# Patient Record
Sex: FEMALE | Race: WHITE | NOT HISPANIC OR LATINO | Employment: OTHER | URBAN - NONMETROPOLITAN AREA
[De-identification: names, ages, dates, MRNs, and addresses within clinical notes are randomized per-mention and may not be internally consistent; named-entity substitution may affect disease eponyms.]

---

## 2017-01-13 ENCOUNTER — OFFICE VISIT (OUTPATIENT)
Dept: CARDIOLOGY | Facility: CLINIC | Age: 77
End: 2017-01-13

## 2017-01-13 VITALS
WEIGHT: 221.6 LBS | DIASTOLIC BLOOD PRESSURE: 76 MMHG | SYSTOLIC BLOOD PRESSURE: 147 MMHG | HEIGHT: 67 IN | BODY MASS INDEX: 34.78 KG/M2 | OXYGEN SATURATION: 98 % | HEART RATE: 75 BPM

## 2017-01-13 DIAGNOSIS — E78.5 HYPERLIPIDEMIA, UNSPECIFIED HYPERLIPIDEMIA TYPE: ICD-10-CM

## 2017-01-13 DIAGNOSIS — I10 ESSENTIAL HYPERTENSION: ICD-10-CM

## 2017-01-13 DIAGNOSIS — I25.10 CORONARY ARTERY DISEASE INVOLVING NATIVE CORONARY ARTERY OF NATIVE HEART WITHOUT ANGINA PECTORIS: Primary | ICD-10-CM

## 2017-01-13 PROCEDURE — 99213 OFFICE O/P EST LOW 20 MIN: CPT | Performed by: NURSE PRACTITIONER

## 2017-01-13 NOTE — PROGRESS NOTES
Subjective   Marissa Carey is a 76 y.o. female     Chief Complaint   Patient presents with   • Follow-up     presents as a follow up       HPI    Problem List:    1) Coronary Artery Disease   a. Cath in 2005 by Dr. Galloway revealing multivessel coronary artery disease s/p CABG by Dr. Soto with SHEN   to first diagonal, SVG to OM, SVG to PDA   b. Cath in 2012 revealing triple vessel disease with LIMA to LAD occluded with collaterals from RCA to LAD, with other grafts patents and medical management recommended    c. Stress Test at [facility]5-28-15 - no ischemia, low risk    d. Cath 7/23/15 - Stent RCA   e. Stress Test 10/24/16 - dense relatively fixed apical defect without an assoc wall motion abnormality most c/w attenuation; preserved post LVEF; indeterminate study like portending low risk   2) Hypertension  2.1) Echo 10/4/16 - mild LVH; EF 60-65%; DD II; early MAC, mild MR, TR and NY; PA 20-25   3) Dyslipidemia  4) Obesity BMI 37  5) Cerebrovascular disease   a. Hx of TIA with workup in 2008 which was negative  6) Peripheral Vascular disease   a. AAA measuring 2.4 cm being monitored by primary  b. SHABBIR - < 20% MARIAN; < 50% LICA   C. SHABBIR 10/4/16 - MARIAN and LICA and bifurcation; antegrade flow   c. BLE arterial duplex at [facility]. 5-28-15 mild atherosclerotic change in BLE, no sign. plaque or stenoses  7) Osteoarthritis  8) GERD  9) Raynaud's disease.  10) Anxiety/Depression    Patient is a 76-year-old female who presents today for a follow-up on test results with her  at her side.  She says she only has left anterior chest pain when she lays on her left side.  She will roll to her back and it will resolve. She says she ends up on her left side during the night and is fine.  She denies any palpitations or fluttering.  She denies any dizziness, presyncope, syncope, orthopnea or PND.  She will get edema in her ankles at times.  She will get short of breath if she does more than normal and  she has not been able to exercise lately as they have been traveling.  PCP monitors her cholesterol.  She had her eye lids lifted.  Spouse is getting ready to go to [place].     We went over labs and stress test.     Current Outpatient Prescriptions   Medication Sig Dispense Refill   • aspirin 81 MG tablet Take 1 tablet by mouth daily.     • clopidogrel (PLAVIX) 75 MG tablet Take 1 tablet by mouth daily.     • Cyanocobalamin (VITAMIN B-12 PO) Take 1 tablet by mouth daily.     • Docusate Calcium (STOOL SOFTENER PO) Take 1 tablet by mouth daily.     • furosemide (LASIX) 20 MG tablet Take  by mouth Daily As Needed.     • losartan (COZAAR) 25 MG tablet Take 25 mg by mouth 2 (Two) Times a Day. 1/2 tab bid      • nitroglycerin (NITROSTAT) 0.4 MG SL tablet Place  under the tongue. Place 1 tablet under the tongue every 5 minutes for up to doses as needed for chest pain . Call 911 if pain persists     • O2 (OXYGEN) Uses sparadically     • Omega-3 Fatty Acids (FISH OIL) 1200 MG capsule capsule Take  by mouth. With 360 mg omega 3 bid     • Probiotic Product (PROBIOTIC DAILY PO) Take  by mouth Daily.     • ranolazine (RANEXA) 500 MG 12 hr tablet Take 1 tablet by mouth every 12 (twelve) hours.     • rosuvastatin (CRESTOR) 20 MG tablet Take 1 tablet by mouth daily.     • acetaminophen (ARTHRITIS PAIN RELIEF) 650 MG 8 hr tablet Take  by mouth.     • AMLODIPINE BESYLATE PO Take 5 mg by mouth.     • buPROPion SR (WELLBUTRIN SR) 150 MG 12 hr tablet 2 (Two) Times a Day.     • carvedilol (COREG) 12.5 MG tablet Take  by mouth. One 12.5 and one 6.25 twice daily     • isosorbide mononitrate (IMDUR) 30 MG 24 hr tablet Take 15 mg by mouth 2 (Two) Times a Day. 1/2 tab bid     • lansoprazole (PREVACID SOLUTAB) 30 MG disintegrating tablet Take 30 mg by mouth Daily As Needed.     • Melatonin 3 MG capsule Take  by mouth Every Night.     • OXYGEN-HELIUM IN Oxygen; Patient Sig: Oxygen QHS; 0; 13-Aug-2015; Active       No current  facility-administered medications for this visit.        ALLERGIES    Review of patient's allergies indicates no known allergies.    Past Medical History   Diagnosis Date   • Aortic aneurysm    • Bilateral foot pain    • CAD, multiple vessel    • Carotid bruit    • Chest pain    • Claudication    • Deviated septum    • Diminished pulses in lower extremity    • Edema    • Former smoker    • GERD (gastroesophageal reflux disease)    • Hyperlipidemia    • Hypertension    • Neuropathy    • Palpitations    • Pulmonary hypertension    • TIA (transient ischemic attack)    • Tinnitus    • Vision impairment        Social History     Social History   • Marital status:      Spouse name: N/A   • Number of children: N/A   • Years of education: N/A     Occupational History   • Not on file.     Social History Main Topics   • Smoking status: Former Smoker   • Smokeless tobacco: Not on file   • Alcohol use No   • Drug use: No   • Sexual activity: Not on file     Other Topics Concern   • Not on file     Social History Narrative       Family History   Problem Relation Age of Onset   • Other Mother      acute myocardial infarction   • Aneurysm Mother    • Goiter Mother    • Aneurysm Father    • Sudden death Father    • Heart failure Sister    • Other Other      leukemia   • Cancer Other      thyroid       Review of Systems   Constitutional: Positive for fatigue. Negative for diaphoresis.   HENT: Positive for hearing loss and rhinorrhea. Negative for sneezing.    Eyes: Positive for visual disturbance (wears glasses. ).        Eye lid lift bilaterally    Respiratory: Positive for shortness of breath (with increased activity ). Negative for chest tightness.    Cardiovascular: Positive for chest pain (when lay down at night; when she lays on left side little pain; don't last long ) and leg swelling (not that often, will take water pill it helps. ). Negative for palpitations.   Gastrointestinal: Positive for constipation, nausea  "(CoQ10 and something made her sick ) and vomiting (CoQ10 and something made her sick ).   Endocrine: Negative.    Genitourinary: Negative for difficulty urinating.   Musculoskeletal: Positive for arthralgias, back pain and myalgias. Negative for neck pain.   Allergic/Immunologic: Negative.    Neurological: Negative for dizziness, syncope and light-headedness.   Hematological: Bruises/bleeds easily (bruises).   Psychiatric/Behavioral: Negative.        Objective   Visit Vitals   • /76 (BP Location: Left arm, Patient Position: Sitting)   • Pulse 75   • Ht 67\" (170.2 cm)   • Wt 221 lb 9.6 oz (101 kg)   • SpO2 98%   • BMI 34.71 kg/m2     Lab Results (most recent)     None        Physical Exam   Constitutional: She is oriented to person, place, and time. Vital signs are normal. She appears well-developed and well-nourished. She is active and cooperative.   HENT:   Head: Normocephalic.   Eyes: Lids are normal.   Wears glasses   Neck: Normal carotid pulses, no hepatojugular reflux and no JVD present. Carotid bruit is not present.   Cardiovascular: Normal rate, regular rhythm and normal heart sounds.    Pulses:       Radial pulses are 2+ on the right side, and 2+ on the left side.        Dorsalis pedis pulses are 2+ on the left side.        Posterior tibial pulses are 2+ on the left side.   Decreased pedal pulses RLE; spider veins BLE   Pulmonary/Chest: Effort normal and breath sounds normal.   Abdominal: Normal appearance.   Neurological: She is alert and oriented to person, place, and time.   Skin: Skin is warm, dry and intact.   Psychiatric: She has a normal mood and affect. Her speech is normal and behavior is normal. Judgment and thought content normal. Cognition and memory are normal.       Procedure   Procedures         Assessment/Plan      Diagnosis Plan   1. Coronary artery disease involving native coronary artery of native heart without angina pectoris     2. Essential hypertension     3. Hyperlipidemia, " unspecified hyperlipidemia type         Return in about 3 months (around 4/13/2017).       Patient is doing well from a cardiac standpoint.  She will continue her medication regimen.  She will follow-up in 3 mos or sooner if any changes.

## 2017-01-13 NOTE — PATIENT INSTRUCTIONS
Coronary Artery Disease, Female  Coronary artery disease (CAD) is a process in which the blood vessels of the heart (coronary arteries) become narrow or blocked. The narrowing or blockage can lead to decreased blood flow to the heart muscle (angina). Symptoms known as angina can develop if the blood flow is reduced to the heart for a short period of time. Prolonged reduced blood flow can cause a heart attack (myocardial infarction, MI).  CAD is a leading cause of death for women. More women die from CAD than from cancer, lung disease, and accidents combined. It is important for women to understand the risks, symptoms, and treatment options for CAD.  CAUSES  Atherosclerosis is the cause of CAD. Atherosclerosis is the buildup of fat and cholesterol (plaque) on the inside of the arteries. Over time, the plaque may narrow or block the artery, and this will lessen blood flow to the heart. Plaque can also become weak and break off within a coronary artery to form a clot and cause a sudden blockage.  RISK FACTORS  Many risk factors increase your chances of getting CAD, including:  · High cholesterol levels.  · High blood pressure (hypertension).  · Tobacco use.  · Diabetes.  · Age. Women over age 55 are at a greater risk of CAD.  · Menopause.    All postmenopausal women are at greater risk of CAD.    Women who have experienced menopause between the ages of 40-45 (early menopause) are at a higher risk of CAD.    Women who have experienced menopause before age 40 (premature menopause) are at an extremely high risk of CAD.  · Family history of CAD.  · Obesity.  · Lack of exercise.  · A diet high in saturated fats.  SYMPTOMS   Many people do not experience any symptoms during the early stages of CAD. As the condition progresses, symptoms may include:  · Chest pain.    The pain can be described as crushing or squeezing, or a tightness, pressure, fullness, or heaviness in the chest.    The pain can last more than a few minutes  or can stop and recur.  · Pain in the arms, neck, jaw, or back.  · Unexplained heartburn or indigestion.  · Shortness of breath.  · Nausea.  · Sudden cold sweats.  · Sudden light-headedness.  Many women have chest discomfort and the other symptoms. However, women often have different (atypical) symptoms, such as:  · Fatigue.  · Unexplained feelings of nervousness or anxiety.  · Unexplained weakness.  · Dizziness or fainting.  Sometimes, women may not have any symptoms of CAD.  DIAGNOSIS   Tests to diagnose CAD may include:  · ECG (electrocardiogram).  · Exercise stress test. This looks for signs of blockage when the heart is being exercised.  · Pharmacologic stress test. This test looks for signs of blockage when the heart is being stressed with a medicine.  · Blood tests.  · Coronary angiogram. This is a procedure to look at the coronary arteries to see if there is any blockage.  TREATMENT  The treatment of CAD may include the following:  · Healthy behavioral changes to reduce or control risk factors.  · Medicine.  · Coronary stenting. A stent helps to keep an artery open.  · Coronary angioplasty. This procedure widens a narrowed or blocked artery.  · Coronary artery bypass surgery. This will allow your blood to pass the blockage (bypass) to reach your heart.  HOME CARE INSTRUCTIONS  · Take medicines only as directed by your health care provider.  · Do not take the following medicines unless your health care provider approves:    Nonsteroidal anti-inflammatory drugs (NSAIDs), such as ibuprofen, naproxen, or celecoxib.    Vitamin supplements that contain vitamin A, vitamin E, or both.    Hormone replacement therapy that contains estrogen with or without progestin.  · Manage other health conditions such as hypertension and diabetes as directed by your health care provider.  · Follow a heart-healthy diet. A dietitian can help to educate you about healthy food options and changes.  · Use healthy cooking methods such as  roasting, grilling, broiling, baking, poaching, steaming, or stir-frying. Talk to a dietitian to learn more about healthy cooking methods.  · Follow an exercise program approved by your health care provider.  · Maintain a healthy weight. Lose weight as approved by your health care provider.  · Plan rest periods when fatigued.  · Learn to manage stress.  · Do not use any tobacco products, including cigarettes, chewing tobacco, or electronic cigarettes. If you need help quitting, ask your health care provider.  · If you drink alcohol, and your health care provider approves, limit your alcohol intake to no more than 1 drink per day. One drink equals 12 ounces of beer, 5 ounces of wine, or 1½ ounces of hard liquor.  · Stop illegal drug use.  · Your health care provider may ask you to monitor your blood pressure. A blood pressure reading consists of a higher number over a lower number, such as 110 over 72, which is written as 110/72. Ideally, your blood pressure should be:    Below 140/90 if you have no other medical conditions.    Below 130/80 if you have diabetes or kidney disease.  · Keep all follow-up visits as directed by your health care provider. This is important.  SEEK IMMEDIATE MEDICAL CARE IF:  · You have pain in your chest, neck, arm, jaw, stomach, or back that lasts more than a few minutes, is recurring, or is unrelieved by taking medicine under your tongue (sublingual nitroglycerin).  · You have profuse sweating without cause.  · You have unexplained:    Heartburn or indigestion.    Shortness of breath or difficulty breathing.    Nausea or vomiting.    Fatigue.    Feelings of nervousness or anxiety.    Weakness.    Diarrhea.  · You have sudden light-headedness or dizziness.  · You faint.  These symptoms may represent a serious problem that is an emergency. Do not wait to see if the symptoms will go away. Get medical help right away. Call your local emergency services (911 in the [place] Do not drive yourself  to the hospital.     This information is not intended to replace advice given to you by your health care provider. Make sure you discuss any questions you have with your health care provider.     Document Released: 03/11/2013 Document Revised: 01/08/2016 Document Reviewed: 04/21/2015  Elsevier Interactive Patient Education ©2016 [employer] Inc[employer]

## 2017-01-13 NOTE — MR AVS SNAPSHOT
Marissa Carey   1/13/2017 8:30 AM   Office Visit    Dept Phone:  [phone]   Encounter #:  [id number]    Provider:  DENIS Kingsley   Department:  [facility]                Your Full Care Plan              Your Updated Medication List          This list is accurate as of: 1/13/17  8:52 AM.  Always use your most recent med list.                AMLODIPINE BESYLATE PO       ARTHRITIS PAIN RELIEF 650 MG 8 hr tablet   Generic drug:  acetaminophen       aspirin 81 MG tablet       buPROPion  MG 12 hr tablet   Commonly known as:  WELLBUTRIN SR       carvedilol 12.5 MG tablet   Commonly known as:  COREG       clopidogrel 75 MG tablet   Commonly known as:  PLAVIX       fish oil 1200 MG capsule capsule       furosemide 20 MG tablet   Commonly known as:  LASIX       isosorbide mononitrate 30 MG 24 hr tablet   Commonly known as:  IMDUR       lansoprazole 30 MG disintegrating tablet   Commonly known as:  PREVACID SOLUTAB       losartan 25 MG tablet   Commonly known as:  COZAAR       Melatonin 3 MG capsule       nitroglycerin 0.4 MG SL tablet   Commonly known as:  NITROSTAT       O2   Commonly known as:  OXYGEN       OXYGEN-HELIUM IN       PROBIOTIC DAILY PO       ranolazine 500 MG 12 hr tablet   Commonly known as:  RANEXA       rosuvastatin 20 MG tablet   Commonly known as:  CRESTOR       STOOL SOFTENER PO       VITAMIN B-12 PO               You Were Diagnosed With        Codes Comments    Coronary artery disease involving native coronary artery of native heart without angina pectoris    -  Primary ICD-10-CM: I25.10  ICD-9-CM: 414.01     Essential hypertension     ICD-10-CM: I10  ICD-9-CM: 401.9     Hyperlipidemia, unspecified hyperlipidemia type     ICD-10-CM: E78.5  ICD-9-CM: 272.4       Instructions    Coronary Artery Disease, Female  Coronary artery disease (CAD) is a process in which the blood vessels of the heart (coronary arteries) become narrow or  blocked. The narrowing or blockage can lead to decreased blood flow to the heart muscle (angina). Symptoms known as angina can develop if the blood flow is reduced to the heart for a short period of time. Prolonged reduced blood flow can cause a heart attack (myocardial infarction, MI).  CAD is a leading cause of death for women. More women die from CAD than from cancer, lung disease, and accidents combined. It is important for women to understand the risks, symptoms, and treatment options for CAD.  CAUSES  Atherosclerosis is the cause of CAD. Atherosclerosis is the buildup of fat and cholesterol (plaque) on the inside of the arteries. Over time, the plaque may narrow or block the artery, and this will lessen blood flow to the heart. Plaque can also become weak and break off within a coronary artery to form a clot and cause a sudden blockage.  RISK FACTORS  Many risk factors increase your chances of getting CAD, including:  · High cholesterol levels.  · High blood pressure (hypertension).  · Tobacco use.  · Diabetes.  · Age. Women over age 55 are at a greater risk of CAD.  · Menopause.    All postmenopausal women are at greater risk of CAD.    Women who have experienced menopause between the ages of 40-45 (early menopause) are at a higher risk of CAD.    Women who have experienced menopause before age 40 (premature menopause) are at an extremely high risk of CAD.  · Family history of CAD.  · Obesity.  · Lack of exercise.  · A diet high in saturated fats.  SYMPTOMS   Many people do not experience any symptoms during the early stages of CAD. As the condition progresses, symptoms may include:  · Chest pain.    The pain can be described as crushing or squeezing, or a tightness, pressure, fullness, or heaviness in the chest.    The pain can last more than a few minutes or can stop and recur.  · Pain in the arms, neck, jaw, or back.  · Unexplained heartburn or indigestion.  · Shortness of breath.  · Nausea.  · Sudden cold  sweats.  · Sudden light-headedness.  Many women have chest discomfort and the other symptoms. However, women often have different (atypical) symptoms, such as:  · Fatigue.  · Unexplained feelings of nervousness or anxiety.  · Unexplained weakness.  · Dizziness or fainting.  Sometimes, women may not have any symptoms of CAD.  DIAGNOSIS   Tests to diagnose CAD may include:  · ECG (electrocardiogram).  · Exercise stress test. This looks for signs of blockage when the heart is being exercised.  · Pharmacologic stress test. This test looks for signs of blockage when the heart is being stressed with a medicine.  · Blood tests.  · Coronary angiogram. This is a procedure to look at the coronary arteries to see if there is any blockage.  TREATMENT  The treatment of CAD may include the following:  · Healthy behavioral changes to reduce or control risk factors.  · Medicine.  · Coronary stenting. A stent helps to keep an artery open.  · Coronary angioplasty. This procedure widens a narrowed or blocked artery.  · Coronary artery bypass surgery. This will allow your blood to pass the blockage (bypass) to reach your heart.  HOME CARE INSTRUCTIONS  · Take medicines only as directed by your health care provider.  · Do not take the following medicines unless your health care provider approves:    Nonsteroidal anti-inflammatory drugs (NSAIDs), such as ibuprofen, naproxen, or celecoxib.    Vitamin supplements that contain vitamin A, vitamin E, or both.    Hormone replacement therapy that contains estrogen with or without progestin.  · Manage other health conditions such as hypertension and diabetes as directed by your health care provider.  · Follow a heart-healthy diet. A dietitian can help to educate you about healthy food options and changes.  · Use healthy cooking methods such as roasting, grilling, broiling, baking, poaching, steaming, or stir-frying. Talk to a dietitian to learn more about healthy cooking methods.  · Follow an  exercise program approved by your health care provider.  · Maintain a healthy weight. Lose weight as approved by your health care provider.  · Plan rest periods when fatigued.  · Learn to manage stress.  · Do not use any tobacco products, including cigarettes, chewing tobacco, or electronic cigarettes. If you need help quitting, ask your health care provider.  · If you drink alcohol, and your health care provider approves, limit your alcohol intake to no more than 1 drink per day. One drink equals 12 ounces of beer, 5 ounces of wine, or 1½ ounces of hard liquor.  · Stop illegal drug use.  · Your health care provider may ask you to monitor your blood pressure. A blood pressure reading consists of a higher number over a lower number, such as 110 over 72, which is written as 110/72. Ideally, your blood pressure should be:    Below 140/90 if you have no other medical conditions.    Below 130/80 if you have diabetes or kidney disease.  · Keep all follow-up visits as directed by your health care provider. This is important.  SEEK IMMEDIATE MEDICAL CARE IF:  · You have pain in your chest, neck, arm, jaw, stomach, or back that lasts more than a few minutes, is recurring, or is unrelieved by taking medicine under your tongue (sublingual nitroglycerin).  · You have profuse sweating without cause.  · You have unexplained:    Heartburn or indigestion.    Shortness of breath or difficulty breathing.    Nausea or vomiting.    Fatigue.    Feelings of nervousness or anxiety.    Weakness.    Diarrhea.  · You have sudden light-headedness or dizziness.  · You faint.  These symptoms may represent a serious problem that is an emergency. Do not wait to see if the symptoms will go away. Get medical help right away. Call your local emergency services (911 in the [place] Do not drive yourself to the hospital.     This information is not intended to replace advice given to you by your health care provider. Make sure you discuss any questions  "you have with your health care provider.     Document Released: 03/11/2013 Document Revised: 01/08/2016 Document Reviewed: 04/21/2015  [employer] Interactive Patient Education ©2016 [employer]       Patient Instructions History      Upcoming Appointments     Visit Type Date Time Department    FOLLOW UP 1/13/2017  8:30 AM [facility] Signup     Our records indicate that you have declined [employer] signup. If you would like to sign up for [employer], please email [email or web address] or call [phone] to obtain an activation code.             Other Info from Your Visit           Allergies     No Known Allergies      Reason for Visit     Follow-up presents as a follow up      Vital Signs     Blood Pressure Pulse Height Weight Oxygen Saturation Body Mass Index    147/76 (BP Location: Left arm, Patient Position: Sitting) 75 67\" (170.2 cm) 221 lb 9.6 oz (101 kg) 98% 34.71 kg/m2    Smoking Status                   Former Smoker           Problems and Diagnoses Noted     High cholesterol or triglycerides    High blood pressure    Coronary artery disease involving native coronary artery of native heart without angina pectoris    -  Primary        "

## 2017-04-13 ENCOUNTER — OFFICE VISIT (OUTPATIENT)
Dept: CARDIOLOGY | Facility: CLINIC | Age: 77
End: 2017-04-13

## 2017-04-13 VITALS
BODY MASS INDEX: 33.56 KG/M2 | WEIGHT: 213.8 LBS | DIASTOLIC BLOOD PRESSURE: 72 MMHG | HEART RATE: 67 BPM | SYSTOLIC BLOOD PRESSURE: 123 MMHG | HEIGHT: 67 IN | OXYGEN SATURATION: 97 %

## 2017-04-13 DIAGNOSIS — I10 ESSENTIAL HYPERTENSION: ICD-10-CM

## 2017-04-13 DIAGNOSIS — E78.5 HYPERLIPIDEMIA, UNSPECIFIED HYPERLIPIDEMIA TYPE: ICD-10-CM

## 2017-04-13 DIAGNOSIS — I25.10 CAD, MULTIPLE VESSEL: ICD-10-CM

## 2017-04-13 DIAGNOSIS — R06.02 SOB (SHORTNESS OF BREATH): ICD-10-CM

## 2017-04-13 DIAGNOSIS — K21.9 GASTROESOPHAGEAL REFLUX DISEASE, ESOPHAGITIS PRESENCE NOT SPECIFIED: ICD-10-CM

## 2017-04-13 DIAGNOSIS — R07.9 CHEST PAIN, UNSPECIFIED TYPE: Primary | ICD-10-CM

## 2017-04-13 PROCEDURE — 93000 ELECTROCARDIOGRAM COMPLETE: CPT | Performed by: NURSE PRACTITIONER

## 2017-04-13 PROCEDURE — 99213 OFFICE O/P EST LOW 20 MIN: CPT | Performed by: NURSE PRACTITIONER

## 2017-04-13 NOTE — PROGRESS NOTES
Subjective   Marissa Carey is a 76 y.o. female     Chief Complaint   Patient presents with   • Follow-up     patient is here today for 3 month follow up    • Chest Pain     mainly when she lays down in bed        HPI    Problem List:    1) Coronary Artery Disease   a. Cath in 2005 by Dr. Galloway revealing multivessel coronary artery disease s/p CABG by Dr. Soto with SHEN   to first diagonal, SVG to OM, SVG to PDA   b. Cath in 2012 revealing triple vessel disease with LIMA to LAD occluded with collaterals from RCA to LAD, with other grafts patents and medical management recommended    c. Stress Test at [facility]5-28-15 - no ischemia, low risk    d. Cath 7/23/15 - Stent RCA   e. Stress Test 10/24/16 - dense relatively fixed apical defect without an assoc wall motion abnormality most c/w attenuation; preserved post LVEF; indeterminate study like portending low risk   2) Hypertension  2.1) Echo 10/4/16 - mild LVH; EF 60-65%; DD II; early MAC, mild MR, TR and GA; PA 20-25   3) Dyslipidemia  4) Obesity BMI 37  5) Cerebrovascular disease   a. Hx of TIA with workup in 2008 which was negative  6) Peripheral Vascular disease   a. AAA measuring 2.4 cm being monitored by primary  b. SHABBIR - < 20% MARIAN; < 50% LICA   C. SHABBIR 10/4/16 - MARIAN and LICA and bifurcation; antegrade flow   c. BLE arterial duplex at [facility] hosp. 5-28-15 mild atherosclerotic change in BLE, no sign. plaque or stenoses  7) Osteoarthritis  8) GERD  9) Raynaud's disease.  10) Anxiety/Depression  11) Event Monitor 7/7-7/20/15 - SB - NSR with PVCs    Patient is a 76-year-old female who presents today for a follow-up with her  at her side.  She says she has some pain midsternum when she lays down at night.  She says if she sits up it is fine.  She has no other symptoms with it.  When I palpate her midsternum it is very tender.  She usually sleeps on her side, appears to be muscle related.  She denies any palpitations, fluttering,  dizziness, presyncope, syncope, orthopnea, PND or edema.  She only gets short of breath if she over does it.  She has been otherwise doing pretty well.      Current Outpatient Prescriptions   Medication Sig Dispense Refill   • acetaminophen (ARTHRITIS PAIN RELIEF) 650 MG 8 hr tablet Take  by mouth.     • aspirin 81 MG tablet Take 1 tablet by mouth daily.     • carvedilol (COREG) 12.5 MG tablet Take 12.5 mg by mouth. Take 1 & 1/2 q AM; and 1 & 1/2 q PM     • clopidogrel (PLAVIX) 75 MG tablet Take 1 tablet by mouth daily.     • Cyanocobalamin (VITAMIN B-12 PO) Take 1 tablet by mouth daily.     • Docusate Calcium (STOOL SOFTENER PO) Take 1 tablet by mouth daily.     • furosemide (LASIX) 20 MG tablet Take  by mouth Daily As Needed.     • isosorbide mononitrate (IMDUR) 30 MG 24 hr tablet Take 15 mg by mouth 2 (Two) Times a Day. 1/2 tab bid     • lansoprazole (PREVACID SOLUTAB) 30 MG disintegrating tablet Take 30 mg by mouth Daily As Needed.     • losartan (COZAAR) 25 MG tablet Take 25 mg by mouth 2 (Two) Times a Day. 1/2 tab bid      • nitroglycerin (NITROSTAT) 0.4 MG SL tablet Place  under the tongue. Place 1 tablet under the tongue every 5 minutes for up to doses as needed for chest pain . Call 911 if pain persists     • O2 (OXYGEN) Uses sparadically     • Omega-3 Fatty Acids (FISH OIL) 1200 MG capsule capsule Take  by mouth. With 360 mg omega 3 bid     • OXYGEN-HELIUM IN Oxygen; Patient Sig: Oxygen QHS; 0; 13-Aug-2015; Active     • Probiotic Product (PROBIOTIC DAILY PO) Take  by mouth Daily.     • ranolazine (RANEXA) 500 MG 12 hr tablet Take 1 tablet by mouth every 12 (twelve) hours.     • rosuvastatin (CRESTOR) 20 MG tablet Take 1 tablet by mouth daily.       No current facility-administered medications for this visit.        ALLERGIES    Review of patient's allergies indicates no known allergies.    Past Medical History:   Diagnosis Date   • Aortic aneurysm    • Bilateral foot pain    • CAD, multiple vessel    •  Carotid bruit    • Chest pain    • Claudication    • Deviated septum    • Diminished pulses in lower extremity    • Edema    • Former smoker    • GERD (gastroesophageal reflux disease)    • Hyperlipidemia    • Hypertension    • Neuropathy    • Palpitations    • Pulmonary hypertension    • TIA (transient ischemic attack)    • Tinnitus    • Vision impairment        Social History     Social History   • Marital status:      Spouse name: N/A   • Number of children: N/A   • Years of education: N/A     Occupational History   • Not on file.     Social History Main Topics   • Smoking status: Former Smoker   • Smokeless tobacco: Never Used   • Alcohol use No   • Drug use: No   • Sexual activity: Defer     Other Topics Concern   • Not on file     Social History Narrative       Family History   Problem Relation Age of Onset   • Other Mother      acute myocardial infarction   • Aneurysm Mother    • Goiter Mother    • Aneurysm Father    • Sudden death Father    • Heart failure Sister    • Other Other      leukemia   • Cancer Other      thyroid       Review of Systems   Constitutional: Positive for fatigue. Negative for diaphoresis.   HENT: Positive for nosebleeds (occasional ), rhinorrhea and sneezing.    Eyes: Positive for visual disturbance (patient wears glasses).   Respiratory: Positive for cough and shortness of breath ( on exertion; if over do it ).    Cardiovascular: Positive for chest pain (when she lays down in bed of a night; tender to touch can't lay certain ways, if sits up does not hurt). Negative for palpitations and leg swelling.   Gastrointestinal: Positive for constipation.   Endocrine: Negative.    Genitourinary: Negative for difficulty urinating.   Musculoskeletal: Positive for arthralgias, back pain, gait problem (uses a cane ) and neck pain.   Skin: Negative.    Allergic/Immunologic: Positive for environmental allergies (seasonal ). Negative for food allergies.   Neurological: Positive for headaches  "(occasional ). Negative for dizziness, syncope and light-headedness.   Hematological: Bruises/bleeds easily.   Psychiatric/Behavioral: The patient is nervous/anxious.        Objective   /72 (BP Location: Left arm, Patient Position: Sitting)  Pulse 67  Ht 67\" (170.2 cm)  Wt 213 lb 12.8 oz (97 kg)  SpO2 97%  BMI 33.49 kg/m2  Lab Results (most recent)     None        Physical Exam   Constitutional: She is oriented to person, place, and time. Vital signs are normal. She appears well-developed and well-nourished. She is active and cooperative.   HENT:   Head: Normocephalic.   Eyes: Lids are normal.   Wears glasses    Neck: Normal carotid pulses, no hepatojugular reflux and no JVD present. Carotid bruit is not present.   Cardiovascular: Normal rate, regular rhythm and normal heart sounds.    Pulses:       Radial pulses are 2+ on the right side, and 2+ on the left side.        Dorsalis pedis pulses are 2+ on the left side.        Posterior tibial pulses are 2+ on the left side.   Spider veins BLE; Decreased pedal pulse RLE; no edema BLE.    Pulmonary/Chest: Effort normal and breath sounds normal.   Abdominal: Normal appearance.   Neurological: She is alert and oriented to person, place, and time.   Skin: Skin is warm, dry and intact.   Psychiatric: She has a normal mood and affect. Her speech is normal and behavior is normal. Judgment and thought content normal. Cognition and memory are normal.       Procedure     ECG 12 Lead  Date/Time: 4/13/2017 11:01 AM  Performed by: WINTER CARBALLO  Authorized by: WINTER CARBALLO   Comparison: compared with previous ECG from 6/3/2014  Rhythm: sinus rhythm  Rate: normal  BPM: 61  QRS axis: normal  Clinical impression: non-specific ECG and low voltage  Comments: CP  SOB                 Assessment/Plan      Diagnosis Plan   1. Chest pain, unspecified type  ECG 12 Lead   2. SOB (shortness of breath)  ECG 12 Lead   3. Essential hypertension     4. Hyperlipidemia, unspecified " hyperlipidemia type     5. CAD, multiple vessel     6. Gastroesophageal reflux disease, esophagitis presence not specified         Return in about 6 months (around 10/13/2017).       Patient is doing very well from a cardiac standpoint.  She will continue her medication regimen.  She will follow-up in 6 mos or sooner if any changes.

## 2017-10-16 ENCOUNTER — OFFICE VISIT (OUTPATIENT)
Dept: CARDIOLOGY | Facility: CLINIC | Age: 77
End: 2017-10-16

## 2017-10-16 VITALS
DIASTOLIC BLOOD PRESSURE: 53 MMHG | OXYGEN SATURATION: 93 % | BODY MASS INDEX: 31.55 KG/M2 | HEART RATE: 67 BPM | SYSTOLIC BLOOD PRESSURE: 94 MMHG | WEIGHT: 201 LBS | HEIGHT: 67 IN

## 2017-10-16 DIAGNOSIS — I27.20 PULMONARY HYPERTENSION (HCC): ICD-10-CM

## 2017-10-16 DIAGNOSIS — I25.10 CAD, MULTIPLE VESSEL: ICD-10-CM

## 2017-10-16 DIAGNOSIS — I71.40 ABDOMINAL AORTIC ANEURYSM (AAA) WITHOUT RUPTURE (HCC): ICD-10-CM

## 2017-10-16 DIAGNOSIS — E78.5 HYPERLIPIDEMIA, UNSPECIFIED HYPERLIPIDEMIA TYPE: ICD-10-CM

## 2017-10-16 DIAGNOSIS — I10 ESSENTIAL HYPERTENSION: Primary | ICD-10-CM

## 2017-10-16 PROCEDURE — 99214 OFFICE O/P EST MOD 30 MIN: CPT | Performed by: NURSE PRACTITIONER

## 2017-10-16 RX ORDER — LOSARTAN POTASSIUM 25 MG/1
25 TABLET ORAL DAILY
Qty: 30 TABLET | Refills: 0 | Status: SHIPPED | OUTPATIENT
Start: 2017-10-16 | End: 2020-06-03

## 2017-10-16 RX ORDER — HYDROCODONE BITARTRATE AND ACETAMINOPHEN 7.5; 325 MG/1; MG/1
1 TABLET ORAL EVERY 6 HOURS PRN
COMMUNITY
Start: 2017-09-29 | End: 2021-06-07

## 2017-10-16 RX ORDER — ISOSORBIDE MONONITRATE 30 MG/1
15 TABLET, EXTENDED RELEASE ORAL DAILY
Qty: 30 TABLET | Refills: 0 | Status: SHIPPED | OUTPATIENT
Start: 2017-10-16 | End: 2018-10-17

## 2017-10-16 RX ORDER — MELATONIN
2000 DAILY
COMMUNITY
End: 2018-04-17 | Stop reason: ALTCHOICE

## 2017-10-16 RX ORDER — LINACLOTIDE 145 UG/1
145 CAPSULE, GELATIN COATED ORAL AS NEEDED
COMMUNITY
Start: 2017-08-11

## 2017-10-16 NOTE — PROGRESS NOTES
Subjective   Marissa Carey is a 77 y.o. female     Chief Complaint   Patient presents with   • Coronary Artery Disease     Here for 6 mo. f/u   • Hypertension   • Hyperlipidemia   • Heartburn   • Aortic Aneurysm       HPI    Problem List:    1) Coronary Artery Disease   a. Cath in 2005 by Dr. Galloway revealing multivessel coronary artery disease s/p CABG by Dr. Soto with SHEN   to first diagonal, SVG to OM, SVG to PDA   b. Cath in 2012 revealing triple vessel disease with LIMA to LAD occluded with collaterals from RCA to LAD, with other grafts patents and medical management recommended    c. Stress Test at [facility]5-28-15 - no ischemia, low risk    d. Cath 7/23/15 - Stent RCA   e. Stress Test 10/24/16 - dense relatively fixed apical defect without an assoc wall motion abnormality most c/w attenuation; preserved post LVEF; indeterminate study like portending low risk   2) Hypertension  2.1) Echo 10/4/16 - mild LVH; EF 60-65%; DD II; early MAC, mild MR, TR and TN; PA 20-25   3) Dyslipidemia  4) Obesity BMI 37  5) Cerebrovascular disease   a. Hx of TIA with workup in 2008 which was negative  6) Peripheral Vascular disease    a. SHABBIR - < 20% MARIAN; < 50% LICA   b. SHABBIR 10/4/16 - MARIAN and LICA and bifurcation; antegrade flow   c. BLE arterial duplex at [facility]. 5-28-15 mild atherosclerotic change in BLE, no sign. plaque or stenoses  7) Osteoarthritis  8) GERD  9) Raynaud's disease.  10) Anxiety/Depression  11) Event Monitor 7/7-7/20/15 - SB - NSR with PVCs      12)  AAA w/repair 1997        A. Abdominal US 7/24/14 - 2.4 CM    Patient is a 77-year-old female who presents today for follow-up with her  at her side.  She denies any chest pain, pressure, dizziness, presyncope, syncope, orthopnea, PND or edema.  She says she will have palpitations at times when she lays down at night.  She says she was a little lightheaded but her blood pressure was low.  Patient has lost 12 pounds that she was  here last.  We will need to make adjustments to her medications.  She denies any shortness of breath with activity.  She says her PCP was originally supposed to monitor her AAA, however she's not had any examination on it since 2014.  We will go ahead and take this over.         Current Outpatient Prescriptions   Medication Sig Dispense Refill   • aspirin 81 MG tablet Take 1 tablet by mouth daily.     • carvedilol (COREG) 12.5 MG tablet Take 12.5 mg by mouth. Take 1 & 1/2 q AM; and 1 & 1/2 q PM     • cholecalciferol (VITAMIN D3) 1000 units tablet Take 2,000 Units by mouth Daily.     • clopidogrel (PLAVIX) 75 MG tablet Take 1 tablet by mouth daily.     • Cyanocobalamin (VITAMIN B-12 PO) Take 1 tablet by mouth daily.     • Docusate Calcium (STOOL SOFTENER PO) Take 1 tablet by mouth daily.     • HYDROcodone-acetaminophen (NORCO) 7.5-325 MG per tablet prn     • isosorbide mononitrate (IMDUR) 30 MG 24 hr tablet Take 0.5 tablets by mouth Daily. 1/2 tab bid 30 tablet 0   • lansoprazole (PREVACID SOLUTAB) 30 MG disintegrating tablet Take 30 mg by mouth Daily As Needed.     • LINZESS 290 MCG capsule capsule Daily.     • losartan (COZAAR) 25 MG tablet Take 1 tablet by mouth Daily. 30 tablet 0   • Omega-3 Fatty Acids (FISH OIL) 1200 MG capsule capsule Take  by mouth. With 360 mg omega 3 bid     • OXYGEN-HELIUM IN Oxygen; Patient Sig: Oxygen QHS; 0; 13-Aug-2015; Active     • ranolazine (RANEXA) 500 MG 12 hr tablet Take 1 tablet by mouth every 12 (twelve) hours.     • rosuvastatin (CRESTOR) 20 MG tablet Take 1 tablet by mouth daily.     • furosemide (LASIX) 20 MG tablet Take  by mouth Daily As Needed.     • nitroglycerin (NITROSTAT) 0.4 MG SL tablet Place  under the tongue. Place 1 tablet under the tongue every 5 minutes for up to doses as needed for chest pain . Call 911 if pain persists       No current facility-administered medications for this visit.        ALLERGIES    Review of patient's allergies indicates no known  allergies.    Past Medical History:   Diagnosis Date   • Aortic aneurysm    • Bilateral foot pain    • CAD, multiple vessel    • Carotid bruit    • Chest pain    • Claudication    • Deviated septum    • Diminished pulses in lower extremity    • Edema    • Former smoker    • GERD (gastroesophageal reflux disease)    • Hyperlipidemia    • Hypertension    • Neuropathy    • Palpitations    • Pulmonary hypertension    • TIA (transient ischemic attack)    • Tinnitus    • Vision impairment        Social History     Social History   • Marital status:      Spouse name: N/A   • Number of children: N/A   • Years of education: N/A     Occupational History   • Not on file.     Social History Main Topics   • Smoking status: Former Smoker   • Smokeless tobacco: Never Used   • Alcohol use No   • Drug use: No   • Sexual activity: Defer     Other Topics Concern   • Not on file     Social History Narrative       Family History   Problem Relation Age of Onset   • Other Mother      acute myocardial infarction   • Aneurysm Mother    • Goiter Mother    • Aneurysm Father    • Sudden death Father    • Heart failure Sister    • Other Other      leukemia   • Cancer Other      thyroid       Review of Systems   Constitutional: Negative for diaphoresis and fatigue.   HENT: Positive for rhinorrhea. Negative for sneezing.    Eyes: Positive for visual disturbance (wears glasses).   Respiratory: Positive for cough (mostly at night, getting stuff up). Negative for shortness of breath.    Cardiovascular: Positive for palpitations (when she lays down at times she will notice it. ). Negative for chest pain and leg swelling.   Gastrointestinal: Positive for constipation. Negative for nausea and vomiting.   Endocrine: Negative.    Genitourinary: Negative for difficulty urinating.   Musculoskeletal: Positive for arthralgias, back pain, gait problem (uses a cane ), myalgias and neck pain.   Skin: Negative.    Allergic/Immunologic: Positive for  "environmental allergies.   Neurological: Positive for light-headedness (with hypotension). Negative for dizziness and syncope.   Hematological: Bruises/bleeds easily.   Psychiatric/Behavioral: Negative.        Objective   BP 94/53 (BP Location: Left arm, Patient Position: Sitting)  Pulse 67  Ht 67\" (170.2 cm)  Wt 201 lb (91.2 kg)  SpO2 93%  BMI 31.48 kg/m2  Lab Results (most recent)     None        Physical Exam   Constitutional: She is oriented to person, place, and time. Vital signs are normal. She appears well-developed and well-nourished. She is active and cooperative.   HENT:   Head: Normocephalic.   Eyes: Lids are normal.   Wears glasses    Neck: Normal carotid pulses, no hepatojugular reflux and no JVD present. Carotid bruit is not present.   Cardiovascular: Normal rate, regular rhythm and normal heart sounds.    Pulses:       Radial pulses are 2+ on the right side, and 2+ on the left side.        Dorsalis pedis pulses are 2+ on the right side, and 2+ on the left side.        Posterior tibial pulses are 2+ on the right side, and 2+ on the left side.   No edema BLE.    Pulmonary/Chest: Effort normal and breath sounds normal.   Abdominal: Normal appearance and bowel sounds are normal.   Musculoskeletal:   Uses a cane    Neurological: She is alert and oriented to person, place, and time.   Skin: Skin is warm, dry and intact.   Psychiatric: She has a normal mood and affect. Her speech is normal and behavior is normal. Judgment and thought content normal. Cognition and memory are normal.         Assessment/Plan      Diagnosis Plan   1. Essential hypertension  losartan (COZAAR) 25 MG tablet   2. Abdominal aortic aneurysm (AAA) without rupture  US Abdomen Complete    US Abdomen Complete   3. Pulmonary hypertension     4. Hyperlipidemia, unspecified hyperlipidemia type     5. CAD, multiple vessel  losartan (COZAAR) 25 MG tablet    isosorbide mononitrate (IMDUR) 30 MG 24 hr tablet       Return in about 6 months " (around 4/16/2018).       hypertension-patient will decrease losartan to 1 tablet day.  AAA-patient will have abdominal ultrasound.  Pulmonary hypertension-no change.  Hyperlipidemia-patient is on her story monitor by PCP.  CAD-patient is on aspirin, beta and statin.  I did decrease her isosorbide to half a tablet once a day.  She will continue her medication regimen otherwise.  She will follow-up in 6 months or sooner if any changes.  They will monitor her blood pressure and heart rate and advise if it goes too low still or if it goes too high.

## 2017-10-16 NOTE — PATIENT INSTRUCTIONS
Abdominal Aortic Aneurysm  An aneurysm is a weakened or damaged part of an artery wall that bulges from the normal force of blood pumping through the body. An abdominal aortic aneurysm is an aneurysm that occurs in the lower part of the aorta, the main artery of the body.   The major concern with an abdominal aortic aneurysm is that it can enlarge and burst (rupture) or blood can flow between the layers of the wall of the aorta through a tear (aortic dissection). Both of these conditions can cause bleeding inside the body and can be life threatening unless diagnosed and treated promptly.  CAUSES   The exact cause of an abdominal aortic aneurysm is unknown. Some contributing factors are:   · A hardening of the arteries caused by the buildup of fat and other substances in the lining of a blood vessel (arteriosclerosis).  · Inflammation of the walls of an artery (arteritis).    · Connective tissue diseases, such as Marfan syndrome.    · Abdominal trauma.    · An infection, such as syphilis or staphylococcus, in the wall of the aorta (infectious aortitis) caused by bacteria.  RISK FACTORS   Risk factors that contribute to an abdominal aortic aneurysm may include:  · Age older than 60 years.    · High blood pressure (hypertension).  · Male gender.  · Ethnicity (white race).  · Obesity.  · Family history of aneurysm (first degree relatives only).  · Tobacco use.  PREVENTION   The following healthy lifestyle habits may help decrease your risk of abdominal aortic aneurysm:  · Quitting smoking. Smoking can raise your blood pressure and cause arteriosclerosis.  · Limiting or avoiding alcohol.  · Keeping your blood pressure, blood sugar level, and cholesterol levels within normal limits.  · Decreasing your salt intake. In some people, too much salt can raise blood pressure and increase your risk of abdominal aortic aneurysm.  · Eating a diet low in saturated fats and cholesterol.  · Increasing your fiber intake by including  whole grains, vegetables, and fruits in your diet. Eating these foods may help lower blood pressure.  · Maintaining a healthy weight.  · Staying physically active and exercising regularly.  SYMPTOMS   The symptoms of abdominal aortic aneurysm may vary depending on the size and rate of growth of the aneurysm. Most grow slowly and do not have any symptoms. When symptoms do occur, they may include:  · Pain (abdomen, side, lower back, or groin). The pain may vary in intensity. A sudden onset of severe pain may indicate that the aneurysm has ruptured.  · Feeling full after eating only small amounts of food.  · Nausea or vomiting or both.  · Feeling a pulsating lump in the abdomen.  · Feeling faint or passing out.  DIAGNOSIS   Since most unruptured abdominal aortic aneurysms have no symptoms, they are often discovered during diagnostic exams for other conditions. An aneurysm may be found during the following procedures:  · Ultrasonography (A one-time screening for abdominal aortic aneurysm by ultrasonography is also recommended for all men aged 65-75 years who have ever smoked).   · X-ray exams.  · A computed tomography (CT).  · Magnetic resonance imaging (MRI).  · Angiography or arteriography.  TREATMENT   Treatment of an abdominal aortic aneurysm depends on the size of your aneurysm, your age, and risk factors for rupture. Medication to control blood pressure and pain may be used to manage aneurysms smaller than 6 cm. Regular monitoring for enlargement may be recommended by your caregiver if:  · The aneurysm is 3-4 cm in size (an annual ultrasonography may be recommended).  · The aneurysm is 4-4.5 cm in size (an ultrasonography every 6 months may be recommended).  · The aneurysm is larger than 4.5 cm in size (your caregiver may ask that you be examined by a vascular surgeon).  If your aneurysm is larger than 6 cm, surgical repair may be recommended. There are two main methods for repair of an aneurysm:   · Endovascular  repair (a minimally invasive surgery). This is done most often.  · Open repair. This method is used if an endovascular repair is not possible.     This information is not intended to replace advice given to you by your health care provider. Make sure you discuss any questions you have with your health care provider.     Document Released: 09/27/2006 Document Revised: 04/14/2014 Document Reviewed: 01/17/2014  [employer] Patient Education ©2017 [employer]  Premature Ventricular Contraction  A premature ventricular contraction is an irregularity in the normal heart rhythm. These contractions are extra heartbeats that occur too early in the normal sequence. In most cases, these contractions are harmless and do not require treatment.  CAUSES  Premature ventricular contractions may occur without a known cause. In healthy people, the extra contractions may be caused by:  · Smoking.  · Drinking alcohol.  · Caffeine.  · Certain medicines.  · Some illegal drugs.  · Stress.  Sometimes, changes in chemicals in the blood (electrolytes) can also cause premature ventricular contractions. They can also occur in people with heart diseases that cause a decrease in blood flow to the heart.  SIGNS AND SYMPTOMS  Premature ventricular contractions often do not cause any symptoms. In some cases, you may have a feeling of your heart beating fast or skipping a beat (palpitations).  DIAGNOSIS  Your health care provider will take your medical history and do a physical exam. During the exam, the health care provider will check for irregular heartbeats. Various tests may be done to help diagnose premature ventricular contractions. These tests may include:  · An ECG (electrocardiogram) to monitor the electrical activity of your heart.  · Holter monitor testing. A Holter monitor is a portable device that can monitor the electrical activity of your heart over longer periods of time.  · Stress tests to see how exercise affects your  heart rhythm.  · Echocardiogram. This test uses sound waves (ultrasound) to produce an image of your heart.  · Electrophysiology study. This is used to evaluate the electrical conduction system of your heart.  TREATMENT  Usually, no treatment is needed. You may be advised to avoid things that can trigger the premature contractions, such as caffeine or alcohol. Medicines are sometimes given if symptoms are severe or if the extra heartbeats are very frequent. Treatment may also be needed for an underlying cause of the contractions if one is found.  HOME CARE INSTRUCTIONS  · Take medicines only as directed by your health care provider.  · Make any lifestyle changes recommended by your health care provider. These may include:    Quitting smoking.    Avoiding or limiting caffeine or alcohol.    Exercising. Talk to your health care provider about what type of exercise is safe for you.    Trying to reduce stress.  · Keep all follow-up visits with your health care provider. This is important.  SEEK IMMEDIATE MEDICAL CARE IF:  · You feel palpitations that are frequent or continual.  · You have chest pain.  · You have shortness of breath.  · You have sweating for no reason.  · You have nausea and vomiting.  · You become light-headed or faint.     This information is not intended to replace advice given to you by your health care provider. Make sure you discuss any questions you have with your health care provider.     Document Released: 08/04/2005 Document Revised: 01/08/2016 Document Reviewed: 05/21/2015  [employer] Interactive Patient Education ©2017 [employer]

## 2017-10-23 ENCOUNTER — TELEPHONE (OUTPATIENT)
Dept: CARDIOLOGY | Facility: CLINIC | Age: 77
End: 2017-10-23

## 2017-10-23 NOTE — TELEPHONE ENCOUNTER
----- Message from DENIS Kingsley sent at 10/23/2017 11:57 AM EDT -----  Please advise patient abdominal ultrasound was good.  Can see repair and largest portion 2.4   ----- Message -----     From: Jill Alvarez     Sent: 10/23/2017  11:44 AM       To: DENIS Kingsley

## 2017-11-06 ENCOUNTER — TELEPHONE (OUTPATIENT)
Dept: CARDIOLOGY | Facility: CLINIC | Age: 77
End: 2017-11-06

## 2017-11-06 DIAGNOSIS — I10 ESSENTIAL HYPERTENSION: Primary | ICD-10-CM

## 2017-11-06 RX ORDER — CARVEDILOL 6.25 MG/1
6.25 TABLET ORAL 2 TIMES DAILY
Qty: 60 TABLET | Refills: 5 | Status: SHIPPED | OUTPATIENT
Start: 2017-11-06 | End: 2020-07-16 | Stop reason: SDUPTHER

## 2017-11-06 NOTE — TELEPHONE ENCOUNTER
----- Message from Nataly Rainey MA sent at 11/6/2017  3:17 PM EST -----  Contact: Patient   Patient called stating her Bp has been fluctuating up and down and would like to speak to a nurse.

## 2017-11-06 NOTE — TELEPHONE ENCOUNTER
PATIENT STATES IN AM'S B/P IS HIGH, LATER AM EARLY AFTERNOON B/P GOES LOW, BY EVENING GOES HIGH, THEN BACK DOWN BY LATER EVENING. B/P RUNS 155-160/85-90 AND THEN AT LOW TIMES IT GOES TO 80/50 AND H/R'S 60-70'S. PATIENT IS TAKING IMDUR AROUND 2:00 PM AND LOSARTAN AT AROUND 7-8:00 PM. AM B/P IS HIGH BEFORE MEDS. VERBAL ORDERS PER WINTER CARBALLO NP TO DECREASE COREG TO 6.25 BID , MONITOR B/P AND H/R AND CALL OFFICE IN 1-2 WEEKS WITH B/P READINGS. PATIENT AND  BOTH AWARE. PH,LPN

## 2017-11-21 ENCOUNTER — TELEPHONE (OUTPATIENT)
Dept: CARDIOLOGY | Facility: CLINIC | Age: 77
End: 2017-11-21

## 2017-11-21 NOTE — TELEPHONE ENCOUNTER
We will have the patient cut losartan 25mg 1/2 and take 1/2 bid. She will call with update after 1 week before we change any more medications.

## 2017-11-21 NOTE — TELEPHONE ENCOUNTER
----- Message from Penny Nair MA sent at 11/21/2017  3:10 PM EST -----  Patient's  called wanting to talk to a nurse about blood pressures. Call back number is [phone]

## 2018-04-17 ENCOUNTER — OFFICE VISIT (OUTPATIENT)
Dept: CARDIOLOGY | Facility: CLINIC | Age: 78
End: 2018-04-17

## 2018-04-17 VITALS
BODY MASS INDEX: 30.35 KG/M2 | SYSTOLIC BLOOD PRESSURE: 138 MMHG | DIASTOLIC BLOOD PRESSURE: 80 MMHG | HEIGHT: 67 IN | OXYGEN SATURATION: 96 % | WEIGHT: 193.4 LBS | HEART RATE: 61 BPM

## 2018-04-17 DIAGNOSIS — I71.40 ABDOMINAL AORTIC ANEURYSM (AAA) WITHOUT RUPTURE (HCC): ICD-10-CM

## 2018-04-17 DIAGNOSIS — I10 ESSENTIAL HYPERTENSION: ICD-10-CM

## 2018-04-17 DIAGNOSIS — I25.10 CAD, MULTIPLE VESSEL: Primary | ICD-10-CM

## 2018-04-17 DIAGNOSIS — E78.5 HYPERLIPIDEMIA, UNSPECIFIED HYPERLIPIDEMIA TYPE: ICD-10-CM

## 2018-04-17 PROCEDURE — 93000 ELECTROCARDIOGRAM COMPLETE: CPT | Performed by: NURSE PRACTITIONER

## 2018-04-17 PROCEDURE — 99213 OFFICE O/P EST LOW 20 MIN: CPT | Performed by: NURSE PRACTITIONER

## 2018-04-17 NOTE — PROGRESS NOTES
Subjective   Marissa Carey is a 77 y.o. female     Chief Complaint   Patient presents with   • Hypertension     presents as a follow up       HPI    Problem List:    1) Coronary Artery Disease   a. Cath in 2005 by Dr. Galloway revealing multivessel coronary artery disease s/p CABG by Dr. Soto with SHEN   to first diagonal, SVG to OM, SVG to PDA   b. Cath in 2012 revealing triple vessel disease with LIMA to LAD occluded with collaterals from RCA to LAD, with other grafts patents and medical management recommended    c. Stress Test at [facility]5-28-15 - no ischemia, low risk    d. Cath 7/23/15 - Stent RCA   e. Stress Test 10/24/16 - dense relatively fixed apical defect without an assoc wall motion abnormality most c/w attenuation; preserved post LVEF; indeterminate study like portending low risk   2) Hypertension  2.1) Echo 10/4/16 - mild LVH; EF 60-65%; DD II; early MAC, mild MR, TR and KS; PA 20-25   3) Dyslipidemia  4) Obesity BMI 37  5) Cerebrovascular disease   a. Hx of TIA with workup in 2008 which was negative  6) Peripheral Vascular disease    a. SHABBIR - < 20% MARIAN; < 50% LICA   b. SHABBIR 10/4/16 - MARIAN and LICA and bifurcation; antegrade flow   c. BLE arterial duplex at [facility]. 5-28-15 mild atherosclerotic change in BLE, no sign. plaque or stenoses  7) Osteoarthritis  8) GERD  9) Raynaud's disease.  10) Anxiety/Depression  11) Event Monitor 7/7-7/20/15 - SB - NSR with PVCs      12)  AAA w/repair 1997        A. Abdominal US 7/24/14 - 2.4 CM        B. Abdominal ultrasound 10/20/17-previous aneurysm repair, graft is patent, mid aorta measures up to 2.4 cm    Patient is a 77-year-old female who presents today for follow-up with her  at her side.  Patient denies any chest pain, pressure, palpitations, fluttering, presyncope, syncope, orthopnea, PND or edema.  She says she will get dizzy at times but no more than she had in the past.  She denies any shortness of breath with activity.  She  was complaining of she has some redness in her right big toe with a little bit in her left big toe.  Pulses were good she has diagnosed with Raynauds in the past.  She plans on following up with a new podiatrist possibly in [place].  PCP monitors her cholesterol.    Current Outpatient Prescriptions   Medication Sig Dispense Refill   • aspirin 81 MG tablet Take 1 tablet by mouth daily.     • carvedilol (COREG) 6.25 MG tablet Take 1 tablet by mouth 2 (Two) Times a Day. 60 tablet 5   • clopidogrel (PLAVIX) 75 MG tablet Take 1 tablet by mouth daily.     • Docusate Calcium (STOOL SOFTENER PO) Take 1 tablet by mouth daily.     • furosemide (LASIX) 20 MG tablet Take  by mouth Daily As Needed.     • HYDROcodone-acetaminophen (NORCO) 7.5-325 MG per tablet prn     • isosorbide mononitrate (IMDUR) 30 MG 24 hr tablet Take 0.5 tablets by mouth Daily. 1/2 tab bid (Patient taking differently: Take 15 mg by mouth Daily.) 30 tablet 0   • LINZESS 290 MCG capsule capsule As Needed.     • losartan (COZAAR) 25 MG tablet Take 1 tablet by mouth Daily. (Patient taking differently: Take 12.5 mg by mouth 2 (Two) Times a Day.) 30 tablet 0   • nitroglycerin (NITROSTAT) 0.4 MG SL tablet Place  under the tongue. Place 1 tablet under the tongue every 5 minutes for up to doses as needed for chest pain . Call 911 if pain persists     • Omega-3 Fatty Acids (FISH OIL) 1200 MG capsule capsule Take  by mouth. With 360 mg omega 3 bid     • ranolazine (RANEXA) 500 MG 12 hr tablet Take 1 tablet by mouth every 12 (twelve) hours.     • rosuvastatin (CRESTOR) 20 MG tablet Take 1 tablet by mouth daily.       No current facility-administered medications for this visit.        ALLERGIES    Review of patient's allergies indicates no known allergies.    Past Medical History:   Diagnosis Date   • Aortic aneurysm    • Bilateral foot pain    • CAD, multiple vessel    • Carotid bruit    • Chest pain    • Claudication    • Deviated septum    • Diminished pulses in  lower extremity    • Edema    • Former smoker    • GERD (gastroesophageal reflux disease)    • Hyperlipidemia    • Hypertension    • Neuropathy    • Palpitations    • Pulmonary hypertension    • TIA (transient ischemic attack)    • Tinnitus    • Vision impairment        Social History     Social History   • Marital status:      Spouse name: N/A   • Number of children: N/A   • Years of education: N/A     Occupational History   • Not on file.     Social History Main Topics   • Smoking status: Former Smoker   • Smokeless tobacco: Never Used   • Alcohol use No   • Drug use: No   • Sexual activity: Defer     Other Topics Concern   • Not on file     Social History Narrative   • No narrative on file       Family History   Problem Relation Age of Onset   • Other Mother      acute myocardial infarction   • Aneurysm Mother    • Goiter Mother    • Aneurysm Father    • Sudden death Father    • Heart failure Sister    • Other Other      leukemia   • Cancer Other      thyroid       Review of Systems   Constitutional: Positive for fatigue. Negative for diaphoresis.   HENT: Positive for hearing loss, postnasal drip, rhinorrhea and sneezing. Negative for sinus pressure.    Eyes: Positive for visual disturbance (wears glasses ).   Respiratory: Negative for chest tightness and shortness of breath.    Cardiovascular: Negative for chest pain, palpitations and leg swelling.   Gastrointestinal: Positive for constipation and nausea (every morning ). Negative for diarrhea and vomiting.   Endocrine: Negative.    Genitourinary: Negative for difficulty urinating.   Musculoskeletal: Positive for arthralgias, gait problem (off balance ) and myalgias. Negative for neck pain.   Skin: Negative.    Allergic/Immunologic: Negative for environmental allergies.   Neurological: Positive for dizziness (none more than before). Negative for syncope, light-headedness and headaches.   Hematological: Bruises/bleeds easily.   Psychiatric/Behavioral: The  "patient is nervous/anxious.        Objective   /80 (BP Location: Left arm, Patient Position: Sitting)   Pulse 61   Ht 170.2 cm (67\")   Wt 87.7 kg (193 lb 6.4 oz)   SpO2 96%   BMI 30.29 kg/m²   Vitals:    04/17/18 1059   BP: 138/80   BP Location: Left arm   Patient Position: Sitting   Pulse: 61   SpO2: 96%   Weight: 87.7 kg (193 lb 6.4 oz)   Height: 170.2 cm (67\")      Lab Results (most recent)     None        Physical Exam   Constitutional: She is oriented to person, place, and time. Vital signs are normal. She appears well-developed and well-nourished. She is active and cooperative.   HENT:   Head: Normocephalic.   Eyes: Lids are normal.   Wears glasses    Neck: Normal carotid pulses, no hepatojugular reflux and no JVD present. Carotid bruit is not present.   Cardiovascular: Normal rate, regular rhythm and normal heart sounds.    Pulses:       Radial pulses are 2+ on the right side, and 2+ on the left side.        Dorsalis pedis pulses are 2+ on the right side, and 2+ on the left side.        Posterior tibial pulses are 2+ on the right side, and 2+ on the left side.   No edema BLE.    Pulmonary/Chest: Effort normal and breath sounds normal.   Abdominal: Normal appearance and bowel sounds are normal.   Musculoskeletal:   Uses a cane    Neurological: She is alert and oriented to person, place, and time.   Skin: Skin is warm, dry and intact.   Psychiatric: She has a normal mood and affect. Her speech is normal and behavior is normal. Judgment and thought content normal. Cognition and memory are normal.       Procedure     ECG 12 Lead  Date/Time: 4/17/2018 11:24 AM  Performed by: WINTER CARBALLO  Authorized by: WINTER CARBALLO   Comparison: compared with previous ECG from 4/13/2017  Similar to previous ECG  Rhythm: sinus rhythm  Rate: normal  BPM: 65  QRS axis: left  Clinical impression: non-specific ECG and low voltage                 Assessment/Plan      Diagnosis Plan   1. CAD, multiple vessel  ECG 12 " Lead   2. Hyperlipidemia, unspecified hyperlipidemia type     3. Essential hypertension  ECG 12 Lead   4. Abdominal aortic aneurysm (AAA) without rupture         Return in about 6 months (around 10/17/2018).       CAD-patient is on aspirin, beta and statin.  Hyperlipidemia-patient is on Crestor monitor by PCP.  Hypertension-patient doing well.  Abdominal aortic aneurysm-stable.  She will continue her medication regimen.  She will follow-up in 6 months or sooner if any changes.    Patient's Body mass index is 30.29 kg/m². BMI is above normal parameters. Follow-up plan includes:  educational material.      Electronically signed by:

## 2018-10-17 ENCOUNTER — OFFICE VISIT (OUTPATIENT)
Dept: CARDIOLOGY | Facility: CLINIC | Age: 78
End: 2018-10-17

## 2018-10-17 VITALS
HEIGHT: 67 IN | HEART RATE: 69 BPM | SYSTOLIC BLOOD PRESSURE: 134 MMHG | BODY MASS INDEX: 29.38 KG/M2 | WEIGHT: 187.2 LBS | OXYGEN SATURATION: 97 % | DIASTOLIC BLOOD PRESSURE: 77 MMHG

## 2018-10-17 DIAGNOSIS — E78.5 HYPERLIPIDEMIA, UNSPECIFIED HYPERLIPIDEMIA TYPE: ICD-10-CM

## 2018-10-17 DIAGNOSIS — I10 ESSENTIAL HYPERTENSION: ICD-10-CM

## 2018-10-17 DIAGNOSIS — R06.02 SHORTNESS OF BREATH: ICD-10-CM

## 2018-10-17 DIAGNOSIS — I25.10 CAD, MULTIPLE VESSEL: Primary | ICD-10-CM

## 2018-10-17 PROCEDURE — 99213 OFFICE O/P EST LOW 20 MIN: CPT | Performed by: NURSE PRACTITIONER

## 2018-10-17 NOTE — PROGRESS NOTES
Subjective   Marissa Carey is a 78 y.o. female     Chief Complaint   Patient presents with   • Hypertension     presents as a follow up   • Hyperlipidemia       HPI    Problem List:    1) Coronary Artery Disease   a. Cath in 2005 by Dr. Galloway revealing multivessel coronary artery disease s/p CABG by Dr. Soto with SHEN   to first diagonal, SVG to OM, SVG to PDA   b. Cath in 2012 revealing triple vessel disease with LIMA to LAD occluded with collaterals from RCA to LAD, with other grafts patents and medical management recommended    c. Stress Test at [facility]5-28-15 - no ischemia, low risk    d. Cath 7/23/15 - Stent RCA   e. Stress Test 10/24/16 - dense relatively fixed apical defect without an assoc wall motion abnormality most c/w attenuation; preserved post LVEF; indeterminate study like portending low risk   2) Hypertension  2.1) Echo 10/4/16 - mild LVH; EF 60-65%; DD II; early MAC, mild MR, TR and FL; PA 20-25   3) Dyslipidemia  4) Obesity BMI 37  5) Cerebrovascular disease   a. Hx of TIA with workup in 2008 which was negative  6) Peripheral Vascular disease    a. SHABBIR - < 20% MARIAN; < 50% LICA   b. SHABBIR 10/4/16 - MARIAN and LICA and bifurcation; antegrade flow   c. BLE arterial duplex at [facility]. 5-28-15 mild atherosclerotic change in BLE, no sign. plaque or stenoses  7) Osteoarthritis  8) GERD  9) Raynaud's disease.  10) Anxiety/Depression  11) Event Monitor 7/7-7/20/15 - SB - NSR with PVCs      12)  AAA w/repair 1997        A. Abdominal US 7/24/14 - 2.4 CM        B. Abdominal ultrasound 10/20/17-previous aneurysm repair, graft is patent, mid aorta measures up to 2.4 cm    Patient is a 78-year-old female who presents today for follow-up with her  at her side.  She denies any chest pain, pressure, palpitations, fluttering, dizziness, presyncope, syncope, orthopnea, PND or edema.  She says she only gets short of breath when she overdoes it which is in a lot.  She says she pulled  flowers however gardening when she was in that she got little winded but otherwise she is fine.  She says that her uncle was recently diagnosed with pancreatic cancer and he is not doing very good to that when he go see him.    Current Outpatient Prescriptions   Medication Sig Dispense Refill   • aspirin 81 MG tablet Take 1 tablet by mouth daily.     • carvedilol (COREG) 6.25 MG tablet Take 1 tablet by mouth 2 (Two) Times a Day. 60 tablet 5   • clopidogrel (PLAVIX) 75 MG tablet Take 1 tablet by mouth daily.     • Docusate Calcium (STOOL SOFTENER PO) Take 1 tablet by mouth daily.     • furosemide (LASIX) 20 MG tablet Take 20 mg by mouth Daily As Needed.     • HYDROcodone-acetaminophen (NORCO) 7.5-325 MG per tablet prn     • LINZESS 145 MCG capsule capsule 145 mcg As Needed.     • losartan (COZAAR) 25 MG tablet Take 1 tablet by mouth Daily. (Patient taking differently: Take 25 mg by mouth 2 (Two) Times a Day.) 30 tablet 0   • nitroglycerin (NITROSTAT) 0.4 MG SL tablet Place  under the tongue. Place 1 tablet under the tongue every 5 minutes for up to doses as needed for chest pain . Call 911 if pain persists     • Omega-3 Fatty Acids (FISH OIL) 1200 MG capsule capsule Take  by mouth. With 360 mg omega 3 bid     • ranolazine (RANEXA) 500 MG 12 hr tablet Take 1 tablet by mouth every 12 (twelve) hours.     • rosuvastatin (CRESTOR) 20 MG tablet Take 1 tablet by mouth daily.     • sertraline (ZOLOFT) 50 MG tablet Take 50 mg by mouth Daily.       No current facility-administered medications for this visit.        ALLERGIES    Patient has no known allergies.    Past Medical History:   Diagnosis Date   • Aortic aneurysm (CMS/HCC)    • Bilateral foot pain    • CAD, multiple vessel    • Carotid bruit    • Chest pain    • Claudication (CMS/HCC)    • Deviated septum    • Diminished pulses in lower extremity    • Edema    • Former smoker    • GERD (gastroesophageal reflux disease)    • Hyperlipidemia    • Hypertension    •  "Neuropathy    • Palpitations    • Pulmonary hypertension (CMS/HCC)    • TIA (transient ischemic attack)    • Tinnitus    • Vision impairment        Social History     Social History   • Marital status:      Spouse name: N/A   • Number of children: N/A   • Years of education: N/A     Occupational History   • Not on file.     Social History Main Topics   • Smoking status: Former Smoker   • Smokeless tobacco: Never Used   • Alcohol use No   • Drug use: No   • Sexual activity: Defer     Other Topics Concern   • Not on file     Social History Narrative   • No narrative on file       Family History   Problem Relation Age of Onset   • Other Mother         acute myocardial infarction   • Aneurysm Mother    • Goiter Mother    • Aneurysm Father    • Sudden death Father    • Heart failure Sister    • Other Other         leukemia   • Cancer Other         thyroid       Review of Systems   Constitutional: Positive for fatigue. Negative for diaphoresis.   HENT: Positive for congestion and postnasal drip.    Eyes: Positive for visual disturbance ( wears glasses).   Respiratory: Positive for shortness of breath ( with activity; when over does it ). Negative for chest tightness.    Cardiovascular: Negative for chest pain, palpitations and leg swelling.   Gastrointestinal: Positive for constipation. Negative for diarrhea, nausea and vomiting.   Endocrine: Negative.    Genitourinary: Negative for difficulty urinating.   Musculoskeletal: Positive for arthralgias, back pain (scoliosis ), gait problem (uses a cane ) and myalgias ( ). Negative for neck pain.   Skin: Negative.    Allergic/Immunologic: Negative for environmental allergies and food allergies.   Neurological: Negative for dizziness, syncope and light-headedness.   Hematological: Bruises/bleeds easily.   Psychiatric/Behavioral: The patient is nervous/anxious.        Objective   /77 (BP Location: Left arm, Patient Position: Sitting)   Pulse 69   Ht 170.2 cm (67\")  " " Wt 84.9 kg (187 lb 3.2 oz)   SpO2 97%   BMI 29.32 kg/m²   Vitals:    10/17/18 1039   BP: 134/77   BP Location: Left arm   Patient Position: Sitting   Pulse: 69   SpO2: 97%   Weight: 84.9 kg (187 lb 3.2 oz)   Height: 170.2 cm (67\")      Lab Results (most recent)     None        Physical Exam   Constitutional: She is oriented to person, place, and time. Vital signs are normal. She appears well-developed and well-nourished. She is active and cooperative.   HENT:   Head: Normocephalic.   Eyes: Lids are normal.   Wears glasses    Neck: Normal carotid pulses, no hepatojugular reflux and no JVD present. Carotid bruit is not present.   Cardiovascular: Normal rate, regular rhythm and normal heart sounds.    Pulses:       Radial pulses are 2+ on the right side, and 2+ on the left side.        Dorsalis pedis pulses are 2+ on the right side, and 2+ on the left side.        Posterior tibial pulses are 2+ on the right side, and 2+ on the left side.   No edema BLE.   Pulmonary/Chest: Effort normal and breath sounds normal.   Abdominal: Normal appearance and bowel sounds are normal.   Musculoskeletal:   Uses a cane   Neurological: She is alert and oriented to person, place, and time.   Skin: Skin is warm and dry. Abrasion (right shin ) and bruising (all extremities ) noted.   Psychiatric: She has a normal mood and affect. Her speech is normal and behavior is normal. Judgment and thought content normal. Cognition and memory are normal.       Procedure   Procedures         Assessment/Plan      Diagnosis Plan   1. CAD, multiple vessel     2. Hyperlipidemia, unspecified hyperlipidemia type     3. Essential hypertension     4. Shortness of breath         Return in about 6 months (around 4/17/2019).    CAD-patient is on aspirin, beta and statin.  Hyperlipidemia-patient is on Crestor monitor by PCP which she brought labs and they were excellent.  Hypertension-patient doing well.  Shortness of breath - stable.  She will continue her " medication regimen.  She'll follow-up in 6 months or sooner if any changes.       Patient's Body mass index is 29.32 kg/m². BMI is above normal parameters. Recommendations include: educational material.      Electronically signed by:

## 2018-10-17 NOTE — PATIENT INSTRUCTIONS
Obesity, Adult  Obesity is the condition of having too much total body fat. Being overweight or obese means that your weight is greater than what is considered healthy for your body size. Obesity is determined by a measurement called BMI. BMI is an estimate of body fat and is calculated from height and weight. For adults, a BMI of 30 or higher is considered obese.  Obesity can eventually lead to other health concerns and major illnesses, including:  · Stroke.  · Coronary artery disease (CAD).  · Type 2 diabetes.  · Some types of cancer, including cancers of the colon, breast, uterus, and gallbladder.  · Osteoarthritis.  · High blood pressure (hypertension).  · High cholesterol.  · Sleep apnea.  · Gallbladder stones.  · Infertility problems.    What are the causes?  The main cause of obesity is taking in (consuming) more calories than your body uses for energy. Other factors that contribute to this condition may include:  · Being born with genes that make you more likely to become obese.  · Having a medical condition that causes obesity. These conditions include:  ? Hypothyroidism.  ? Polycystic ovarian syndrome (PCOS).  ? Binge-eating disorder.  ? Cushing syndrome.  · Taking certain medicines, such as steroids, antidepressants, and seizure medicines.  · Not being physically active (sedentary lifestyle).  · Living where there are limited places to exercise safely or buy healthy foods.  · Not getting enough sleep.    What increases the risk?  The following factors may increase your risk of this condition:  · Having a family history of obesity.  · Being a woman of -American descent.  · Being a man of  descent.    What are the signs or symptoms?  Having excessive body fat is the main symptom of this condition.  How is this diagnosed?  This condition may be diagnosed based on:  · Your symptoms.  · Your medical history.  · A physical exam. Your health care provider may measure:  ? Your BMI. If you are an  adult with a BMI between 25 and less than 30, you are considered overweight. If you are an adult with a BMI of 30 or higher, you are considered obese.  ? The distances around your hips and your waist (circumferences). These may be compared to each other to help diagnose your condition.  ? Your skinfold thickness. Your health care provider may gently pinch a fold of your skin and measure it.    How is this treated?  Treatment for this condition often includes changing your lifestyle. Treatment may include some or all of the following:  · Dietary changes. Work with your health care provider and a dietitian to set a weight-loss goal that is healthy and reasonable for you. Dietary changes may include eating:  ? Smaller portions. A portion size is the amount of a particular food that is healthy for you to eat at one time. This varies from person to person.  ? Low-calorie or low-fat options.  ? More whole grains, fruits, and vegetables.  · Regular physical activity. This may include aerobic activity (cardio) and strength training.  · Medicine to help you lose weight. Your health care provider may prescribe medicine if you are unable to lose 1 pound a week after 6 weeks of eating more healthily and doing more physical activity.  · Surgery. Surgical options may include gastric banding and gastric bypass. Surgery may be done if:  ? Other treatments have not helped to improve your condition.  ? You have a BMI of 40 or higher.  ? You have life-threatening health problems related to obesity.    Follow these instructions at home:    Eating and drinking    · Follow recommendations from your health care provider about what you eat and drink. Your health care provider may advise you to:  ? Limit fast foods, sweets, and processed snack foods.  ? Choose low-fat options, such as low-fat milk instead of whole milk.  ? Eat 5 or more servings of fruits or vegetables every day.  ? Eat at home more often. This gives you more control over  what you eat.  ? Choose healthy foods when you eat out.  ? Learn what a healthy portion size is.  ? Keep low-fat snacks on hand.  ? Avoid sugary drinks, such as soda, fruit juice, iced tea sweetened with sugar, and flavored milk.  ? Eat a healthy breakfast.  · Drink enough water to keep your urine clear or pale yellow.  · Do not go without eating for long periods of time (do not fast) or follow a fad diet. Fasting and fad diets can be unhealthy and even dangerous.  Physical Activity  · Exercise regularly, as told by your health care provider. Ask your health care provider what types of exercise are safe for you and how often you should exercise.  · Warm up and stretch before being active.  · Cool down and stretch after being active.  · Rest between periods of activity.  Lifestyle  · Limit the time that you spend in front of your TV, computer, or video game system.  · Find ways to reward yourself that do not involve food.  · Limit alcohol intake to no more than 1 drink a day for nonpregnant women and 2 drinks a day for men. One drink equals 12 oz of beer, 5 oz of wine, or 1½ oz of hard liquor.  General instructions  · Keep a weight loss journal to keep track of the food you eat and how much you exercise you get.  · Take over-the-counter and prescription medicines only as told by your health care provider.  · Take vitamins and supplements only as told by your health care provider.  · Consider joining a support group. Your health care provider may be able to recommend a support group.  · Keep all follow-up visits as told by your health care provider. This is important.  Contact a health care provider if:  · You are unable to meet your weight loss goal after 6 weeks of dietary and lifestyle changes.  This information is not intended to replace advice given to you by your health care provider. Make sure you discuss any questions you have with your health care provider.  Document Released: 01/25/2006 Document Revised:  05/22/2017 Document Reviewed: 10/05/2016  [employer] Interactive Patient Education © 2018 [employer]  MyPlate from [employer]  The general, healthful diet is based on the 2010 Dietary Guidelines for Americans. The amount of food you need to eat from each food group depends on your age, sex, and level of physical activity and can be individualized by a dietitian. Go to ChooseMyPlate.gov for more information.  What do I need to know about the MyPlate plan?  · Enjoy your food, but eat less.  · Avoid oversized portions.  ? ½ of your plate should include fruits and vegetables.  ? ¼ of your plate should be grains.  ? ¼ of your plate should be protein.  Grains  · Make at least half of your grains whole grains.  · For a 2,000 calorie daily food plan, eat 6 oz every day.  · 1 oz is about 1 slice bread, 1 cup cereal, or ½ cup cooked rice, cereal, or pasta.  Vegetables  · Make half your plate fruits and vegetables.  · For a 2,000 calorie daily food plan, eat 2½ cups every day.  · 1 cup is about 1 cup raw or cooked vegetables or vegetable juice or 2 cups raw leafy greens.  Fruits  · Make half your plate fruits and vegetables.  · For a 2,000 calorie daily food plan, eat 2 cups every day.  · 1 cup is about 1 cup fruit or 100% fruit juice or ½ cup dried fruit.  Protein  · For a 2,000 calorie daily food plan, eat 5½ oz every day.  · 1 oz is about 1 oz meat, poultry, or fish, ¼ cup cooked beans, 1 egg, 1 Tbsp peanut butter, or ½ oz nuts or seeds.  Dairy  · Switch to fat-free or low-fat (1%) milk.  · For a 2,000 calorie daily food plan, eat 3 cups every day.  · 1 cup is about 1 cup milk or yogurt or soy milk (soy beverage), 1½ oz natural cheese, or 2 oz processed cheese.  Fats, Oils, and Empty Calories  · Only small amounts of oils are recommended.  · Empty calories are calories from solid fats or added sugars.  · Compare sodium in foods like soup, bread, and frozen meals. Choose the foods with lower numbers.  · Drink water instead of  sugary drinks.  What foods can I eat?  Grains  Whole grains such as whole wheat, quinoa, millet, and bulgur. Bread, rolls, and pasta made from whole grains. Brown or wild rice. Hot or cold cereals made from whole grains and without added sugar.  Vegetables  All fresh vegetables, especially fresh red, dark green, or orange vegetables. Peas and beans. Low-sodium frozen or canned vegetables prepared without added salt. Low-sodium vegetable juices.  Fruits  All fresh, frozen, and dried fruits. Canned fruit packed in water or fruit juice without added sugar. Fruit juices without added sugar.  Meats and Other Protein Sources  Boiled, baked, or grilled lean meat trimmed of fat. Skinless poultry. Fresh seafood and shellfish. Canned seafood packed in water. Unsalted nuts and unsalted nut butters. Tofu. Dried beans and pea. Eggs.  Dairy  Low-fat or fat-free milk, yogurt, and cheeses.  Sweets and Desserts  Frozen desserts made from low-fat milk.  Fats and Oils  Olive, peanut, and canola oils and margarine. Salad dressing and mayonnaise made from these oils.  Other  Soups and casseroles made from allowed ingredients and without added fat or salt.  The items listed above may not be a complete list of recommended foods or beverages. Contact your dietitian for more options.  What foods are not recommended?  Grains  Sweetened, low-fiber cereals. Packaged baked goods. Snack crackers and chips. Cheese crackers, butter crackers, and biscuits. Frozen waffles, sweet breads, doughnuts, pastries, packaged baking mixes, pancakes, cakes, and cookies.  Vegetables  Regular canned or frozen vegetables or vegetables prepared with salt. Canned tomatoes. Canned tomato sauce. Fried vegetables. Vegetables in cream sauce or cheese sauce.  Fruits  Fruits packed in syrup or made with added sugar.  Meats and Other Protein Sources  Marbled or fatty meats such as ribs. Poultry with skin. Fried meats, poultry, eggs, or fish. Sausages, hot dogs, and deli  meats such as pastrami, bologna, or salami.  Dairy  Whole milk, cream, cheeses made from whole milk, sour cream. Ice cream or yogurt made from whole milk or with added sugar.  Beverages  For adults, no more than one alcoholic drink per day. Regular soft drinks or other sugary beverages. Juice drinks.  Sweets and Desserts  Sugary or fatty desserts, candy, and other sweets.  Fats and Oils  Solid shortening or partially hydrogenated oils. Solid margarine. Margarine that contains trans fats. Butter.  The items listed above may not be a complete list of foods and beverages to avoid. Contact your dietitian for more information.  This information is not intended to replace advice given to you by your health care provider. Make sure you discuss any questions you have with your health care provider.  Document Released: 01/06/2009 Document Revised: 05/25/2017 Document Reviewed: 11/26/2014  [employer] Interactive Patient Education © 2018 [employer] Inc.  MyPlate from [employer]  The general, healthful diet is based on the 2010 Dietary Guidelines for Americans. The amount of food you need to eat from each food group depends on your age, sex, and level of physical activity and can be individualized by a dietitian. Go to ChooseMyPlate.gov for more information.  What do I need to know about the MyPlate plan?  · Enjoy your food, but eat less.  · Avoid oversized portions.  ? ½ of your plate should include fruits and vegetables.  ? ¼ of your plate should be grains.  ? ¼ of your plate should be protein.  Grains  · Make at least half of your grains whole grains.  · For a 2,000 calorie daily food plan, eat 6 oz every day.  · 1 oz is about 1 slice bread, 1 cup cereal, or ½ cup cooked rice, cereal, or pasta.  Vegetables  · Make half your plate fruits and vegetables.  · For a 2,000 calorie daily food plan, eat 2½ cups every day.  · 1 cup is about 1 cup raw or cooked vegetables or vegetable juice or 2 cups raw leafy greens.  Fruits  · Make half your  plate fruits and vegetables.  · For a 2,000 calorie daily food plan, eat 2 cups every day.  · 1 cup is about 1 cup fruit or 100% fruit juice or ½ cup dried fruit.  Protein  · For a 2,000 calorie daily food plan, eat 5½ oz every day.  · 1 oz is about 1 oz meat, poultry, or fish, ¼ cup cooked beans, 1 egg, 1 Tbsp peanut butter, or ½ oz nuts or seeds.  Dairy  · Switch to fat-free or low-fat (1%) milk.  · For a 2,000 calorie daily food plan, eat 3 cups every day.  · 1 cup is about 1 cup milk or yogurt or soy milk (soy beverage), 1½ oz natural cheese, or 2 oz processed cheese.  Fats, Oils, and Empty Calories  · Only small amounts of oils are recommended.  · Empty calories are calories from solid fats or added sugars.  · Compare sodium in foods like soup, bread, and frozen meals. Choose the foods with lower numbers.  · Drink water instead of sugary drinks.  What foods can I eat?  Grains  Whole grains such as whole wheat, quinoa, millet, and bulgur. Bread, rolls, and pasta made from whole grains. Brown or wild rice. Hot or cold cereals made from whole grains and without added sugar.  Vegetables  All fresh vegetables, especially fresh red, dark green, or orange vegetables. Peas and beans. Low-sodium frozen or canned vegetables prepared without added salt. Low-sodium vegetable juices.  Fruits  All fresh, frozen, and dried fruits. Canned fruit packed in water or fruit juice without added sugar. Fruit juices without added sugar.  Meats and Other Protein Sources  Boiled, baked, or grilled lean meat trimmed of fat. Skinless poultry. Fresh seafood and shellfish. Canned seafood packed in water. Unsalted nuts and unsalted nut butters. Tofu. Dried beans and pea. Eggs.  Dairy  Low-fat or fat-free milk, yogurt, and cheeses.  Sweets and Desserts  Frozen desserts made from low-fat milk.  Fats and Oils  Olive, peanut, and canola oils and margarine. Salad dressing and mayonnaise made from these oils.  Other  Soups and casseroles made from  allowed ingredients and without added fat or salt.  The items listed above may not be a complete list of recommended foods or beverages. Contact your dietitian for more options.  What foods are not recommended?  Grains  Sweetened, low-fiber cereals. Packaged baked goods. Snack crackers and chips. Cheese crackers, butter crackers, and biscuits. Frozen waffles, sweet breads, doughnuts, pastries, packaged baking mixes, pancakes, cakes, and cookies.  Vegetables  Regular canned or frozen vegetables or vegetables prepared with salt. Canned tomatoes. Canned tomato sauce. Fried vegetables. Vegetables in cream sauce or cheese sauce.  Fruits  Fruits packed in syrup or made with added sugar.  Meats and Other Protein Sources  Marbled or fatty meats such as ribs. Poultry with skin. Fried meats, poultry, eggs, or fish. Sausages, hot dogs, and deli meats such as pastrami, bologna, or salami.  Dairy  Whole milk, cream, cheeses made from whole milk, sour cream. Ice cream or yogurt made from whole milk or with added sugar.  Beverages  For adults, no more than one alcoholic drink per day. Regular soft drinks or other sugary beverages. Juice drinks.  Sweets and Desserts  Sugary or fatty desserts, candy, and other sweets.  Fats and Oils  Solid shortening or partially hydrogenated oils. Solid margarine. Margarine that contains trans fats. Butter.  The items listed above may not be a complete list of foods and beverages to avoid. Contact your dietitian for more information.  This information is not intended to replace advice given to you by your health care provider. Make sure you discuss any questions you have with your health care provider.  Document Released: 01/06/2009 Document Revised: 05/25/2017 Document Reviewed: 11/26/2014  [employer] Interactive Patient Education © 2018 [employer] Inc.

## 2019-04-29 ENCOUNTER — OFFICE VISIT (OUTPATIENT)
Dept: CARDIOLOGY | Facility: CLINIC | Age: 79
End: 2019-04-29

## 2019-04-29 VITALS
SYSTOLIC BLOOD PRESSURE: 130 MMHG | HEIGHT: 67 IN | OXYGEN SATURATION: 98 % | DIASTOLIC BLOOD PRESSURE: 82 MMHG | BODY MASS INDEX: 29.82 KG/M2 | WEIGHT: 190 LBS | HEART RATE: 57 BPM

## 2019-04-29 DIAGNOSIS — I27.20 PULMONARY HYPERTENSION (HCC): ICD-10-CM

## 2019-04-29 DIAGNOSIS — E78.5 HYPERLIPIDEMIA, UNSPECIFIED HYPERLIPIDEMIA TYPE: ICD-10-CM

## 2019-04-29 DIAGNOSIS — I10 ESSENTIAL HYPERTENSION: ICD-10-CM

## 2019-04-29 DIAGNOSIS — I25.10 CAD, MULTIPLE VESSEL: Primary | ICD-10-CM

## 2019-04-29 DIAGNOSIS — I71.40 ABDOMINAL AORTIC ANEURYSM (AAA) WITHOUT RUPTURE (HCC): ICD-10-CM

## 2019-04-29 DIAGNOSIS — R06.02 SHORTNESS OF BREATH: ICD-10-CM

## 2019-04-29 PROCEDURE — 93000 ELECTROCARDIOGRAM COMPLETE: CPT | Performed by: NURSE PRACTITIONER

## 2019-04-29 PROCEDURE — 99213 OFFICE O/P EST LOW 20 MIN: CPT | Performed by: NURSE PRACTITIONER

## 2019-04-29 RX ORDER — VITAMIN B COMPLEX
CAPSULE ORAL DAILY
COMMUNITY
End: 2019-12-02

## 2019-04-29 RX ORDER — PERPHENAZINE 16 MG
TABLET ORAL DAILY
COMMUNITY
End: 2021-04-22

## 2019-04-29 RX ORDER — ISOSORBIDE MONONITRATE 30 MG/1
15 TABLET, EXTENDED RELEASE ORAL DAILY
COMMUNITY
End: 2021-04-27 | Stop reason: SDUPTHER

## 2019-04-29 NOTE — PATIENT INSTRUCTIONS
"Fat and Cholesterol Restricted Eating Plan  Getting too much fat and cholesterol in your diet may cause health problems. Choosing the right foods helps keep your fat and cholesterol at normal levels. This can keep you from getting certain diseases.  Your doctor may recommend an eating plan that includes:  · Total fat: ______% or less of total calories a day.  · Saturated fat: ______% or less of total calories a day.  · Cholesterol: less than _________mg a day.  · Fiber: ______g a day.    What are tips for following this plan?  General tips  · Work with your doctor to lose weight if you need to.  · Avoid:  ? Foods with added sugar.  ? Fried foods.  ? Foods with partially hydrogenated oils.  · Limit alcohol intake to no more than 1 drink a day for nonpregnant women and 2 drinks a day for men. One drink equals 12 oz of beer, 5 oz of wine, or 1½ oz of hard liquor.  Reading food labels  · Check food labels for:  ? Trans fats.  ? Partially hydrogenated oils.  ? Saturated fat (g) in each serving.  ? Cholesterol (mg) in each serving.  ? Fiber (g) in each serving.  · Choose foods with healthy fats, such as:  ? Monounsaturated fats.  ? Polyunsaturated fats.  ? Omega-3 fats.  · Choose grain products that have whole grains. Look for the word \"whole\" as the first word in the ingredient list.  Cooking  · Cook foods using low-fat methods. These include baking, boiling, grilling, and broiling.  · Eat more home-cooked foods. Eat at restaurants and buffets less often.  · Avoid cooking using saturated fats, such as butter, cream, palm oil, palm kernel oil, and coconut oil.  Meal planning  · At meals, divide your plate into four equal parts:  ? Fill one-half of your plate with vegetables and green salads.  ? Fill one-fourth of your plate with whole grains.  ? Fill one-fourth of your plate with low-fat (lean) protein foods.  · Eat fish that is high in omega-3 fats at least two times a week. This includes mackerel, tuna, sardines, and " salmon.  · Eat foods that are high in fiber, such as whole grains, beans, apples, broccoli, carrots, peas, and barley.  Recommended foods  Grains  · Whole grains, such as whole wheat or whole grain breads, crackers, cereals, and pasta. Unsweetened oatmeal, bulgur, barley, quinoa, or brown rice. Corn or whole wheat flour tortillas.  Vegetables  · Fresh or frozen vegetables (raw, steamed, roasted, or grilled). Green salads.  Fruits  · All fresh, canned (in natural juice), or frozen fruits.  Meats and other protein foods  · Ground beef (85% or leaner), grass-fed beef, or beef trimmed of fat. Skinless chicken or turkey. Ground chicken or turkey. Pork trimmed of fat. All fish and seafood. Egg whites. Dried beans, peas, or lentils. Unsalted nuts or seeds. Unsalted canned beans. Nut butters without added sugar or oil.  Dairy  · Low-fat or nonfat dairy products, such as skim or 1% milk, 2% or reduced-fat cheeses, low-fat and fat-free ricotta or cottage cheese, or plain low-fat and nonfat yogurt.  Fats and oils  · Tub margarine without trans fats. Light or reduced-fat mayonnaise and salad dressings. Avocado. Olive, canola, sesame, or safflower oils.  The items listed above may not be a complete list of recommended foods or beverages. Contact your dietitian for more options.  Foods to avoid  Grains  · White bread. White pasta. White rice. Cornbread. Bagels, pastries, and croissants. Crackers and snack foods that contain trans fat and hydrogenated oils.  Vegetables  · Vegetables cooked in cheese, cream, or butter sauce. Fried vegetables.  Fruits  · Canned fruit in heavy syrup. Fruit in cream or butter sauce. Fried fruit.  Meats and other protein foods  · Fatty cuts of meat. Ribs, chicken wings, chino, sausage, bologna, salami, chitterlings, fatback, hot dogs, bratwurst, and packaged lunch meats. Liver and organ meats. Whole eggs and egg yolks. Chicken and turkey with skin. Fried meat.  Dairy  · Whole or 2% milk, cream,  half-and-half, and cream cheese. Whole milk cheeses. Whole-fat or sweetened yogurt. Full-fat cheeses. Nondairy creamers and whipped toppings. Processed cheese, cheese spreads, and cheese curds.  Beverages  · Alcohol. Sugar-sweetened drinks such as sodas, lemonade, and fruit drinks.  Fats and oils  · Butter, stick margarine, lard, shortening, ghee, or chino fat. Coconut, palm kernel, and palm oils.  Sweets and desserts  · Corn syrup, sugars, honey, and molasses. Candy. Jam and jelly. Syrup. Sweetened cereals. Cookies, pies, cakes, donuts, muffins, and ice cream.  The items listed above may not be a complete list of foods and beverages to avoid. Contact your dietitian for more information.  Summary  · Choosing the right foods helps keep your fat and cholesterol at normal levels. This can keep you from getting certain diseases.  · At meals, fill one-half of your plate with vegetables and green salads.  · Eat high-fiber foods, like whole grains, beans, apples, carrots, peas, and barley.  · Limit added sugar, saturated fats, alcohol, and fried foods.  This information is not intended to replace advice given to you by your health care provider. Make sure you discuss any questions you have with your health care provider.  Document Released: 06/18/2013 Document Revised: 09/04/2018 Document Reviewed: 09/04/2018  [employer] Interactive Patient Education © 2019 [employer]  BMI for Adults  Body mass index (BMI) is a number that is calculated from a person's weight and height. In most adults, the number is used to find how much of an adult's weight is made up of fat. BMI is not as accurate as a direct measure of body fat.  How is BMI calculated?  BMI is calculated by dividing weight in kilograms by height in meters squared. It can also be calculated by dividing weight in pounds by height in inches squared, then multiplying the resulting number by 703. Charts are available to help you find your BMI quickly and easily without  doing this calculation.  How is BMI interpreted?  Health care professionals use BMI charts to identify whether an adult is underweight, at a normal weight, or overweight based on the following guidelines:  · Underweight: BMI less than 18.5.  · Normal weight: BMI between 18.5 and 24.9.  · Overweight: BMI between 25 and 29.9.  · Obese: BMI of 30 and above.    BMI is usually interpreted the same for males and females.  Weight includes both fat and muscle, so someone with a muscular build, such as an athlete, may have a BMI that is higher than 24.9. In cases like these, BMI may not accurately depict body fat. To determine if excess body fat is the cause of a BMI of 25 or higher, further assessments may need to be done by a health care provider.  Why is BMI a useful tool?  BMI is used to identify a possible weight problem that may be related to a medical problem or may increase the risk for medical problems. BMI can also be used to promote changes to reach a healthy weight.  This information is not intended to replace advice given to you by your health care provider. Make sure you discuss any questions you have with your health care provider.  Document Released: 08/29/2005 Document Revised: 04/27/2017 Document Reviewed: 05/15/2015  [employer] Interactive Patient Education © 2018 [employer]    Nonspecific Chest Pain  Chest pain can be caused by many different conditions. There is a chance that your pain could be related to something serious, such as a heart attack or a blood clot in your lungs. Chest pain can also be caused by conditions that are not life-threatening. If you have chest pain, it is very important to follow up with your doctor.  Follow these instructions at home:  Medicines  · If you were prescribed an antibiotic medicine, take it as told by your doctor. Do not stop taking the antibiotic even if you start to feel better.  · Take over-the-counter and prescription medicines only as told by your  doctor.  Lifestyle  · Do not use any products that contain nicotine or tobacco, such as cigarettes and e-cigarettes. If you need help quitting, ask your doctor.  · Do not drink alcohol.  · Make lifestyle changes as told by your doctor. These may include:  ? Getting regular exercise. Ask your doctor for some activities that are safe for you.  ? Eating a heart-healthy diet. A diet specialist (dietitian) can help you to learn healthy eating options.  ? Staying at a healthy weight.  ? Managing diabetes, if needed.  ? Lowering your stress, as with deep breathing or spending time in nature.  General instructions  · Avoid any activities that make you feel chest pain.  · If your chest pain is because of heartburn:  ? Raise (elevate) the head of your bed about 6 inches (15 cm). You can do this by putting blocks under the bed legs at the head of the bed.  ? Do not sleep with extra pillows under your head. That does not help heartburn.  · Keep all follow-up visits as told by your doctor. This is important. This includes any further testing if your chest pain does not go away.  Contact a doctor if:  · Your chest pain does not go away.  · You have a rash with blisters on your chest.  · You have a fever.  · You have chills.  Get help right away if:  · Your chest pain is worse.  · You have a cough that gets worse, or you cough up blood.  · You have very bad (severe) pain in your belly (abdomen).  · You are very weak.  · You pass out (faint).  · You have either of these for no clear reason:  ? Sudden chest discomfort.  ? Sudden discomfort in your arms, back, neck, or jaw.  · You have shortness of breath at any time.  · You suddenly start to sweat, or your skin gets clammy.  · You feel sick to your stomach (nauseous).  · You throw up (vomit).  · You suddenly feel light-headed or dizzy.  · Your heart starts to beat fast, or it feels like it is skipping beats.  These symptoms may be an emergency. Do not wait to see if the symptoms  will go away. Get medical help right away. Call your local emergency services (911 in the [place] Do not drive yourself to the hospital.  This information is not intended to replace advice given to you by your health care provider. Make sure you discuss any questions you have with your health care provider.  Document Released: 06/05/2009 Document Revised: 09/11/2017 Document Reviewed: 09/11/2017  [employer] Interactive Patient Education © 2019 [employer]

## 2019-04-29 NOTE — PROGRESS NOTES
Subjective   Marissa Carey is a 78 y.o. female     Chief Complaint   Patient presents with   • Follow-up     Here for a 6 month follow up        HPI    Problem List:    1) Coronary Artery Disease   a. Cath in 2005 by Dr. Galloway revealing multivessel coronary artery disease s/p CABG by Dr. Soto with SHEN   to first diagonal, SVG to OM, SVG to PDA   b. Cath in 2012 revealing triple vessel disease with LIMA to LAD occluded with collaterals from RCA to LAD, with other grafts patents and medical management recommended    c. Stress Test at [facility]5-28-15 - no ischemia, low risk    d. Cath 7/23/15 - Stent RCA   e. Stress Test 10/24/16 - dense relatively fixed apical defect without an assoc wall motion abnormality most c/w attenuation; preserved post LVEF; indeterminate study like portending low risk   2) Hypertension  2.1) Echo 10/4/16 - mild LVH; EF 60-65%; DD II; early MAC, mild MR, TR and RI; PA 20-25   3) Dyslipidemia  4) Obesity BMI 37  5) Cerebrovascular disease   a. Hx of TIA with workup in 2008 which was negative  6) Peripheral Vascular disease    a. SHABBIR - < 20% MARIAN; < 50% LICA   b. SHABBIR 10/4/16 - MARIAN and LICA and bifurcation; antegrade flow   c. BLE arterial duplex at [facility]. 5-28-15 mild atherosclerotic change in BLE, no sign. plaque or stenoses  7) Osteoarthritis  8) GERD  9) Raynaud's disease.  10) Anxiety/Depression  11) Event Monitor 7/7-7/20/15 - SB - NSR with PVCs      12)  AAA w/repair 1997        A. Abdominal US 7/24/14 - 2.4 CM        B. Abdominal ultrasound 10/20/17-previous aneurysm repair, graft is patent, mid aorta measures up to 2.4 cm    Patient is a 78-year-old female who presents today for a follow-up with her  at her side.  She says she has a rare achiness in the middle of her chest.  She says it does not radiate and she does not take nitroglycerin.  She says in the past year it has been a lot less frequently.  She says she does get a little short of breath  when it happens.  She says she has just an occasional flutter.  She denies any dizziness, presyncope, syncope, orthopnea or PND.  She does get lightheaded if she stands too fast and is a little off balance.  She says she does have shortness of breath with minimal exertion but this is not changed.  PCP monitors her cholesterol she brought her labs in which were excellent.    Current Outpatient Medications   Medication Sig Dispense Refill   • Alpha-Lipoic Acid 600 MG capsule Take  by mouth Daily.     • aspirin 81 MG tablet Take 1 tablet by mouth daily.     • B Complex Vitamins (VITAMIN B COMPLEX) capsule capsule Take  by mouth Daily.     • carvedilol (COREG) 6.25 MG tablet Take 1 tablet by mouth 2 (Two) Times a Day. 60 tablet 5   • clopidogrel (PLAVIX) 75 MG tablet Take 1 tablet by mouth daily.     • Docusate Calcium (STOOL SOFTENER PO) Take 1 tablet by mouth daily.     • furosemide (LASIX) 20 MG tablet Take 20 mg by mouth Daily As Needed.     • HYDROcodone-acetaminophen (NORCO) 7.5-325 MG per tablet prn     • isosorbide mononitrate (IMDUR) 30 MG 24 hr tablet Take 30 mg by mouth Daily. Takes 1/2 tablet daily     • LINZESS 145 MCG capsule capsule 145 mcg As Needed.     • losartan (COZAAR) 25 MG tablet Take 1 tablet by mouth Daily. (Patient taking differently: Take 25 mg by mouth 2 (Two) Times a Day.) 30 tablet 0   • nitroglycerin (NITROSTAT) 0.4 MG SL tablet Place  under the tongue. Place 1 tablet under the tongue every 5 minutes for up to doses as needed for chest pain . Call 911 if pain persists     • Omega-3 Fatty Acids (FISH OIL) 1200 MG capsule capsule Take  by mouth. With 360 mg omega 3 bid     • ranolazine (RANEXA) 500 MG 12 hr tablet Take 1 tablet by mouth every 12 (twelve) hours.     • rosuvastatin (CRESTOR) 20 MG tablet Take 1 tablet by mouth daily.     • sertraline (ZOLOFT) 50 MG tablet Take 50 mg by mouth Daily.       No current facility-administered medications for this visit.        ALLERGIES    Patient  has no known allergies.    Past Medical History:   Diagnosis Date   • Aortic aneurysm (CMS/HCC)    • Bilateral foot pain    • CAD, multiple vessel    • Carotid bruit    • Chest pain    • Claudication (CMS/HCC)    • Deviated septum    • Diminished pulses in lower extremity    • Edema    • Former smoker    • GERD (gastroesophageal reflux disease)    • Hyperlipidemia    • Hypertension    • Neuropathy    • Palpitations    • Pulmonary hypertension (CMS/HCC)    • TIA (transient ischemic attack)    • Tinnitus    • Vision impairment        Social History     Socioeconomic History   • Marital status:      Spouse name: Not on file   • Number of children: Not on file   • Years of education: Not on file   • Highest education level: Not on file   Tobacco Use   • Smoking status: Former Smoker   • Smokeless tobacco: Never Used   Substance and Sexual Activity   • Alcohol use: No   • Drug use: No   • Sexual activity: Defer       Family History   Problem Relation Age of Onset   • Other Mother         acute myocardial infarction   • Aneurysm Mother    • Goiter Mother    • Aneurysm Father    • Sudden death Father    • Heart failure Sister    • Other Other         leukemia   • Cancer Other         thyroid       Review of Systems   Constitutional: Positive for fatigue (always feels tired ). Negative for chills and fever.   HENT: Positive for congestion (seasonal ), rhinorrhea (runny nose ) and voice change (hoarseness ). Negative for sore throat.    Eyes: Positive for visual disturbance (glasses daily ).   Respiratory: Positive for shortness of breath (with minimal exertion; with CP ). Negative for chest tightness.    Cardiovascular: Positive for chest pain (rare achiness in chest midsternum, usually when active; does not rad; no nitro; less freq in the past year ), palpitations (Occasional flutters ) and leg swelling (Occasional edema which resolves with Lasix ).   Gastrointestinal: Negative for abdominal pain, blood in stool,  "nausea and vomiting.   Endocrine: Positive for cold intolerance (always feels cold ). Negative for heat intolerance.   Genitourinary: Negative for dysuria, frequency, hematuria and urgency.   Musculoskeletal: Positive for back pain (lower back pain; sciatica; scoliosis ) and gait problem (uses a straight cane, but still feels unsteady on her feet). Negative for arthralgias.   Skin: Positive for rash (toes of both feet ). Negative for wound.   Allergic/Immunologic: Negative for environmental allergies and food allergies.   Neurological: Positive for light-headedness (when standing too fast ). Negative for dizziness and weakness.   Hematological: Bruises/bleeds easily (bruises and bleeds easily ).   Psychiatric/Behavioral: Negative for sleep disturbance (denies waking with smothering or SOA).       Objective   /82 (BP Location: Left arm) Comment: manual  Pulse 57   Ht 170.2 cm (67\")   Wt 86.2 kg (190 lb)   SpO2 98%   BMI 29.76 kg/m²   Vitals:    04/29/19 1034 04/29/19 1059   BP: 169/78 130/82   BP Location: Left arm Left arm   Patient Position: Standing    Pulse: 57    SpO2: 98%    Weight: 86.2 kg (190 lb)    Height: 170.2 cm (67\")       Lab Results (most recent)     None        Physical Exam   Constitutional: She is oriented to person, place, and time. Vital signs are normal. She appears well-developed and well-nourished. She is active and cooperative.   HENT:   Head: Normocephalic.   Right Ear: Decreased hearing is noted.   Left Ear: Decreased hearing is noted.   Eyes: Lids are normal.   Wears glasses    Neck: Normal carotid pulses, no hepatojugular reflux and no JVD present. Carotid bruit is not present.   Cardiovascular: Regular rhythm and normal heart sounds. Bradycardia present.   Pulses:       Radial pulses are 2+ on the right side, and 2+ on the left side.        Dorsalis pedis pulses are 2+ on the right side, and 2+ on the left side.        Posterior tibial pulses are 2+ on the right side, and 2+ " on the left side.   No edema BLE.   Pulmonary/Chest: Effort normal. She has decreased breath sounds in the right lower field and the left lower field.   Abdominal: Normal appearance and bowel sounds are normal.   Neurological: She is alert and oriented to person, place, and time.   Skin: Skin is warm, dry and intact.   Psychiatric: She has a normal mood and affect. Her speech is normal and behavior is normal. Judgment and thought content normal. Cognition and memory are normal.       Procedure     ECG 12 Lead  Date/Time: 4/29/2019 12:03 PM  Performed by: Rekha Russo APRN  Authorized by: Rekha Russo APRN   Comparison: compared with previous ECG from 4/17/2018  Rhythm: sinus bradycardia  Rate: bradycardic  BPM: 55  Q waves: V3 and V4    QRS axis: normal  Other findings: low voltage    Clinical impression: non-specific ECG  Comments: QT/QTc 449/38                 Assessment/Plan      Diagnosis Plan   1. CAD, multiple vessel     2. Essential hypertension     3. Hyperlipidemia, unspecified hyperlipidemia type     4. Pulmonary hypertension (CMS/HCC)     5. Shortness of breath     6. Abdominal aortic aneurysm (AAA) without rupture (CMS/HCC)  US Abdomen Complete       Return in about 6 months (around 10/29/2019).  CAD-patient is on aspirin, Plavix, beta and statin.  Hypertension-patient doing well on beta-blocker and ARB.  Hyperlipidemia-patient is on Crestor monitored by PCP.  Pulmonary hypertension-last echo in 2016 showed PA only 20-25.  Shortness of breath-stable.  Chest pain-Per patient's  this has significantly improved on the Imdur and they do not want any further work-up at this time.  Abdominal aortic aneurysm-patient will have an abdominal ultrasound.  She will continue her medication regimen.  She was advised to use nitro as needed for chest pain no resolution to go to the ER.  She will follow-up in 6 months or sooner if any changes.       Patient's Body mass index is 29.76 kg/m². BMI is above  normal parameters. Recommendations include: educational material and referral to primary care.      Electronically signed by:

## 2019-05-07 ENCOUNTER — HOSPITAL ENCOUNTER (OUTPATIENT)
Dept: CARDIOLOGY | Facility: HOSPITAL | Age: 79
Discharge: HOME | End: 2019-05-07
Admitting: NURSE PRACTITIONER

## 2019-05-07 DIAGNOSIS — I71.40 ABDOMINAL AORTIC ANEURYSM (AAA) WITHOUT RUPTURE (HCC): ICD-10-CM

## 2019-05-07 PROCEDURE — 93979 VASCULAR STUDY: CPT | Performed by: INTERNAL MEDICINE

## 2019-05-07 PROCEDURE — 93979 VASCULAR STUDY: CPT

## 2019-05-14 LAB
ABDOMINAL DIST AORTA AP: 2.3 CM
ABDOMINAL DIST AORTA TRANS: 2.3 CM
ABDOMINAL DIST AORTA VEL: 93 CM/S
ABDOMINAL LT COM ILIAC AP: 0.9 CM
ABDOMINAL LT COM ILIAC TRANS: 0.8 CM
ABDOMINAL LT COM ILIAC VEL: 103 CM/S
ABDOMINAL MID AORTA AP: 2.1 CM
ABDOMINAL MID AORTA TRANS: 2 CM
ABDOMINAL MID AORTA VEL: 81 CM/S
ABDOMINAL PROX AORTA AP: 1.9 CM
ABDOMINAL PROX AORTA TRANS: 2 CM
ABDOMINAL PROX AORTA VEL: 87 CM/S
ABDOMINAL RT COM ILIAC AP: 0.8 CM
ABDOMINAL RT COM ILIAC TRANS: 0.8 CM
ABDOMINAL RT COM ILIAC VEL: 68 CM/S
BH CV ECHO MEAS - BSA(HAYCOCK): 2 M^2
BH CV ECHO MEAS - BSA: 2 M^2
BH CV ECHO MEAS - BZI_BMI: 29.8 KILOGRAMS/M^2
BH CV ECHO MEAS - BZI_METRIC_HEIGHT: 170.2 CM
BH CV ECHO MEAS - BZI_METRIC_WEIGHT: 86.2 KG
BH CV ECHO MEAS - DIST AO DIAM: 2.3 CM
BH CV VAS SMA 1ST PP TIME: 15 MIN
BH CV VAS SMA 2ND PP TIME: 30 MIN
BH CV VAS SMA 3RD PP TIME: 45 MIN

## 2019-05-15 ENCOUNTER — TELEPHONE (OUTPATIENT)
Dept: CARDIOLOGY | Facility: CLINIC | Age: 79
End: 2019-05-15

## 2019-05-15 NOTE — TELEPHONE ENCOUNTER
Duplex Abdominal Aorta & Iliac Artery Limited CAR     Details     Reading Physician Reading Date Result Priority   Silvio Collins MD 5/7/2019 Routine      Result Text     1.  No evidence of persistent abdominal aortic aneurysm with maximal AP diameter of approximately 2 cm.     2.  No evidence of significant aortic stenosis.  No findings to suggest extravascular flow in the region of prior repair.     3.  The iliac arteries are patent.

## 2019-09-25 ENCOUNTER — OFFICE VISIT (OUTPATIENT)
Dept: CARDIOLOGY | Facility: CLINIC | Age: 79
End: 2019-09-25

## 2019-09-25 VITALS
SYSTOLIC BLOOD PRESSURE: 124 MMHG | HEIGHT: 67 IN | OXYGEN SATURATION: 94 % | BODY MASS INDEX: 29.66 KG/M2 | WEIGHT: 189 LBS | HEART RATE: 65 BPM | DIASTOLIC BLOOD PRESSURE: 66 MMHG

## 2019-09-25 DIAGNOSIS — E78.5 HYPERLIPIDEMIA, UNSPECIFIED HYPERLIPIDEMIA TYPE: ICD-10-CM

## 2019-09-25 DIAGNOSIS — I10 ESSENTIAL HYPERTENSION: ICD-10-CM

## 2019-09-25 DIAGNOSIS — R06.02 SHORTNESS OF BREATH: ICD-10-CM

## 2019-09-25 DIAGNOSIS — R07.89 OTHER CHEST PAIN: Primary | ICD-10-CM

## 2019-09-25 DIAGNOSIS — I25.10 CAD, MULTIPLE VESSEL: ICD-10-CM

## 2019-09-25 PROCEDURE — 99214 OFFICE O/P EST MOD 30 MIN: CPT | Performed by: NURSE PRACTITIONER

## 2019-09-25 NOTE — PATIENT INSTRUCTIONS
"Fat and Cholesterol Restricted Eating Plan  Getting too much fat and cholesterol in your diet may cause health problems. Choosing the right foods helps keep your fat and cholesterol at normal levels. This can keep you from getting certain diseases.  Your doctor may recommend an eating plan that includes:  · Total fat: ______% or less of total calories a day.  · Saturated fat: ______% or less of total calories a day.  · Cholesterol: less than _________mg a day.  · Fiber: ______g a day.  What are tips for following this plan?  General tips    · Work with your doctor to lose weight if you need to.  · Avoid:  ? Foods with added sugar.  ? Fried foods.  ? Foods with partially hydrogenated oils.  · Limit alcohol intake to no more than 1 drink a day for nonpregnant women and 2 drinks a day for men. One drink equals 12 oz of beer, 5 oz of wine, or 1½ oz of hard liquor.  Reading food labels  · Check food labels for:  ? Trans fats.  ? Partially hydrogenated oils.  ? Saturated fat (g) in each serving.  ? Cholesterol (mg) in each serving.  ? Fiber (g) in each serving.  · Choose foods with healthy fats, such as:  ? Monounsaturated fats.  ? Polyunsaturated fats.  ? Omega-3 fats.  · Choose grain products that have whole grains. Look for the word \"whole\" as the first word in the ingredient list.  Cooking  · Cook foods using low-fat methods. These include baking, boiling, grilling, and broiling.  · Eat more home-cooked foods. Eat at restaurants and buffets less often.  · Avoid cooking using saturated fats, such as butter, cream, palm oil, palm kernel oil, and coconut oil.  Meal planning    · At meals, divide your plate into four equal parts:  ? Fill one-half of your plate with vegetables and green salads.  ? Fill one-fourth of your plate with whole grains.  ? Fill one-fourth of your plate with low-fat (lean) protein foods.  · Eat fish that is high in omega-3 fats at least two times a week. This includes mackerel, tuna, sardines, and " salmon.  · Eat foods that are high in fiber, such as whole grains, beans, apples, broccoli, carrots, peas, and barley.  Recommended foods  Grains  · Whole grains, such as whole wheat or whole grain breads, crackers, cereals, and pasta. Unsweetened oatmeal, bulgur, barley, quinoa, or brown rice. Corn or whole wheat flour tortillas.  Vegetables  · Fresh or frozen vegetables (raw, steamed, roasted, or grilled). Green salads.  Fruits  · All fresh, canned (in natural juice), or frozen fruits.  Meats and other protein foods  · Ground beef (85% or leaner), grass-fed beef, or beef trimmed of fat. Skinless chicken or turkey. Ground chicken or turkey. Pork trimmed of fat. All fish and seafood. Egg whites. Dried beans, peas, or lentils. Unsalted nuts or seeds. Unsalted canned beans. Nut butters without added sugar or oil.  Dairy  · Low-fat or nonfat dairy products, such as skim or 1% milk, 2% or reduced-fat cheeses, low-fat and fat-free ricotta or cottage cheese, or plain low-fat and nonfat yogurt.  Fats and oils  · Tub margarine without trans fats. Light or reduced-fat mayonnaise and salad dressings. Avocado. Olive, canola, sesame, or safflower oils.  The items listed above may not be a complete list of recommended foods or beverages. Contact your dietitian for more options.  The items listed above may not be a complete list of foods and beverages [you/your child] can eat. Contact a dietitian for more information.  Foods to avoid  Grains  · White bread. White pasta. White rice. Cornbread. Bagels, pastries, and croissants. Crackers and snack foods that contain trans fat and hydrogenated oils.  Vegetables  · Vegetables cooked in cheese, cream, or butter sauce. Fried vegetables.  Fruits  · Canned fruit in heavy syrup. Fruit in cream or butter sauce. Fried fruit.  Meats and other protein foods  · Fatty cuts of meat. Ribs, chicken wings, chino, sausage, bologna, salami, chitterlings, fatback, hot dogs, bratwurst, and packaged  lunch meats. Liver and organ meats. Whole eggs and egg yolks. Chicken and turkey with skin. Fried meat.  Dairy  · Whole or 2% milk, cream, half-and-half, and cream cheese. Whole milk cheeses. Whole-fat or sweetened yogurt. Full-fat cheeses. Nondairy creamers and whipped toppings. Processed cheese, cheese spreads, and cheese curds.  Beverages  · Alcohol. Sugar-sweetened drinks such as sodas, lemonade, and fruit drinks.  Fats and oils  · Butter, stick margarine, lard, shortening, ghee, or chino fat. Coconut, palm kernel, and palm oils.  Sweets and desserts  · Corn syrup, sugars, honey, and molasses. Candy. Jam and jelly. Syrup. Sweetened cereals. Cookies, pies, cakes, donuts, muffins, and ice cream.  The items listed above may not be a complete list of foods and beverages to avoid. Contact your dietitian for more information.  The items listed above may not be a complete list of foods and beverages [you/your child] should avoid. Contact a dietitian for more information.  Summary  · Choosing the right foods helps keep your fat and cholesterol at normal levels. This can keep you from getting certain diseases.  · At meals, fill one-half of your plate with vegetables and green salads.  · Eat high-fiber foods, like whole grains, beans, apples, carrots, peas, and barley.  · Limit added sugar, saturated fats, alcohol, and fried foods.  This information is not intended to replace advice given to you by your health care provider. Make sure you discuss any questions you have with your health care provider.  Document Released: 06/18/2013 Document Revised: 09/04/2018 Document Reviewed: 09/04/2018  [employer] Interactive Patient Education © 2019 [employer]  BMI for Adults    Body mass index (BMI) is a number that is calculated from a person's weight and height. BMI may help to estimate how much of a person's weight is composed of fat. BMI can help identify those who may be at higher risk for certain medical problems.  How is BMI  "used with adults?  BMI is used as a screening tool to identify possible weight problems. It is used to check whether a person is obese, overweight, healthy weight, or underweight.  How is BMI calculated?  BMI measures your weight and compares it to your height. This can be done either in English ([place] or metric measurements. Note that charts are available to help you find your BMI quickly and easily without having to do these calculations yourself.  To calculate your BMI in English ([place] measurements, your health care provider will:  1. Measure your weight in pounds (lb).  2. Multiply the number of pounds by 703.  ? For example, for a person who weighs 180 lb, multiply that number by 703, which equals 126,540.  3. Measure your height in inches (in). Then multiply that number by itself to get a measurement called \"inches squared.\"  ? For example, for a person who is 70 in tall, the \"inches squared\" measurement is 70 in x 70 in, which equals 4900 inches squared.  4. Divide the total from Step 2 (number of lb x 703) by the total from Step 3 (inches squared): 126,540 ÷ 4900 = 25.8. This is your BMI.  To calculate your BMI in metric measurements, your health care provider will:  1. Measure your weight in kilograms (kg).  2. Measure your height in meters (m). Then multiply that number by itself to get a measurement called \"meters squared.\"  ? For example, for a person who is 1.75 m tall, the \"meters squared\" measurement is 1.75 m x 1.75 m, which is equal to 3.1 meters squared.  3. Divide the number of kilograms (your weight) by the meters squared number. In this example: 70 ÷ 3.1 = 22.6. This is your BMI.  How is BMI interpreted?  To interpret your results, your health care provider will use BMI charts to identify whether you are underweight, normal weight, overweight, or obese. The following guidelines will be used:  · Underweight: BMI less than 18.5.  · Normal weight: BMI between 18.5 and 24.9.  · Overweight: BMI " between 25 and 29.9.  · Obese: BMI of 30 and above.  Please note:  · Weight includes both fat and muscle, so someone with a muscular build, such as an athlete, may have a BMI that is higher than 24.9. In cases like these, BMI is not an accurate measure of body fat.  · To determine if excess body fat is the cause of a BMI of 25 or higher, further assessments may need to be done by a health care provider.  · BMI is usually interpreted in the same way for men and women.  Why is BMI a useful tool?  BMI is useful in two ways:  · Identifying a weight problem that may be related to a medical condition, or that may increase the risk for medical problems.  · Promoting lifestyle and diet changes in order to reach a healthy weight.  Summary  · Body mass index (BMI) is a number that is calculated from a person's weight and height.  · BMI may help to estimate how much of a person's weight is composed of fat. BMI can help identify those who may be at higher risk for certain medical problems.  · BMI can be measured using English measurements or metric measurements.  · To interpret your results, your health care provider will use BMI charts to identify whether you are underweight, normal weight, overweight, or obese.  This information is not intended to replace advice given to you by your health care provider. Make sure you discuss any questions you have with your health care provider.  Document Released: 08/29/2005 Document Revised: 10/31/2018 Document Reviewed: 10/31/2018  [employer] Interactive Patient Education © 2019 [employer]    Nonspecific Chest Pain  Chest pain can be caused by many different conditions. It can be caused by something serious that needs treatment right away. This includes:  · Heart attack.  · A tear in the body's main blood vessel.  · Redness and swelling (inflammation) around your heart.  · Blood clot in your lungs.  It can be caused by something that is not as serious. This  includes:  · Heartburn.  · Anxiety or stress.  · Damage to bones or muscles in your chest.  · Lung infections.  See your doctor right away if you have chest pain. This is important.  Follow these instructions at home:  Medicines  · Take over-the-counter and prescription medicines only as told by your doctor.  · If you were prescribed an antibiotic medicine, take it as told by your doctor. Do not stop taking the antibiotic even if you start to feel better.  Lifestyle    · Rest as told by your doctor.  · Do not use any products that contain nicotine or tobacco, such as cigarettes and e-cigarettes. If you need help quitting, ask your doctor.  · Do not drink alcohol.  · Make lifestyle changes as told by your doctor. These may include:  ? Getting regular exercise. Ask your doctor what activities are safe for you.  ? Eating a heart-healthy diet. A diet and nutrition specialist (dietitian) can help you to learn healthy eating options.  ? Staying at a healthy weight.  ? Managing diabetes, if needed.  ? Lowering your stress. Activities such as yoga and relaxation techniques can help.  General instructions  · Avoid any activities that cause you to have chest pain.  · Keep all follow-up visits as told by your doctor. This is important. You may need more testing if your chest pain does not go away.  Contact a doctor if:  · Your chest pain does not go away.  · You feel depressed.  · You have a fever.  Get help right away if:  · Your chest pain is worse.  · You have a cough that gets worse, or you cough up blood.  · You have very bad (severe) pain in your belly (abdomen).  · You pass out (faint).  · You have either of these for no clear reason:  ? Sudden chest discomfort.  ? Sudden discomfort in your arms, back, neck, or jaw.  · You have shortness of breath at any time.  · You suddenly start to sweat, or your skin gets clammy.  · You feel sick to your stomach (nauseous).  · You throw up (vomit).  · You suddenly feel lightheaded  or dizzy.  · You feel very weak or tired.  · Your heart starts to beat fast, or it feels like it is skipping beats.  These symptoms may be an emergency. Do not wait to see if the symptoms will go away. Get medical help right away. Call your local emergency services (911 in the [place] Do not drive yourself to the hospital.  Summary  · Chest pain can be caused by many different conditions. The cause may be serious and need treatment right away. If you have chest pain, see your doctor right away.  · Follow your doctor's instructions for taking medicines and making lifestyle changes. Keep all follow-up visits as told by your doctor. This includes visits for any further testing if your chest pain does not go away.  · Know what signs mean you should get medical help right away.  This information is not intended to replace advice given to you by your health care provider. Make sure you discuss any questions you have with your health care provider.  Document Released: 06/05/2009 Document Revised: 02/08/2019 Document Reviewed: 02/08/2019  [employer] Interactive Patient Education © 2019 [employer].

## 2019-09-25 NOTE — PROGRESS NOTES
Subjective   Marissa Carey is a 79 y.o. female     Chief Complaint   Patient presents with   • Follow-up     Here for a 6 month follow up        HPI    Problem List:    1) Coronary Artery Disease   a. Cath in 2005 by Dr. Galloway revealing multivessel coronary artery disease s/p CABG by Dr. Soto with SHEN   to first diagonal, SVG to OM, SVG to PDA   b. Cath in 2012 revealing triple vessel disease with LIMA to LAD occluded with collaterals from RCA to LAD, with other grafts patents and medical management recommended    c. Stress Test at [facility]5-28-15 - no ischemia, low risk    d. Cath 7/23/15 - Stent RCA   e. Stress Test 10/24/16 - dense relatively fixed apical defect without an assoc wall motion abnormality most c/w attenuation; preserved post LVEF; indeterminate study like portending low risk   2) Hypertension  2.1) Echo 10/4/16 - mild LVH; EF 60-65%; DD II; early MAC, mild MR, TR and ND; PA 20-25   3) Dyslipidemia  4) Obesity BMI 37  5) Cerebrovascular disease   a. Hx of TIA with workup in 2008 which was negative  6) Peripheral Vascular disease    a. SHABBIR - < 20% MARIAN; < 50% LICA   b. SHABBIR 10/4/16 - MARIAN and LICA and bifurcation; antegrade flow   c. BLE arterial duplex at [facility]. 5-28-15 mild atherosclerotic change in BLE, no sign. plaque or stenoses  7) Osteoarthritis  8) GERD  9) Raynaud's disease.  10) Anxiety/Depression  11) Event Monitor 7/7-7/20/15 - SB - NSR with PVCs      12)  AAA w/repair 1997        A. Abdominal US 7/24/14 - 2.4 CM        B. Abdominal ultrasound 10/20/17-previous aneurysm repair, graft is patent, mid aorta measures up to 2.4 cm        C.  Abdominal ultrasound 5/7/19-no evidence of persistent abdominal aortic aneurysm with maximal AP diameter of 2 cm, no evidence of significant aortic stenosis, iliac arteries are patent    Patient is a 79-year-old female who presents today for follow-up with her  at her side.  She states that she does have episodes of what  she describes as chest ache/pressure midsternum when she is laying down.  She says it comes and goes.  She says she does also have it whenever she is overdoing it.  She says when this happens she will have to sit down.  She does not take nitroglycerin.  She says that she does get short of breath and just feels really fatigued when this happens.  She denies any palpitation, fluttering, dizziness, presyncope, syncope, orthopnea or PND.  She does get lightheaded if she stands too fast.  She does get swelling which typically resolves overnight.  She does have shortness of breath when she is doing more than normal.  She did advise me that she actually only does get if she has a cart to push with.  She says mostly because of her back.  But she does say that she stays fatigued.  All of female and her family have coronary artery issues.  She said her daughter had open heart surgery at 51.     Current Outpatient Medications   Medication Sig Dispense Refill   • Alpha-Lipoic Acid 600 MG capsule Take  by mouth Daily.     • aspirin 81 MG tablet Take 1 tablet by mouth daily.     • B Complex Vitamins (VITAMIN B COMPLEX) capsule capsule Take  by mouth Daily.     • carvedilol (COREG) 6.25 MG tablet Take 1 tablet by mouth 2 (Two) Times a Day. 60 tablet 5   • clopidogrel (PLAVIX) 75 MG tablet Take 1 tablet by mouth daily.     • Docusate Calcium (STOOL SOFTENER PO) Take 1 tablet by mouth daily.     • furosemide (LASIX) 20 MG tablet Take 20 mg by mouth Daily As Needed.     • HYDROcodone-acetaminophen (NORCO) 7.5-325 MG per tablet prn     • isosorbide mononitrate (IMDUR) 30 MG 24 hr tablet Take 30 mg by mouth Daily. Takes 1/2 tablet daily     • LINZESS 145 MCG capsule capsule 145 mcg As Needed.     • losartan (COZAAR) 25 MG tablet Take 1 tablet by mouth Daily. (Patient taking differently: Take 25 mg by mouth 2 (Two) Times a Day.) 30 tablet 0   • nitroglycerin (NITROSTAT) 0.4 MG SL tablet Place  under the tongue. Place 1 tablet under the  tongue every 5 minutes for up to doses as needed for chest pain . Call 911 if pain persists     • Omega-3 Fatty Acids (FISH OIL) 1200 MG capsule capsule Take  by mouth. With 360 mg omega 3 bid     • ranolazine (RANEXA) 500 MG 12 hr tablet Take 1 tablet by mouth every 12 (twelve) hours.     • rosuvastatin (CRESTOR) 20 MG tablet Take 1 tablet by mouth daily.     • sertraline (ZOLOFT) 50 MG tablet Take 50 mg by mouth Daily.       No current facility-administered medications for this visit.        ALLERGIES    Patient has no known allergies.    Past Medical History:   Diagnosis Date   • Aortic aneurysm (CMS/HCC)    • Bilateral foot pain    • CAD, multiple vessel    • Carotid bruit    • Chest pain    • Claudication (CMS/HCC)    • Deviated septum    • Diminished pulses in lower extremity    • Edema    • Former smoker    • GERD (gastroesophageal reflux disease)    • Hyperlipidemia    • Hypertension    • Neuropathy    • Palpitations    • Pulmonary hypertension (CMS/HCC)    • TIA (transient ischemic attack)    • Tinnitus    • Vision impairment        Social History     Socioeconomic History   • Marital status:      Spouse name: Not on file   • Number of children: Not on file   • Years of education: Not on file   • Highest education level: Not on file   Tobacco Use   • Smoking status: Former Smoker   • Smokeless tobacco: Never Used   Substance and Sexual Activity   • Alcohol use: No   • Drug use: No   • Sexual activity: Defer       Family History   Problem Relation Age of Onset   • Other Mother         acute myocardial infarction   • Aneurysm Mother    • Goiter Mother    • Aneurysm Father    • Sudden death Father    • Heart failure Sister    • Other Other         leukemia   • Cancer Other         thyroid       Review of Systems   Constitutional: Positive for fatigue (stays tired). Negative for chills and fever.   HENT: Positive for congestion, rhinorrhea and voice change (raspiness ). Negative for sore throat.    Eyes:  "Positive for visual disturbance (glasses daily ).   Respiratory: Positive for cough (Productive cough with clear sputum ), chest tightness (pressure when laying down ) and shortness of breath (with more than normal activity, if she uses a cart then she is fine ).    Cardiovascular: Positive for chest pain (some dull aches/pressure in chest when laying down at night; once in a while,  comes on and she goes to sleep and if she over does it; will have to sit down; no nitro) and leg swelling (LE edema at times which resolves overnight ). Negative for palpitations.   Gastrointestinal: Positive for blood in stool (with straining ) and constipation. Negative for abdominal pain, nausea and vomiting.   Endocrine: Negative for cold intolerance and heat intolerance.   Genitourinary: Negative for dysuria, frequency, hematuria and urgency.   Musculoskeletal: Positive for arthralgias (joints ), back pain (lower back pain ) and gait problem (uses a straight cane ).   Skin: Negative.  Negative for rash and wound.   Allergic/Immunologic: Positive for environmental allergies (seasonal ). Negative for food allergies.   Neurological: Positive for light-headedness (when standing too fast ). Negative for dizziness, syncope and weakness.   Hematological: Bruises/bleeds easily (bruises and bleeds easily ).   Psychiatric/Behavioral: Negative.  Negative for sleep disturbance (denies waking with smothering ).       Objective   /66 (BP Location: Left arm, Patient Position: Sitting)   Pulse 65   Ht 170.2 cm (67\")   Wt 85.7 kg (189 lb)   SpO2 94%   BMI 29.60 kg/m²   Vitals:    09/25/19 1129   BP: 124/66   BP Location: Left arm   Patient Position: Sitting   Pulse: 65   SpO2: 94%   Weight: 85.7 kg (189 lb)   Height: 170.2 cm (67\")      Lab Results (most recent)     None        Physical Exam   Constitutional: She is oriented to person, place, and time. Vital signs are normal. She appears well-developed and well-nourished. She is active " and cooperative.   HENT:   Head: Normocephalic.   Eyes: Lids are normal.   Wears glasses    Neck: Normal carotid pulses, no hepatojugular reflux and no JVD present. Carotid bruit is not present.   Cardiovascular: Normal rate, regular rhythm and normal heart sounds.   Pulses:       Radial pulses are 2+ on the right side, and 2+ on the left side.        Dorsalis pedis pulses are 2+ on the right side, and 2+ on the left side.        Posterior tibial pulses are 2+ on the right side, and 2+ on the left side.   No edema BLE.   Pulmonary/Chest: Effort normal and breath sounds normal.   Abdominal: Normal appearance and bowel sounds are normal.   Musculoskeletal:   Uses a cane    Neurological: She is alert and oriented to person, place, and time.   Skin: Skin is warm, dry and intact. Bruising noted.   Psychiatric: She has a normal mood and affect. Her speech is normal and behavior is normal. Judgment and thought content normal. Cognition and memory are normal.       Procedure   Procedures         Assessment/Plan      Diagnosis Plan   1. Other chest pain  Stress Test With Myocardial Perfusion One Day    Adult Transthoracic Echo Complete W/ Cont if Necessary Per Protocol   2. CAD, multiple vessel  Stress Test With Myocardial Perfusion One Day    Adult Transthoracic Echo Complete W/ Cont if Necessary Per Protocol   3. Hyperlipidemia, unspecified hyperlipidemia type  Stress Test With Myocardial Perfusion One Day    Adult Transthoracic Echo Complete W/ Cont if Necessary Per Protocol   4. Essential hypertension  Stress Test With Myocardial Perfusion One Day    Adult Transthoracic Echo Complete W/ Cont if Necessary Per Protocol   5. Shortness of breath  Stress Test With Myocardial Perfusion One Day    Adult Transthoracic Echo Complete W/ Cont if Necessary Per Protocol       Return in about 6 months (around 3/25/2020).  Chest pain/CAD/hyperlipidemia/hypertension/shortness of breath-patient have ischemia work-up, stress and echo.  We  will try to get this done next week as she leaves for [place] for the winter.  Patient is on isosorbide.  She was advised to use nitro PRN for chest pain no resolution to go to the ER.  She will continue her medication regimen otherwise.  She will follow-up 6 months or sooner based on her testing.         Patient's Body mass index is 29.6 kg/m². BMI is above normal parameters. Recommendations include: educational material and referral to primary care.      Electronically signed by:

## 2019-10-28 ENCOUNTER — HOSPITAL ENCOUNTER (OUTPATIENT)
Dept: CARDIOLOGY | Facility: HOSPITAL | Age: 79
Discharge: HOME | End: 2019-10-28

## 2019-10-28 VITALS — BODY MASS INDEX: 29.65 KG/M2 | HEIGHT: 67 IN | WEIGHT: 188.93 LBS

## 2019-10-28 DIAGNOSIS — I10 ESSENTIAL HYPERTENSION: ICD-10-CM

## 2019-10-28 DIAGNOSIS — R06.02 SHORTNESS OF BREATH: ICD-10-CM

## 2019-10-28 DIAGNOSIS — I25.10 CAD, MULTIPLE VESSEL: ICD-10-CM

## 2019-10-28 DIAGNOSIS — E78.5 HYPERLIPIDEMIA, UNSPECIFIED HYPERLIPIDEMIA TYPE: ICD-10-CM

## 2019-10-28 DIAGNOSIS — R07.89 OTHER CHEST PAIN: ICD-10-CM

## 2019-10-28 PROCEDURE — 0 TECHNETIUM SESTAMIBI: Performed by: INTERNAL MEDICINE

## 2019-10-28 PROCEDURE — A9500 TC99M SESTAMIBI: HCPCS | Performed by: INTERNAL MEDICINE

## 2019-10-28 PROCEDURE — 25010000002 REGADENOSON 0.4 MG/5ML SOLUTION: Performed by: INTERNAL MEDICINE

## 2019-10-28 PROCEDURE — 93016 CV STRESS TEST SUPVJ ONLY: CPT | Performed by: NURSE PRACTITIONER

## 2019-10-28 PROCEDURE — 93018 CV STRESS TEST I&R ONLY: CPT | Performed by: INTERNAL MEDICINE

## 2019-10-28 PROCEDURE — 93306 TTE W/DOPPLER COMPLETE: CPT | Performed by: INTERNAL MEDICINE

## 2019-10-28 PROCEDURE — 93017 CV STRESS TEST TRACING ONLY: CPT

## 2019-10-28 PROCEDURE — 78452 HT MUSCLE IMAGE SPECT MULT: CPT | Performed by: INTERNAL MEDICINE

## 2019-10-28 PROCEDURE — 78452 HT MUSCLE IMAGE SPECT MULT: CPT

## 2019-10-28 PROCEDURE — 93306 TTE W/DOPPLER COMPLETE: CPT

## 2019-10-28 RX ADMIN — TECHNETIUM TC 99M SESTAMIBI 1 DOSE: 1 INJECTION INTRAVENOUS at 08:23

## 2019-10-28 RX ADMIN — REGADENOSON 0.4 MG: 0.08 INJECTION, SOLUTION INTRAVENOUS at 08:24

## 2019-10-28 RX ADMIN — TECHNETIUM TC 99M SESTAMIBI 1 DOSE: 1 INJECTION INTRAVENOUS at 08:24

## 2019-10-29 ENCOUNTER — TELEPHONE (OUTPATIENT)
Dept: CARDIOLOGY | Facility: CLINIC | Age: 79
End: 2019-10-29

## 2019-10-29 LAB
AORTIC DIMENSIONLESS INDEX: 0.7 (DI)
BH CV ECHO MEAS - ACS: 1.6 CM
BH CV ECHO MEAS - AO MAX PG (FULL): 2.7 MMHG
BH CV ECHO MEAS - AO MAX PG: 5.1 MMHG
BH CV ECHO MEAS - AO MEAN PG (FULL): 1 MMHG
BH CV ECHO MEAS - AO MEAN PG: 2 MMHG
BH CV ECHO MEAS - AO ROOT AREA (BSA CORRECTED): 1.7
BH CV ECHO MEAS - AO ROOT AREA: 9.1 CM^2
BH CV ECHO MEAS - AO ROOT DIAM: 3.4 CM
BH CV ECHO MEAS - AO V2 MAX: 113 CM/SEC
BH CV ECHO MEAS - AO V2 MEAN: 68.3 CM/SEC
BH CV ECHO MEAS - AO V2 VTI: 27.3 CM
BH CV ECHO MEAS - BSA(HAYCOCK): 2 M^2
BH CV ECHO MEAS - BSA: 2 M^2
BH CV ECHO MEAS - BZI_BMI: 29.6 KILOGRAMS/M^2
BH CV ECHO MEAS - BZI_METRIC_HEIGHT: 170.2 CM
BH CV ECHO MEAS - BZI_METRIC_WEIGHT: 85.7 KG
BH CV ECHO MEAS - EDV(CUBED): 125 ML
BH CV ECHO MEAS - EDV(TEICH): 118.2 ML
BH CV ECHO MEAS - EF(CUBED): 83 %
BH CV ECHO MEAS - EF(TEICH): 75.7 %
BH CV ECHO MEAS - ESV(CUBED): 21.3 ML
BH CV ECHO MEAS - ESV(TEICH): 28.8 ML
BH CV ECHO MEAS - FS: 44.6 %
BH CV ECHO MEAS - IVS/LVPW: 0.8
BH CV ECHO MEAS - IVSD: 0.89 CM
BH CV ECHO MEAS - LA DIMENSION(2D): 4 CM
BH CV ECHO MEAS - LA DIMENSION: 3.8 CM
BH CV ECHO MEAS - LA/AO: 1.1
BH CV ECHO MEAS - LV IVRT: 0.15 SEC
BH CV ECHO MEAS - LV MASS(C)D: 182 GRAMS
BH CV ECHO MEAS - LV MASS(C)DI: 92.2 GRAMS/M^2
BH CV ECHO MEAS - LV MAX PG: 2.4 MMHG
BH CV ECHO MEAS - LV MEAN PG: 1 MMHG
BH CV ECHO MEAS - LV V1 MAX: 77 CM/SEC
BH CV ECHO MEAS - LV V1 MEAN: 41.9 CM/SEC
BH CV ECHO MEAS - LV V1 VTI: 19.7 CM
BH CV ECHO MEAS - LVIDD: 5 CM
BH CV ECHO MEAS - LVIDS: 2.8 CM
BH CV ECHO MEAS - LVPWD: 1.1 CM
BH CV ECHO MEAS - MR MAX PG: 25.8 MMHG
BH CV ECHO MEAS - MR MAX VEL: 254 CM/SEC
BH CV ECHO MEAS - MV A MAX VEL: 76.8 CM/SEC
BH CV ECHO MEAS - MV DEC SLOPE: 431 CM/SEC^2
BH CV ECHO MEAS - MV DEC TIME: 0.2 SEC
BH CV ECHO MEAS - MV E MAX VEL: 97.3 CM/SEC
BH CV ECHO MEAS - MV E/A: 1.3
BH CV ECHO MEAS - MV MAX PG: 5.1 MMHG
BH CV ECHO MEAS - MV MEAN PG: 1 MMHG
BH CV ECHO MEAS - MV P1/2T MAX VEL: 109 CM/SEC
BH CV ECHO MEAS - MV P1/2T: 74.1 MSEC
BH CV ECHO MEAS - MV V2 MAX: 113 CM/SEC
BH CV ECHO MEAS - MV V2 MEAN: 53.3 CM/SEC
BH CV ECHO MEAS - MV V2 VTI: 38.3 CM
BH CV ECHO MEAS - MVA P1/2T LCG: 2 CM^2
BH CV ECHO MEAS - MVA(P1/2T): 3 CM^2
BH CV ECHO MEAS - PA MAX PG (FULL): 0.35 MMHG
BH CV ECHO MEAS - PA MAX PG: 2.1 MMHG
BH CV ECHO MEAS - PA MEAN PG (FULL): 0 MMHG
BH CV ECHO MEAS - PA MEAN PG: 1 MMHG
BH CV ECHO MEAS - PA V2 MAX: 71.7 CM/SEC
BH CV ECHO MEAS - PA V2 MEAN: 49 CM/SEC
BH CV ECHO MEAS - PA V2 VTI: 17.7 CM
BH CV ECHO MEAS - PI END-D VEL: 74.4 CM/SEC
BH CV ECHO MEAS - RAP SYSTOLE: 10 MMHG
BH CV ECHO MEAS - RV MAX PG: 1.7 MMHG
BH CV ECHO MEAS - RV MEAN PG: 1 MMHG
BH CV ECHO MEAS - RV V1 MAX: 65.3 CM/SEC
BH CV ECHO MEAS - RV V1 MEAN: 44 CM/SEC
BH CV ECHO MEAS - RV V1 VTI: 17.4 CM
BH CV ECHO MEAS - RVDD: 2.9 CM
BH CV ECHO MEAS - RVSP: 43 MMHG
BH CV ECHO MEAS - SI(AO): 125.6 ML/M^2
BH CV ECHO MEAS - SI(CUBED): 52.6 ML/M^2
BH CV ECHO MEAS - SI(TEICH): 45.3 ML/M^2
BH CV ECHO MEAS - SV(AO): 247.9 ML
BH CV ECHO MEAS - SV(CUBED): 103.7 ML
BH CV ECHO MEAS - SV(TEICH): 89.5 ML
BH CV ECHO MEAS - TR MAX VEL: 285 CM/SEC
BH CV STRESS COMMENTS STAGE 1: NORMAL
BH CV STRESS DOSE REGADENOSON STAGE 1: 0.4
BH CV STRESS DURATION MIN STAGE 1: 0
BH CV STRESS DURATION SEC STAGE 1: 10
BH CV STRESS PROTOCOL 1: NORMAL
BH CV STRESS RECOVERY BP: NORMAL MMHG
BH CV STRESS RECOVERY HR: 63 BPM
BH CV STRESS STAGE 1: 1
MAXIMAL PREDICTED HEART RATE: 142 BPM
MAXIMAL PREDICTED HEART RATE: 142 BPM
PERCENT MAX PREDICTED HR: 48.59 %
STRESS BASELINE BP: NORMAL MMHG
STRESS BASELINE HR: 54 BPM
STRESS PERCENT HR: 57 %
STRESS POST PEAK BP: NORMAL MMHG
STRESS POST PEAK HR: 69 BPM
STRESS TARGET HR: 121 BPM
STRESS TARGET HR: 121 BPM

## 2019-10-29 NOTE — TELEPHONE ENCOUNTER
Stress:  1.  Lateral wall ischemia by scintigraphy.     2.  Preserved post stress ejection fraction of 66% with no focal wall motion abnormalities.     3.  No evidence of pharmacologically-induced ischemic dilation or increased lung uptake of radiopharmaceutical.      Per Rekha, needs to be seen within the next week.   Can come in tomorrow at 8:30am.         Informed pt of the above message and results, she stated she will be in at 8:30am tomorrow.

## 2019-10-30 ENCOUNTER — OFFICE VISIT (OUTPATIENT)
Dept: CARDIOLOGY | Facility: CLINIC | Age: 79
End: 2019-10-30

## 2019-10-30 VITALS
SYSTOLIC BLOOD PRESSURE: 133 MMHG | WEIGHT: 196 LBS | DIASTOLIC BLOOD PRESSURE: 77 MMHG | BODY MASS INDEX: 30.76 KG/M2 | OXYGEN SATURATION: 94 % | HEIGHT: 67 IN | HEART RATE: 69 BPM

## 2019-10-30 DIAGNOSIS — Z00.00 HEALTHCARE MAINTENANCE: ICD-10-CM

## 2019-10-30 DIAGNOSIS — R06.02 SHORTNESS OF BREATH: ICD-10-CM

## 2019-10-30 DIAGNOSIS — I27.20 PULMONARY HYPERTENSION (HCC): ICD-10-CM

## 2019-10-30 DIAGNOSIS — R07.2 PRECORDIAL PAIN: ICD-10-CM

## 2019-10-30 DIAGNOSIS — I10 ESSENTIAL HYPERTENSION: ICD-10-CM

## 2019-10-30 DIAGNOSIS — R94.39 ABNORMAL STRESS TEST: ICD-10-CM

## 2019-10-30 DIAGNOSIS — I25.10 CAD, MULTIPLE VESSEL: Primary | ICD-10-CM

## 2019-10-30 DIAGNOSIS — E78.5 HYPERLIPIDEMIA, UNSPECIFIED HYPERLIPIDEMIA TYPE: ICD-10-CM

## 2019-10-30 PROBLEM — R00.2 PALPITATIONS: Status: ACTIVE | Noted: 2019-10-30

## 2019-10-30 PROCEDURE — 99214 OFFICE O/P EST MOD 30 MIN: CPT | Performed by: NURSE PRACTITIONER

## 2019-10-30 NOTE — PROGRESS NOTES
Subjective   Marissa Carey is a 79 y.o. female     Chief Complaint   Patient presents with   • Follow-up     Here for a follow up on testing        HPI    Problem List:    1) Coronary Artery Disease   a. Cath in 2005 by Dr. Galloway revealing multivessel coronary artery disease s/p CABG by Dr. Soto with SHEN   to first diagonal, SVG to OM, SVG to PDA   b. Cath in 2012 revealing triple vessel disease with LIMA to LAD occluded with collaterals from RCA to LAD, with other grafts patents and medical management recommended    c. Stress Test at [facility]5-28-15 - no ischemia, low risk    d. Cath 7/23/15 - Stent RCA   e. Stress Test 10/24/16 - dense relatively fixed apical defect without an assoc wall motion abnormality most c/w attenuation; preserved post LVEF; indeterminate study like portending low risk   F.  Stress test 10/28/19-lateral wall ischemia, post-rest EF 66%  2) Hypertension  2.1) Echo 10/4/16 - mild LVH; EF 60-65%; DD II; early MAC, mild MR, TR and ID; PA 20-25   2.2) echo 10/28/19-borderline LVH, diastolic dysfunction 2, trivial to mild MR, mild TR, PA in the 40s, EF 66 to 70%  3) Dyslipidemia  4) Obesity BMI 37  5) Cerebrovascular disease   a. Hx of TIA with workup in 2008 which was negative  6) Peripheral Vascular disease    a. SHABBIR - < 20% MARIAN; < 50% LICA   b. SHABBIR 10/4/16 - 16-49% MARIAN and LICA and bifurcation; antegrade flow   c. BLE arterial duplex at [facility] [place] [facility]. 5-28-15 mild atherosclerotic change in BLE, no sign. plaque or stenoses  7) Osteoarthritis  8) GERD  9) Raynaud's disease.  10) Anxiety/Depression  11) Event Monitor 7/7-7/20/15 - SB - NSR with PVCs      12)  AAA w/repair 1997        A. Abdominal US 7/24/14 - 2.4 CM        B. Abdominal ultrasound 10/20/17-previous aneurysm repair, graft is patent, mid aorta measures up to 2.4 cm        C.  Abdominal ultrasound 5/7/19-no evidence of persistent abdominal aortic aneurysm with maximal AP diameter of 2 cm, no evidence of  significant aortic stenosis, iliac arteries are patent    Patient is a 79-year-old female who presents today for follow-up and testing with her  at her side.  She says she does continue to have what she describes as a heaviness and squeezing discomfort mostly when she first lays down at night.  She says at least that is what is much more pronounced.  She then did say that she gets tired when she walks from one in house to the other in her chest will start hurting her.  She says she will have that tightness and she will have to bend over.  She says she has shortness of breath pretty much most of the time with any activity.  She denies any palpitations, fluttering, dizziness, presyncope, syncope, orthopnea, PND or edema.  She says she does get lightheaded if she stands fast or moves quickly.  Again she is short of breath with minimal activity as well as fatigue.    Patient says she was tested for sleep apnea in the past and she was borderline but I think it was partially related to some of the medication she was on.  She was using oxygen at the time but she did not feel like she needed it so she took it back.    We went over stress and echo.  I do not have the cath report from 2015 and unfortunately unable to put up the hospital right now because they are having computer issues.    Current Outpatient Medications on File Prior to Visit   Medication Sig Dispense Refill   • Alpha-Lipoic Acid 600 MG capsule Take  by mouth Daily.     • aspirin 81 MG tablet Take 1 tablet by mouth daily.     • B Complex Vitamins (VITAMIN B COMPLEX) capsule capsule Take  by mouth Daily.     • carvedilol (COREG) 6.25 MG tablet Take 1 tablet by mouth 2 (Two) Times a Day. 60 tablet 5   • clopidogrel (PLAVIX) 75 MG tablet Take 1 tablet by mouth daily.     • Docusate Calcium (STOOL SOFTENER PO) Take 1 tablet by mouth daily.     • furosemide (LASIX) 20 MG tablet Take 20 mg by mouth Daily As Needed.     • HYDROcodone-acetaminophen (NORCO)  7.5-325 MG per tablet prn     • isosorbide mononitrate (IMDUR) 30 MG 24 hr tablet Take 30 mg by mouth Daily. Takes 1/2 tablet daily     • LINZESS 145 MCG capsule capsule 145 mcg As Needed.     • losartan (COZAAR) 25 MG tablet Take 1 tablet by mouth Daily. 30 tablet 0   • nitroglycerin (NITROSTAT) 0.4 MG SL tablet Place  under the tongue. Place 1 tablet under the tongue every 5 minutes for up to doses as needed for chest pain . Call 911 if pain persists     • Omega-3 Fatty Acids (FISH OIL) 1200 MG capsule capsule Take  by mouth. With 360 mg omega 3 bid     • ranolazine (RANEXA) 500 MG 12 hr tablet Take 1 tablet by mouth every 12 (twelve) hours.     • rosuvastatin (CRESTOR) 20 MG tablet Take 1 tablet by mouth daily.     • sertraline (ZOLOFT) 50 MG tablet Take 50 mg by mouth Daily.       No current facility-administered medications on file prior to visit.        ALLERGIES    Patient has no known allergies.    Past Medical History:   Diagnosis Date   • Aortic aneurysm (CMS/HCC)    • Bilateral foot pain    • CAD, multiple vessel    • Carotid bruit    • Chest pain    • Claudication (CMS/HCC)    • Deviated septum    • Diminished pulses in lower extremity    • Edema    • Former smoker    • GERD (gastroesophageal reflux disease)    • Hyperlipidemia    • Hypertension    • Neuropathy    • Palpitations    • Pulmonary hypertension (CMS/HCC)    • TIA (transient ischemic attack)    • Tinnitus    • Vision impairment        Social History     Socioeconomic History   • Marital status:      Spouse name: Not on file   • Number of children: Not on file   • Years of education: Not on file   • Highest education level: Not on file   Tobacco Use   • Smoking status: Former Smoker   • Smokeless tobacco: Never Used   Substance and Sexual Activity   • Alcohol use: No   • Drug use: No   • Sexual activity: Defer       Family History   Problem Relation Age of Onset   • Other Mother         acute myocardial infarction   • Aneurysm Mother   "  • Goiter Mother    • Aneurysm Father    • Sudden death Father    • Heart failure Sister    • Other Other         leukemia   • Cancer Other         thyroid       Review of Systems   Constitutional: Positive for fatigue (stays tired; just walking from one end of the house to the other her chest is hurting and she is bending over). Negative for chills and fever.   HENT: Positive for congestion and rhinorrhea. Negative for sore throat.    Eyes: Positive for visual disturbance (glasses daily ).   Respiratory: Positive for chest tightness (some heaviness at times ) and shortness of breath (some soa with exertion such as walking; all of the time ).    Cardiovascular: Positive for chest pain (discomfort squeezing/pressure in chest mostly when laying down at night; much more pronounced; expect it ever night; no nitro  ). Negative for palpitations and leg swelling.   Gastrointestinal: Positive for constipation. Negative for abdominal pain, blood in stool, nausea and vomiting.   Endocrine: Positive for cold intolerance (stays cold, especially in hands and feet ). Negative for heat intolerance.   Genitourinary: Positive for frequency. Negative for dysuria, hematuria and urgency.   Musculoskeletal: Positive for arthralgias (joints ), back pain (low back pain ) and gait problem (uses a cane).   Skin: Negative.  Negative for rash and wound.   Allergic/Immunologic: Negative for environmental allergies and food allergies.   Neurological: Positive for light-headedness (when standing fast or moving quickly ). Negative for dizziness and weakness.   Hematological: Bruises/bleeds easily (bruises easily ).   Psychiatric/Behavioral: Negative for sleep disturbance (denies waking with smothering ).       Objective   /77 (BP Location: Left arm, Patient Position: Sitting)   Pulse 69   Ht 170.2 cm (67\")   Wt 88.9 kg (196 lb)   SpO2 94%   BMI 30.70 kg/m²   Vitals:    10/30/19 0811   BP: 133/77   BP Location: Left arm   Patient " "Position: Sitting   Pulse: 69   SpO2: 94%   Weight: 88.9 kg (196 lb)   Height: 170.2 cm (67\")      Lab Results (most recent)     None        Physical Exam   Constitutional: She is oriented to person, place, and time. Vital signs are normal. She appears well-developed and well-nourished. She is active and cooperative.   HENT:   Head: Normocephalic.   Right Ear: Decreased hearing is noted.   Left Ear: Decreased hearing is noted.   Eyes: Lids are normal.   Wears glasses    Neck: Normal carotid pulses, no hepatojugular reflux and no JVD present. Carotid bruit is not present.   Cardiovascular: Normal rate, regular rhythm and normal heart sounds.   Pulses:       Radial pulses are 2+ on the right side, and 2+ on the left side.        Dorsalis pedis pulses are 2+ on the right side, and 2+ on the left side.        Posterior tibial pulses are 2+ on the right side, and 2+ on the left side.   No edema BLE.   Pulmonary/Chest: Effort normal and breath sounds normal.   Abdominal: Normal appearance and bowel sounds are normal.   Musculoskeletal:   Uses a cane    Neurological: She is alert and oriented to person, place, and time.   Skin: Skin is warm, dry and intact.   Psychiatric: She has a normal mood and affect. Her speech is normal and behavior is normal. Judgment and thought content normal. Cognition and memory are normal.       Procedure   Procedures         Assessment/Plan      Diagnosis Plan   1. CAD, multiple vessel  [facility]    CBC (No Diff)    Basic Metabolic Panel   2. Essential hypertension  [facility]    CBC (No Diff)   3. Hyperlipidemia, unspecified hyperlipidemia type  [facility]   4. Shortness of breath  [facility]    Ambulatory Referral to Pulmonology   5. Abnormal stress test  [facility]   6. Healthcare maintenance  CBC (No Diff)    Basic Metabolic Panel   7. Precordial pain  [facility]    Basic Metabolic Panel   8. Pulmonary hypertension (CMS/HCC)  " Ambulatory Referral to Pulmonology       Return 1-4 weeks after LHC .    CAD/hypertension/hyperlipidemia/shortness of breath/abnormal stress test/chest pain-patient will proceed with left heart cath.  She is already on Plavix and aspirin.  She will use nitro PRN for chest pain no resolution she will go to the ER.  She is on isosorbide and Ranexa.  Pulmonary hypertension-she will be referred to Dr. Moreno.  It is possible she does have sleep apnea at this time therefore I will refer her especially because her pulmonary pressures are slightly elevated for evaluation.  She will continue her medication regimen.  She will follow-up 1 to 4 weeks after heart catheter sooner if any changes.  She will get a CBC and BMP prior to heart cath.       Patient's Body mass index is 30.7 kg/m². BMI is above normal parameters. Recommendations include: educational material and referral to primary care.      Electronically signed by:

## 2019-10-30 NOTE — PATIENT INSTRUCTIONS
"Fat and Cholesterol Restricted Eating Plan  Getting too much fat and cholesterol in your diet may cause health problems. Choosing the right foods helps keep your fat and cholesterol at normal levels. This can keep you from getting certain diseases.  Your doctor may recommend an eating plan that includes:  · Total fat: ______% or less of total calories a day.  · Saturated fat: ______% or less of total calories a day.  · Cholesterol: less than _________mg a day.  · Fiber: ______g a day.  What are tips for following this plan?  Meal planning  · At meals, divide your plate into four equal parts:  ? Fill one-half of your plate with vegetables and green salads.  ? Fill one-fourth of your plate with whole grains.  ? Fill one-fourth of your plate with low-fat (lean) protein foods.  · Eat fish that is high in omega-3 fats at least two times a week. This includes mackerel, tuna, sardines, and salmon.  · Eat foods that are high in fiber, such as whole grains, beans, apples, broccoli, carrots, peas, and barley.  General tips    · Work with your doctor to lose weight if you need to.  · Avoid:  ? Foods with added sugar.  ? Fried foods.  ? Foods with partially hydrogenated oils.  · Limit alcohol intake to no more than 1 drink a day for nonpregnant women and 2 drinks a day for men. One drink equals 12 oz of beer, 5 oz of wine, or 1½ oz of hard liquor.  Reading food labels  · Check food labels for:  ? Trans fats.  ? Partially hydrogenated oils.  ? Saturated fat (g) in each serving.  ? Cholesterol (mg) in each serving.  ? Fiber (g) in each serving.  · Choose foods with healthy fats, such as:  ? Monounsaturated fats.  ? Polyunsaturated fats.  ? Omega-3 fats.  · Choose grain products that have whole grains. Look for the word \"whole\" as the first word in the ingredient list.  Cooking  · Cook foods using low-fat methods. These include baking, boiling, grilling, and broiling.  · Eat more home-cooked foods. Eat at restaurants and buffets " less often.  · Avoid cooking using saturated fats, such as butter, cream, palm oil, palm kernel oil, and coconut oil.  Recommended foods    Fruits  · All fresh, canned (in natural juice), or frozen fruits.  Vegetables  · Fresh or frozen vegetables (raw, steamed, roasted, or grilled). Green salads.  Grains  · Whole grains, such as whole wheat or whole grain breads, crackers, cereals, and pasta. Unsweetened oatmeal, bulgur, barley, quinoa, or brown rice. Corn or whole wheat flour tortillas.  Meats and other protein foods  · Ground beef (85% or leaner), grass-fed beef, or beef trimmed of fat. Skinless chicken or turkey. Ground chicken or turkey. Pork trimmed of fat. All fish and seafood. Egg whites. Dried beans, peas, or lentils. Unsalted nuts or seeds. Unsalted canned beans. Nut butters without added sugar or oil.  Dairy  · Low-fat or nonfat dairy products, such as skim or 1% milk, 2% or reduced-fat cheeses, low-fat and fat-free ricotta or cottage cheese, or plain low-fat and nonfat yogurt.  Fats and oils  · Tub margarine without trans fats. Light or reduced-fat mayonnaise and salad dressings. Avocado. Olive, canola, sesame, or safflower oils.  The items listed above may not be a complete list of foods and beverages you can eat. Contact a dietitian for more information.  Foods to avoid  Fruits  · Canned fruit in heavy syrup. Fruit in cream or butter sauce. Fried fruit.  Vegetables  · Vegetables cooked in cheese, cream, or butter sauce. Fried vegetables.  Grains  · White bread. White pasta. White rice. Cornbread. Bagels, pastries, and croissants. Crackers and snack foods that contain trans fat and hydrogenated oils.  Meats and other protein foods  · Fatty cuts of meat. Ribs, chicken wings, chino, sausage, bologna, salami, chitterlings, fatback, hot dogs, bratwurst, and packaged lunch meats. Liver and organ meats. Whole eggs and egg yolks. Chicken and turkey with skin. Fried meat.  Dairy  · Whole or 2% milk, cream,  half-and-half, and cream cheese. Whole milk cheeses. Whole-fat or sweetened yogurt. Full-fat cheeses. Nondairy creamers and whipped toppings. Processed cheese, cheese spreads, and cheese curds.  Beverages  · Alcohol. Sugar-sweetened drinks such as sodas, lemonade, and fruit drinks.  Fats and oils  · Butter, stick margarine, lard, shortening, ghee, or chino fat. Coconut, palm kernel, and palm oils.  Sweets and desserts  · Corn syrup, sugars, honey, and molasses. Candy. Jam and jelly. Syrup. Sweetened cereals. Cookies, pies, cakes, donuts, muffins, and ice cream.  The items listed above may not be a complete list of foods and beverages you should avoid. Contact a dietitian for more information.  Summary  · Choosing the right foods helps keep your fat and cholesterol at normal levels. This can keep you from getting certain diseases.  · At meals, fill one-half of your plate with vegetables and green salads.  · Eat high-fiber foods, like whole grains, beans, apples, carrots, peas, and barley.  · Limit added sugar, saturated fats, alcohol, and fried foods.  This information is not intended to replace advice given to you by your health care provider. Make sure you discuss any questions you have with your health care provider.  Document Released: 06/18/2013 Document Revised: 08/21/2019 Document Reviewed: 09/04/2018  [employer] Interactive Patient Education © 2019 [employer]  BMI for Adults    Body mass index (BMI) is a number that is calculated from a person's weight and height. BMI may help to estimate how much of a person's weight is composed of fat. BMI can help identify those who may be at higher risk for certain medical problems.  How is BMI used with adults?  BMI is used as a screening tool to identify possible weight problems. It is used to check whether a person is obese, overweight, healthy weight, or underweight.  How is BMI calculated?  BMI measures your weight and compares it to your height. This can be done  "either in English ([place] or metric measurements. Note that charts are available to help you find your BMI quickly and easily without having to do these calculations yourself.  To calculate your BMI in English ([place] measurements, your health care provider will:  1. Measure your weight in pounds (lb).  2. Multiply the number of pounds by 703.  ? For example, for a person who weighs 180 lb, multiply that number by 703, which equals [phone],540.  3. Measure your height in inches (in). Then multiply that number by itself to get a measurement called \"inches squared.\"  ? For example, for a person who is 70 in tall, the \"inches squared\" measurement is 70 in x 70 in, which equals 4900 inches squared.  4. Divide the total from Step 2 (number of lb x 703) by the total from Step 3 (inches squared): 126,540 ÷ 4900 = 25.8. This is your BMI.  To calculate your BMI in metric measurements, your health care provider will:  1. Measure your weight in kilograms (kg).  2. Measure your height in meters (m). Then multiply that number by itself to get a measurement called \"meters squared.\"  ? For example, for a person who is 1.75 m tall, the \"meters squared\" measurement is 1.75 m x 1.75 m, which is equal to 3.1 meters squared.  3. Divide the number of kilograms (your weight) by the meters squared number. In this example: 70 ÷ 3.1 = 22.6. This is your BMI.  How is BMI interpreted?  To interpret your results, your health care provider will use BMI charts to identify whether you are underweight, normal weight, overweight, or obese. The following guidelines will be used:  · Underweight: BMI less than 18.5.  · Normal weight: BMI between 18.5 and 24.9.  · Overweight: BMI between 25 and 29.9.  · Obese: BMI of 30 and above.  Please note:  · Weight includes both fat and muscle, so someone with a muscular build, such as an athlete, may have a BMI that is higher than 24.9. In cases like these, BMI is not an accurate measure of body fat.  · To determine " if excess body fat is the cause of a BMI of 25 or higher, further assessments may need to be done by a health care provider.  · BMI is usually interpreted in the same way for men and women.  Why is BMI a useful tool?  BMI is useful in two ways:  · Identifying a weight problem that may be related to a medical condition, or that may increase the risk for medical problems.  · Promoting lifestyle and diet changes in order to reach a healthy weight.  Summary  · Body mass index (BMI) is a number that is calculated from a person's weight and height.  · BMI may help to estimate how much of a person's weight is composed of fat. BMI can help identify those who may be at higher risk for certain medical problems.  · BMI can be measured using English measurements or metric measurements.  · To interpret your results, your health care provider will use BMI charts to identify whether you are underweight, normal weight, overweight, or obese.  This information is not intended to replace advice given to you by your health care provider. Make sure you discuss any questions you have with your health care provider.  Document Released: 08/29/2005 Document Revised: 10/31/2018 Document Reviewed: 10/31/2018  [employer] Interactive Patient Education © 2019 [employer]    Coronary Angiogram With Stent  Coronary angiogram with stent placement is a procedure to widen or open a narrow blood vessel of the heart (coronary artery). Arteries may become blocked by cholesterol buildup (plaques) in the lining of the wall. When a coronary artery becomes partially blocked, blood flow to that area decreases. This may lead to chest pain or a heart attack (myocardial infarction).  A stent is a small piece of metal that looks like mesh or a spring. Stent placement may be done as treatment for a heart attack or right after a coronary angiogram in which a blocked artery is found.  Let your health care provider know about:  · Any allergies you have.  · All  medicines you are taking, including vitamins, herbs, eye drops, creams, and over-the-counter medicines.  · Any problems you or family members have had with anesthetic medicines.  · Any blood disorders you have.  · Any surgeries you have had.  · Any medical conditions you have.  · Whether you are pregnant or may be pregnant.  What are the risks?  Generally, this is a safe procedure. However, problems may occur, including:  · Damage to the heart or its blood vessels.  · A return of blockage.  · Bleeding, infection, or bruising at the insertion site.  · A collection of blood under the skin (hematoma) at the insertion site.  · A blood clot in another part of the body.  · Kidney injury.  · Allergic reaction to the dye or contrast that is used.  · Bleeding into the abdomen (retroperitoneal bleeding).  What happens before the procedure?  Staying hydrated  Follow instructions from your health care provider about hydration, which may include:  · Up to 2 hours before the procedure - you may continue to drink clear liquids, such as water, clear fruit juice, black coffee, and plain tea.    Eating and drinking restrictions  Follow instructions from your health care provider about eating and drinking, which may include:  · 8 hours before the procedure - stop eating heavy meals or foods such as meat, fried foods, or fatty foods.  · 6 hours before the procedure - stop eating light meals or foods, such as toast or cereal.  · 2 hours before the procedure - stop drinking clear liquids.  Ask your health care provider about:  · Changing or stopping your regular medicines. This is especially important if you are taking diabetes medicines or blood thinners.  · Taking medicines such as ibuprofen. These medicines can thin your blood. Do not take these medicines before your procedure if your health care provider instructs you not to. Generally, aspirin is recommended before a procedure of passing a small, thin tube (catheter) through a blood  vessel and into the heart (cardiac catheterization).  What happens during the procedure?    · An IV tube will be inserted into one of your veins.  · You will be given one or more of the following:  ? A medicine to help you relax (sedative).  ? A medicine to numb the area where the catheter will be inserted into an artery (local anesthetic).  · To reduce your risk of infection:  ? Your health care team will wash or sanitize their hands.  ? Your skin will be washed with soap.  ? Hair may be removed from the area where the catheter will be inserted.  · Using a guide wire, the catheter will be inserted into an artery. The location may be in your groin, in your wrist, or in the fold of your arm (near your elbow).  · A type of X-ray (fluoroscopy) will be used to help guide the catheter to the opening of the arteries in the heart.  · A dye will be injected into the catheter, and X-rays will be taken. The dye will help to show where any narrowing or blockages are located in the arteries.  · A tiny wire will be guided to the blocked spot, and a balloon will be inflated to make the artery wider.  · The stent will be expanded and will crush the plaques into the wall of the vessel. The stent will hold the area open and improve the blood flow. Most stents have a drug coating to reduce the risk of the stent narrowing over time.  · The artery may be made wider using a drill, laser, or other tools to remove plaques.  · When the blood flow is better, the catheter will be removed. The lining of the artery will grow over the stent, which stays where it was placed.  This procedure may vary among health care providers and hospitals.  What happens after the procedure?  · If the procedure is done through the leg, you will be kept in bed lying flat for about 6 hours. You will be instructed to not bend and not cross your legs.  · The insertion site will be checked frequently.  · The pulse in your foot or wrist will be checked  frequently.  · You may have additional blood tests, X-rays, and a test that records the electrical activity of your heart (electrocardiogram, or ECG).  This information is not intended to replace advice given to you by your health care provider. Make sure you discuss any questions you have with your health care provider.  Document Released: 06/23/2004 Document Revised: 03/29/2019 Document Reviewed: 07/23/2017  [employer] Interactive Patient Education © 2019 [employer]    Coronary Angiogram With Stent  Coronary angiogram with stent placement is a procedure to widen or open a narrow blood vessel of the heart (coronary artery). Arteries may become blocked by cholesterol buildup (plaques) in the lining of the wall. When a coronary artery becomes partially blocked, blood flow to that area decreases. This may lead to chest pain or a heart attack (myocardial infarction).  A stent is a small piece of metal that looks like mesh or a spring. Stent placement may be done as treatment for a heart attack or right after a coronary angiogram in which a blocked artery is found.  Let your health care provider know about:  · Any allergies you have.  · All medicines you are taking, including vitamins, herbs, eye drops, creams, and over-the-counter medicines.  · Any problems you or family members have had with anesthetic medicines.  · Any blood disorders you have.  · Any surgeries you have had.  · Any medical conditions you have.  · Whether you are pregnant or may be pregnant.  What are the risks?  Generally, this is a safe procedure. However, problems may occur, including:  · Damage to the heart or its blood vessels.  · A return of blockage.  · Bleeding, infection, or bruising at the insertion site.  · A collection of blood under the skin (hematoma) at the insertion site.  · A blood clot in another part of the body.  · Kidney injury.  · Allergic reaction to the dye or contrast that is used.  · Bleeding into the abdomen  (retroperitoneal bleeding).  What happens before the procedure?  Staying hydrated  Follow instructions from your health care provider about hydration, which may include:  · Up to 2 hours before the procedure - you may continue to drink clear liquids, such as water, clear fruit juice, black coffee, and plain tea.    Eating and drinking restrictions  Follow instructions from your health care provider about eating and drinking, which may include:  · 8 hours before the procedure - stop eating heavy meals or foods such as meat, fried foods, or fatty foods.  · 6 hours before the procedure - stop eating light meals or foods, such as toast or cereal.  · 2 hours before the procedure - stop drinking clear liquids.  Ask your health care provider about:  · Changing or stopping your regular medicines. This is especially important if you are taking diabetes medicines or blood thinners.  · Taking medicines such as ibuprofen. These medicines can thin your blood. Do not take these medicines before your procedure if your health care provider instructs you not to. Generally, aspirin is recommended before a procedure of passing a small, thin tube (catheter) through a blood vessel and into the heart (cardiac catheterization).  What happens during the procedure?    · An IV tube will be inserted into one of your veins.  · You will be given one or more of the following:  ? A medicine to help you relax (sedative).  ? A medicine to numb the area where the catheter will be inserted into an artery (local anesthetic).  · To reduce your risk of infection:  ? Your health care team will wash or sanitize their hands.  ? Your skin will be washed with soap.  ? Hair may be removed from the area where the catheter will be inserted.  · Using a guide wire, the catheter will be inserted into an artery. The location may be in your groin, in your wrist, or in the fold of your arm (near your elbow).  · A type of X-ray (fluoroscopy) will be used to help guide  the catheter to the opening of the arteries in the heart.  · A dye will be injected into the catheter, and X-rays will be taken. The dye will help to show where any narrowing or blockages are located in the arteries.  · A tiny wire will be guided to the blocked spot, and a balloon will be inflated to make the artery wider.  · The stent will be expanded and will crush the plaques into the wall of the vessel. The stent will hold the area open and improve the blood flow. Most stents have a drug coating to reduce the risk of the stent narrowing over time.  · The artery may be made wider using a drill, laser, or other tools to remove plaques.  · When the blood flow is better, the catheter will be removed. The lining of the artery will grow over the stent, which stays where it was placed.  This procedure may vary among health care providers and hospitals.  What happens after the procedure?  · If the procedure is done through the leg, you will be kept in bed lying flat for about 6 hours. You will be instructed to not bend and not cross your legs.  · The insertion site will be checked frequently.  · The pulse in your foot or wrist will be checked frequently.  · You may have additional blood tests, X-rays, and a test that records the electrical activity of your heart (electrocardiogram, or ECG).  This information is not intended to replace advice given to you by your health care provider. Make sure you discuss any questions you have with your health care provider.  Document Released: 06/23/2004 Document Revised: 03/29/2019 Document Reviewed: 07/23/2017  [employer] Interactive Patient Education © 2019 [employer]

## 2019-11-05 ENCOUNTER — LAB (OUTPATIENT)
Dept: LAB | Facility: HOSPITAL | Age: 79
End: 2019-11-05

## 2019-11-05 DIAGNOSIS — I10 ESSENTIAL HYPERTENSION: ICD-10-CM

## 2019-11-05 DIAGNOSIS — R07.2 PRECORDIAL PAIN: ICD-10-CM

## 2019-11-05 DIAGNOSIS — I25.10 CAD, MULTIPLE VESSEL: ICD-10-CM

## 2019-11-05 DIAGNOSIS — Z00.00 HEALTHCARE MAINTENANCE: ICD-10-CM

## 2019-11-05 LAB
ANION GAP SERPL CALCULATED.3IONS-SCNC: 13.4 MMOL/L (ref 5–15)
BUN BLD-MCNC: 12 MG/DL (ref 8–23)
BUN/CREAT SERPL: 12.5 (ref 7–25)
CALCIUM SPEC-SCNC: 9.5 MG/DL (ref 8.6–10.5)
CHLORIDE SERPL-SCNC: 103 MMOL/L (ref 98–107)
CO2 SERPL-SCNC: 25.6 MMOL/L (ref 22–29)
CREAT BLD-MCNC: 0.96 MG/DL (ref 0.57–1)
DEPRECATED RDW RBC AUTO: 47.9 FL (ref 37–54)
ERYTHROCYTE [DISTWIDTH] IN BLOOD BY AUTOMATED COUNT: 13.7 % (ref 12.3–15.4)
GFR SERPL CREATININE-BSD FRML MDRD: 56 ML/MIN/1.73
GLUCOSE BLD-MCNC: 101 MG/DL (ref 65–99)
HCT VFR BLD AUTO: 39 % (ref 34–46.6)
HGB BLD-MCNC: 12.3 G/DL (ref 12–15.9)
MCH RBC QN AUTO: 29.8 PG (ref 26.6–33)
MCHC RBC AUTO-ENTMCNC: 31.5 G/DL (ref 31.5–35.7)
MCV RBC AUTO: 94.4 FL (ref 79–97)
PLATELET # BLD AUTO: 175 10*3/MM3 (ref 140–450)
PMV BLD AUTO: 11.1 FL (ref 6–12)
POTASSIUM BLD-SCNC: 4.3 MMOL/L (ref 3.5–5.2)
RBC # BLD AUTO: 4.13 10*6/MM3 (ref 3.77–5.28)
SODIUM BLD-SCNC: 142 MMOL/L (ref 136–145)
WBC NRBC COR # BLD: 4.74 10*3/MM3 (ref 3.4–10.8)

## 2019-11-05 PROCEDURE — 80048 BASIC METABOLIC PNL TOTAL CA: CPT | Performed by: NURSE PRACTITIONER

## 2019-11-05 PROCEDURE — 36415 COLL VENOUS BLD VENIPUNCTURE: CPT

## 2019-11-05 PROCEDURE — 85027 COMPLETE CBC AUTOMATED: CPT | Performed by: NURSE PRACTITIONER

## 2019-11-26 ENCOUNTER — OUTSIDE FACILITY SERVICE (OUTPATIENT)
Dept: CARDIOLOGY | Facility: CLINIC | Age: 79
End: 2019-11-26

## 2019-11-26 PROCEDURE — 93459 L HRT ART/GRFT ANGIO: CPT | Performed by: INTERNAL MEDICINE

## 2019-12-02 ENCOUNTER — OFFICE VISIT (OUTPATIENT)
Dept: CARDIOLOGY | Facility: CLINIC | Age: 79
End: 2019-12-02

## 2019-12-02 VITALS
DIASTOLIC BLOOD PRESSURE: 79 MMHG | WEIGHT: 199.4 LBS | HEIGHT: 67 IN | HEART RATE: 71 BPM | BODY MASS INDEX: 31.3 KG/M2 | OXYGEN SATURATION: 98 % | SYSTOLIC BLOOD PRESSURE: 138 MMHG

## 2019-12-02 DIAGNOSIS — I25.10 CAD, MULTIPLE VESSEL: Primary | ICD-10-CM

## 2019-12-02 DIAGNOSIS — R06.02 SHORTNESS OF BREATH: ICD-10-CM

## 2019-12-02 DIAGNOSIS — E78.5 HYPERLIPIDEMIA, UNSPECIFIED HYPERLIPIDEMIA TYPE: ICD-10-CM

## 2019-12-02 DIAGNOSIS — I27.20 PULMONARY HYPERTENSION (HCC): ICD-10-CM

## 2019-12-02 DIAGNOSIS — I10 ESSENTIAL HYPERTENSION: ICD-10-CM

## 2019-12-02 PROCEDURE — 99214 OFFICE O/P EST MOD 30 MIN: CPT | Performed by: NURSE PRACTITIONER

## 2019-12-02 NOTE — PATIENT INSTRUCTIONS
"Fat and Cholesterol Restricted Eating Plan  Getting too much fat and cholesterol in your diet may cause health problems. Choosing the right foods helps keep your fat and cholesterol at normal levels. This can keep you from getting certain diseases.  Your doctor may recommend an eating plan that includes:  · Total fat: ______% or less of total calories a day.  · Saturated fat: ______% or less of total calories a day.  · Cholesterol: less than _________mg a day.  · Fiber: ______g a day.  What are tips for following this plan?  Meal planning  · At meals, divide your plate into four equal parts:  ? Fill one-half of your plate with vegetables and green salads.  ? Fill one-fourth of your plate with whole grains.  ? Fill one-fourth of your plate with low-fat (lean) protein foods.  · Eat fish that is high in omega-3 fats at least two times a week. This includes mackerel, tuna, sardines, and salmon.  · Eat foods that are high in fiber, such as whole grains, beans, apples, broccoli, carrots, peas, and barley.  General tips    · Work with your doctor to lose weight if you need to.  · Avoid:  ? Foods with added sugar.  ? Fried foods.  ? Foods with partially hydrogenated oils.  · Limit alcohol intake to no more than 1 drink a day for nonpregnant women and 2 drinks a day for men. One drink equals 12 oz of beer, 5 oz of wine, or 1½ oz of hard liquor.  Reading food labels  · Check food labels for:  ? Trans fats.  ? Partially hydrogenated oils.  ? Saturated fat (g) in each serving.  ? Cholesterol (mg) in each serving.  ? Fiber (g) in each serving.  · Choose foods with healthy fats, such as:  ? Monounsaturated fats.  ? Polyunsaturated fats.  ? Omega-3 fats.  · Choose grain products that have whole grains. Look for the word \"whole\" as the first word in the ingredient list.  Cooking  · Cook foods using low-fat methods. These include baking, boiling, grilling, and broiling.  · Eat more home-cooked foods. Eat at restaurants and buffets " less often.  · Avoid cooking using saturated fats, such as butter, cream, palm oil, palm kernel oil, and coconut oil.  Recommended foods    Fruits  · All fresh, canned (in natural juice), or frozen fruits.  Vegetables  · Fresh or frozen vegetables (raw, steamed, roasted, or grilled). Green salads.  Grains  · Whole grains, such as whole wheat or whole grain breads, crackers, cereals, and pasta. Unsweetened oatmeal, bulgur, barley, quinoa, or brown rice. Corn or whole wheat flour tortillas.  Meats and other protein foods  · Ground beef (85% or leaner), grass-fed beef, or beef trimmed of fat. Skinless chicken or turkey. Ground chicken or turkey. Pork trimmed of fat. All fish and seafood. Egg whites. Dried beans, peas, or lentils. Unsalted nuts or seeds. Unsalted canned beans. Nut butters without added sugar or oil.  Dairy  · Low-fat or nonfat dairy products, such as skim or 1% milk, 2% or reduced-fat cheeses, low-fat and fat-free ricotta or cottage cheese, or plain low-fat and nonfat yogurt.  Fats and oils  · Tub margarine without trans fats. Light or reduced-fat mayonnaise and salad dressings. Avocado. Olive, canola, sesame, or safflower oils.  The items listed above may not be a complete list of foods and beverages you can eat. Contact a dietitian for more information.  Foods to avoid  Fruits  · Canned fruit in heavy syrup. Fruit in cream or butter sauce. Fried fruit.  Vegetables  · Vegetables cooked in cheese, cream, or butter sauce. Fried vegetables.  Grains  · White bread. White pasta. White rice. Cornbread. Bagels, pastries, and croissants. Crackers and snack foods that contain trans fat and hydrogenated oils.  Meats and other protein foods  · Fatty cuts of meat. Ribs, chicken wings, chino, sausage, bologna, salami, chitterlings, fatback, hot dogs, bratwurst, and packaged lunch meats. Liver and organ meats. Whole eggs and egg yolks. Chicken and turkey with skin. Fried meat.  Dairy  · Whole or 2% milk, cream,  half-and-half, and cream cheese. Whole milk cheeses. Whole-fat or sweetened yogurt. Full-fat cheeses. Nondairy creamers and whipped toppings. Processed cheese, cheese spreads, and cheese curds.  Beverages  · Alcohol. Sugar-sweetened drinks such as sodas, lemonade, and fruit drinks.  Fats and oils  · Butter, stick margarine, lard, shortening, ghee, or chino fat. Coconut, palm kernel, and palm oils.  Sweets and desserts  · Corn syrup, sugars, honey, and molasses. Candy. Jam and jelly. Syrup. Sweetened cereals. Cookies, pies, cakes, donuts, muffins, and ice cream.  The items listed above may not be a complete list of foods and beverages you should avoid. Contact a dietitian for more information.  Summary  · Choosing the right foods helps keep your fat and cholesterol at normal levels. This can keep you from getting certain diseases.  · At meals, fill one-half of your plate with vegetables and green salads.  · Eat high-fiber foods, like whole grains, beans, apples, carrots, peas, and barley.  · Limit added sugar, saturated fats, alcohol, and fried foods.  This information is not intended to replace advice given to you by your health care provider. Make sure you discuss any questions you have with your health care provider.  Document Released: 06/18/2013 Document Revised: 08/21/2019 Document Reviewed: 09/04/2018  [employer] Interactive Patient Education © 2019 [employer]  BMI for Adults    Body mass index (BMI) is a number that is calculated from a person's weight and height. BMI may help to estimate how much of a person's weight is composed of fat. BMI can help identify those who may be at higher risk for certain medical problems.  How is BMI used with adults?  BMI is used as a screening tool to identify possible weight problems. It is used to check whether a person is obese, overweight, healthy weight, or underweight.  How is BMI calculated?  BMI measures your weight and compares it to your height. This can be done  "either in English ([place] or metric measurements. Note that charts are available to help you find your BMI quickly and easily without having to do these calculations yourself.  To calculate your BMI in English ([place] measurements, your health care provider will:  1. Measure your weight in pounds (lb).  2. Multiply the number of pounds by 703.  ? For example, for a person who weighs 180 lb, multiply that number by 703, which equals 126,540.  3. Measure your height in inches (in). Then multiply that number by itself to get a measurement called \"inches squared.\"  ? For example, for a person who is 70 in tall, the \"inches squared\" measurement is 70 in x 70 in, which equals 4900 inches squared.  4. Divide the total from Step 2 (number of lb x 703) by the total from Step 3 (inches squared): 126,540 ÷ 4900 = 25.8. This is your BMI.  To calculate your BMI in metric measurements, your health care provider will:  1. Measure your weight in kilograms (kg).  2. Measure your height in meters (m). Then multiply that number by itself to get a measurement called \"meters squared.\"  ? For example, for a person who is 1.75 m tall, the \"meters squared\" measurement is 1.75 m x 1.75 m, which is equal to 3.1 meters squared.  3. Divide the number of kilograms (your weight) by the meters squared number. In this example: 70 ÷ 3.1 = 22.6. This is your BMI.  How is BMI interpreted?  To interpret your results, your health care provider will use BMI charts to identify whether you are underweight, normal weight, overweight, or obese. The following guidelines will be used:  · Underweight: BMI less than 18.5.  · Normal weight: BMI between 18.5 and 24.9.  · Overweight: BMI between 25 and 29.9.  · Obese: BMI of 30 and above.  Please note:  · Weight includes both fat and muscle, so someone with a muscular build, such as an athlete, may have a BMI that is higher than 24.9. In cases like these, BMI is not an accurate measure of body fat.  · To determine " if excess body fat is the cause of a BMI of 25 or higher, further assessments may need to be done by a health care provider.  · BMI is usually interpreted in the same way for men and women.  Why is BMI a useful tool?  BMI is useful in two ways:  · Identifying a weight problem that may be related to a medical condition, or that may increase the risk for medical problems.  · Promoting lifestyle and diet changes in order to reach a healthy weight.  Summary  · Body mass index (BMI) is a number that is calculated from a person's weight and height.  · BMI may help to estimate how much of a person's weight is composed of fat. BMI can help identify those who may be at higher risk for certain medical problems.  · BMI can be measured using English measurements or metric measurements.  · To interpret your results, your health care provider will use BMI charts to identify whether you are underweight, normal weight, overweight, or obese.  This information is not intended to replace advice given to you by your health care provider. Make sure you discuss any questions you have with your health care provider.  Document Released: 08/29/2005 Document Revised: 10/31/2018 Document Reviewed: 10/31/2018  [employer] Interactive Patient Education © 2019 [employer]    Nonspecific Chest Pain  Chest pain can be caused by many different conditions. Some causes of chest pain can be life-threatening. These will require treatment right away. Serious causes of chest pain include:  · Heart attack.  · A tear in the body's main blood vessel.  · Redness and swelling (inflammation) around your heart.  · Blood clot in your lungs.  Other causes of chest pain may not be so serious. These include:  · Heartburn.  · Anxiety or stress.  · Damage to bones or muscles in your chest.  · Lung infections.  Chest pain can feel like:  · Pain or discomfort in your chest.  · Crushing, pressure, aching, or squeezing pain.  · Burning or tingling.  · Dull or sharp pain  that is worse when you move, cough, or take a deep breath.  · Pain or discomfort that is also felt in your back, neck, jaw, shoulder, or arm, or pain that spreads to any of these areas.  It is hard to know whether your pain is caused by something that is serious or something that is not so serious. So it is important to see your doctor right away if you have chest pain.  Follow these instructions at home:  Medicines  · Take over-the-counter and prescription medicines only as told by your doctor.  · If you were prescribed an antibiotic medicine, take it as told by your doctor. Do not stop taking the antibiotic even if you start to feel better.  Lifestyle    · Rest as told by your doctor.  · Do not use any products that contain nicotine or tobacco, such as cigarettes, e-cigarettes, and chewing tobacco. If you need help quitting, ask your doctor.  · Do not drink alcohol.  · Make lifestyle changes as told by your doctor. These may include:  ? Getting regular exercise. Ask your doctor what activities are safe for you.  ? Eating a heart-healthy diet. A diet and nutrition specialist (dietitian) can help you to learn healthy eating options.  ? Staying at a healthy weight.  ? Treating diabetes or high blood pressure, if needed.  ? Lowering your stress. Activities such as yoga and relaxation techniques can help.  General instructions  · Pay attention to any changes in your symptoms. Tell your doctor about them or any new symptoms.  · Avoid any activities that cause chest pain.  · Keep all follow-up visits as told by your doctor. This is important. You may need more testing if your chest pain does not go away.  Contact a doctor if:  · Your chest pain does not go away.  · You feel depressed.  · You have a fever.  Get help right away if:  · Your chest pain is worse.  · You have a cough that gets worse, or you cough up blood.  · You have very bad (severe) pain in your belly (abdomen).  · You pass out (faint).  · You have either  of these for no clear reason:  ? Sudden chest discomfort.  ? Sudden discomfort in your arms, back, neck, or jaw.  · You have shortness of breath at any time.  · You suddenly start to sweat, or your skin gets clammy.  · You feel sick to your stomach (nauseous).  · You throw up (vomit).  · You suddenly feel lightheaded or dizzy.  · You feel very weak or tired.  · Your heart starts to beat fast, or it feels like it is skipping beats.  These symptoms may be an emergency. Do not wait to see if the symptoms will go away. Get medical help right away. Call your local emergency services (911 in the [place] Do not drive yourself to the hospital.  Summary  · Chest pain can be caused by many different conditions. The cause may be serious and need treatment right away. If you have chest pain, see your doctor right away.  · Follow your doctor's instructions for taking medicines and making lifestyle changes.  · Keep all follow-up visits as told by your doctor. This includes visits for any further testing if your chest pain does not go away.  · Be sure to know the signs that show that your condition has become worse. Get help right away if you have these symptoms.  This information is not intended to replace advice given to you by your health care provider. Make sure you discuss any questions you have with your health care provider.  Document Released: 06/05/2009 Document Revised: 06/20/2019 Document Reviewed: 06/20/2019  [employer] Interactive Patient Education © 2019 [employer]

## 2019-12-02 NOTE — PROGRESS NOTES
Subjective   Marissa Carey is a 79 y.o. female     Chief Complaint   Patient presents with   • Follow-up     [facility]       HPI    Problem List:    1) Coronary Artery Disease   a. Cath in 2005 by Dr. Galloway revealing multivessel coronary artery disease s/p CABG by Dr. Soto with SHEN   to first diagonal, SVG to OM, SVG to PDA   b. Cath in 2012 revealing triple vessel disease with LIMA to LAD occluded with collaterals from RCA to LAD, with other grafts patents and medical management recommended    c. Stress Test at [facility]5-28-15 - no ischemia, low risk    d. Cath 7/23/15 - Stent RCA   e. Stress Test 10/24/16 - dense relatively fixed apical defect without an assoc wall motion abnormality most c/w attenuation; preserved post   LVEF; indeterminate study like portending low risk   F.  Stress test 10/28/19-lateral wall ischemia, post-rest EF 66%  G.  Left heart cath 11/26/19-right coronary artery was diffusely diseased throughout its length and the posterior descending coronary artery was totally occluded, seen at the junction of proximal thirds of the vessels widely patent no hemodynamic significant stenosis were identified beyond that level except the occluded PDA, the circumflex was occluded at the junction of its proximal portions, LAD was occluded after 2 small proximal diagonal as it was diffusely diseased prior to that level of occlusion, vein graft to the PDA was patent, vein graft to the distal circumflex was patent, bifurcated obtuse marginal which was diffusely diseased beyond the graft touchdown site which was without discrete high-grade stenosis, there was retrograde filled to the level of the body of the circumflex there after intragraft nitroglycerin there was some improvement in caliber of the graft to the OM, subclavian artery graft was performed and inability to pass the catheter beyond the proximal left subclavian, adequate opacification did demonstrate a string sign and LIMA bypass  graft, EF 50 to 55%, LVEDP 20-22  2) Hypertension  2.1) Echo 10/4/16 - mild LVH; EF 60-65%; DD II; early MAC, mild MR, TR and WV; PA 20-25   2.2) echo 10/28/19-borderline LVH, diastolic dysfunction 2, trivial to mild MR, mild TR, PA in the 40s, EF 66 to 70%  3) Dyslipidemia  4) Obesity BMI 37  5) Cerebrovascular disease   a. Hx of TIA with workup in 2008 which was negative  6) Peripheral Vascular disease    a. SHABBIR - < 20% MARIAN; < 50% LICA   b. SHABBIR 10/4/16 - 16-49% MARIAN and LICA and bifurcation; antegrade flow   c. BLE arterial duplex at [facility] hosp. 5-28-15 mild atherosclerotic change in BLE, no sign. plaque or stenoses  7) Osteoarthritis  8) GERD  9) Raynaud's disease.  10) Anxiety/Depression  11) Event Monitor 7/7-7/20/15 - SB - NSR with PVCs      12)  AAA w/repair 1997        A. Abdominal US 7/24/14 - 2.4 CM        B. Abdominal ultrasound 10/20/17-previous aneurysm repair, graft is patent, mid aorta measures up to 2.4 cm        C.  Abdominal ultrasound 5/7/19-no evidence of persistent abdominal aortic aneurysm with maximal AP diameter of 2 cm, no evidence of significant aortic stenosis, iliac arteries are patent     13) postprandial hypotension    Patient is a 79-year-old female who presents today for follow-up status post left heart cath with her  at her side.  She denies any chest pain or pressure.  She does get some chest tightness but she says she has had a lot of anxiety lately.  She says she was having some palpitations but they have calm down.  Patient denies any dizziness, presyncope, syncope, orthopnea, PND or edema.  She says she does have shortness of breath with normal daily activity and this is not changed.  They are hoping to go to [place] for the rest of this year they have been trying to go since October.   describes patient's blood pressure dropping significantly after eating.  He says her blood pressure could be 170 systolic over 105 and when she eats it would drop down to 90  over 40s.  I explained to him postprandial hypotension provided education material.    We went over left heart cath.    Current Outpatient Medications on File Prior to Visit   Medication Sig Dispense Refill   • aspirin 81 MG tablet Take 1 tablet by mouth daily.     • carvedilol (COREG) 6.25 MG tablet Take 1 tablet by mouth 2 (Two) Times a Day. 60 tablet 5   • clopidogrel (PLAVIX) 75 MG tablet Take 1 tablet by mouth daily.     • Docusate Calcium (STOOL SOFTENER PO) Take 1 tablet by mouth daily.     • furosemide (LASIX) 20 MG tablet Take 20 mg by mouth Daily As Needed.     • HYDROcodone-acetaminophen (NORCO) 7.5-325 MG per tablet prn     • isosorbide mononitrate (IMDUR) 30 MG 24 hr tablet Take 30 mg by mouth Daily. Takes 1/2 tablet daily     • LINZESS 145 MCG capsule capsule 145 mcg As Needed.     • losartan (COZAAR) 25 MG tablet Take 1 tablet by mouth Daily. 30 tablet 0   • nitroglycerin (NITROSTAT) 0.4 MG SL tablet Place  under the tongue. Place 1 tablet under the tongue every 5 minutes for up to doses as needed for chest pain . Call 911 if pain persists     • Omega-3 Fatty Acids (FISH OIL) 1200 MG capsule capsule Take  by mouth. With 360 mg omega 3 bid     • ranolazine (RANEXA) 500 MG 12 hr tablet Take 1 tablet by mouth every 12 (twelve) hours.     • rosuvastatin (CRESTOR) 20 MG tablet Take 1 tablet by mouth daily.     • sertraline (ZOLOFT) 50 MG tablet Take 50 mg by mouth Daily.     • [DISCONTINUED] B Complex Vitamins (VITAMIN B COMPLEX) capsule capsule Take  by mouth Daily.     • Alpha-Lipoic Acid 600 MG capsule Take  by mouth Daily.       No current facility-administered medications on file prior to visit.        ALLERGIES    Patient has no known allergies.    Past Medical History:   Diagnosis Date   • Aortic aneurysm (CMS/HCC)    • Bilateral foot pain    • CAD, multiple vessel    • Carotid bruit    • Chest pain    • Claudication (CMS/HCC)    • Deviated septum    • Diminished pulses in lower extremity    • Edema     • Former smoker    • GERD (gastroesophageal reflux disease)    • Hyperlipidemia    • Hypertension    • Neuropathy    • Palpitations    • Pulmonary hypertension (CMS/HCC)    • TIA (transient ischemic attack)    • Tinnitus    • Vision impairment        Social History     Socioeconomic History   • Marital status:      Spouse name: Not on file   • Number of children: Not on file   • Years of education: Not on file   • Highest education level: Not on file   Tobacco Use   • Smoking status: Former Smoker   • Smokeless tobacco: Never Used   Substance and Sexual Activity   • Alcohol use: No   • Drug use: No   • Sexual activity: Defer       Family History   Problem Relation Age of Onset   • Other Mother         acute myocardial infarction   • Aneurysm Mother    • Goiter Mother    • Aneurysm Father    • Sudden death Father    • Heart failure Sister    • Other Other         leukemia   • Cancer Other         thyroid       Review of Systems   Constitutional: Positive for fatigue. Negative for diaphoresis.   HENT: Positive for hearing loss and rhinorrhea (allergies ). Negative for congestion and sore throat.    Eyes: Positive for visual disturbance (glasses daily  ).   Respiratory: Positive for chest tightness (Pt c/o chest pressure- states she thinks it's stress ) and shortness of breath (w/ normal daily activity).    Cardiovascular: Positive for palpitations (States she still has palps sometimes, but it's calmed down). Negative for chest pain (Denies CP ) and leg swelling.        States pt's BP drops when she eats    Gastrointestinal: Positive for constipation (uses docusate as needed ). Negative for abdominal pain, blood in stool, diarrhea, nausea and vomiting.   Endocrine: Positive for cold intolerance. Negative for heat intolerance.   Genitourinary: Negative for difficulty urinating, dysuria, frequency, hematuria and urgency.   Musculoskeletal: Positive for arthralgias, back pain (scoliosis), gait problem (uses cane  "to ambulate.  states that she seems to have some coordination issues when walking. ) and neck pain.   Skin: Negative for rash and wound.   Allergic/Immunologic: Positive for environmental allergies (seasonal ). Negative for food allergies.   Neurological: Positive for weakness (legs want to give out ) and numbness (feet and legs ). Negative for dizziness, syncope, light-headedness and headaches.   Hematological: Bruises/bleeds easily (bruises).   Psychiatric/Behavioral: Positive for sleep disturbance (States she doesn't sleep well, denies SoA at HS. Occasional night terror. ).       Objective   /79   Pulse 71   Ht 170.2 cm (67\")   Wt 90.4 kg (199 lb 6.4 oz)   SpO2 98%   BMI 31.23 kg/m²   Vitals:    12/02/19 0755   BP: 138/79   Pulse: 71   SpO2: 98%   Weight: 90.4 kg (199 lb 6.4 oz)   Height: 170.2 cm (67\")      Lab Results (most recent)     None        Physical Exam   Constitutional: She is oriented to person, place, and time. Vital signs are normal. She appears well-developed and well-nourished. She is active and cooperative.   HENT:   Head: Normocephalic.   Right Ear: Decreased hearing is noted.   Left Ear: Decreased hearing is noted.   Eyes: Lids are normal.   Wears glasses    Neck: Normal carotid pulses, no hepatojugular reflux and no JVD present. Carotid bruit is not present.   Cardiovascular: Normal rate, regular rhythm and normal heart sounds.   Pulses:       Radial pulses are 2+ on the right side, and 2+ on the left side.        Dorsalis pedis pulses are 2+ on the right side, and 2+ on the left side.        Posterior tibial pulses are 2+ on the right side, and 2+ on the left side.   Trace edema RLE; no edema LLE.    Pulmonary/Chest: Effort normal and breath sounds normal.   Abdominal: Normal appearance and bowel sounds are normal.   Musculoskeletal:   Uses a cane    Neurological: She is alert and oriented to person, place, and time.   Skin: Skin is warm, dry and intact. Bruising noted. "   Psychiatric: She has a normal mood and affect. Her speech is normal and behavior is normal. Judgment and thought content normal. Cognition and memory are normal.       Procedure   Procedures         Assessment/Plan      Diagnosis Plan   1. CAD, multiple vessel     2. Essential hypertension     3. Hyperlipidemia, unspecified hyperlipidemia type     4. Pulmonary hypertension (CMS/HCC)     5. Shortness of breath         Return in about 6 months (around 6/2/2020).  CAD-patient's on aspirin, beta, Plavix and statin as well as Ranexa.  Hypertension-patient doing well.  Hyperlipidemia-patient is on Crestor.  Pulmonary hypertension-stable.  Shortness of breath-stable.  She will continue her medication regimen.  She will follow-up in 6 months or sooner if any changes.    Patient's Body mass index is 31.23 kg/m². BMI is above normal parameters. Recommendations include: educational material.      Electronically signed by:

## 2020-05-27 ENCOUNTER — OFFICE VISIT (OUTPATIENT)
Dept: CARDIOLOGY | Facility: CLINIC | Age: 80
End: 2020-05-27

## 2020-05-27 VITALS
DIASTOLIC BLOOD PRESSURE: 64 MMHG | WEIGHT: 191 LBS | HEIGHT: 67 IN | HEART RATE: 68 BPM | BODY MASS INDEX: 29.98 KG/M2 | SYSTOLIC BLOOD PRESSURE: 131 MMHG

## 2020-05-27 DIAGNOSIS — R06.02 SHORTNESS OF BREATH: ICD-10-CM

## 2020-05-27 DIAGNOSIS — I27.20 PULMONARY HYPERTENSION (HCC): ICD-10-CM

## 2020-05-27 DIAGNOSIS — I25.10 CAD, MULTIPLE VESSEL: Primary | ICD-10-CM

## 2020-05-27 DIAGNOSIS — E78.5 HYPERLIPIDEMIA, UNSPECIFIED HYPERLIPIDEMIA TYPE: ICD-10-CM

## 2020-05-27 DIAGNOSIS — I10 ESSENTIAL HYPERTENSION: ICD-10-CM

## 2020-05-27 PROCEDURE — 99441 PR PHYS/QHP TELEPHONE EVALUATION 5-10 MIN: CPT | Performed by: NURSE PRACTITIONER

## 2020-05-27 RX ORDER — FESOTERODINE FUMARATE 4 MG/1
4 TABLET, FILM COATED, EXTENDED RELEASE ORAL DAILY
COMMUNITY
Start: 2020-04-29 | End: 2022-04-25

## 2020-05-27 NOTE — PROGRESS NOTES
Subjective   Marissa Carey is a 79 y.o. female     Chief Complaint   Patient presents with   • Coronary Artery Disease       HPI    Problem List:    1) Coronary Artery Disease   a. Cath in 2005 by Dr. Glaloway revealing multivessel coronary artery disease s/p CABG by Dr. Soto with SHEN   to first diagonal, SVG to OM, SVG to PDA   b. Cath in 2012 revealing triple vessel disease with LIMA to LAD occluded with collaterals from RCA to LAD, with other grafts patents and medical management recommended    c. [facility] at [facility] - no ischemia, low risk    d. Cath 7/23/15 - Stent RCA   e. Stress Test 10/24/16 - dense relatively fixed apical defect without an assoc wall motion abnormality most c/w attenuation; preserved post   LVEF; indeterminate study like portending low risk   F.  Stress test 10/28/19-lateral wall ischemia, post-rest EF 66%  G.  Left heart cath 11/26/19-right coronary artery was diffusely diseased throughout its length and the posterior descending coronary artery was totally occluded, seen at the junction of proximal thirds of the vessels widely patent no hemodynamic significant stenosis were identified beyond that level except the occluded PDA, the circumflex was occluded at the junction of its proximal portions, LAD was occluded after 2 small proximal diagonal as it was diffusely diseased prior to that level of occlusion, vein graft to the PDA was patent, vein graft to the distal circumflex was patent, bifurcated obtuse marginal which was diffusely diseased beyond the graft touchdown site which was without discrete high-grade stenosis, there was retrograde filled to the level of the body of the circumflex there after intragraft nitroglycerin there was some improvement in caliber of the graft to the OM, subclavian artery graft was performed and inability to pass the catheter beyond the proximal left subclavian, adequate opacification did demonstrate a string sign and LIMA  bypass graft, EF 50 to 55%, LVEDP 20-22  2) Hypertension  2.1) Echo 10/4/16 - mild LVH; EF 60-65%; DD II; early MAC, mild MR, TR and AK; PA 20-25   2.2) echo 10/28/19-borderline LVH, diastolic dysfunction 2, trivial to mild MR, mild TR, PA in the 40s, EF 66 to 70%  3) Dyslipidemia  4) Obesity BMI 37  5) Cerebrovascular disease   a. Hx of TIA with workup in 2008 which was negative  6) Peripheral Vascular disease    a. SHABBIR - < 20% MARIAN; < 50% LICA   b. SHABBIR 10/4/16 - 16-49% MARIAN and LICA and bifurcation; antegrade flow   c. BLE arterial duplex at [facility] [place] [facility]. 5-28-15 mild atherosclerotic change in BLE, no sign. plaque or stenoses  7) Osteoarthritis  8) GERD  9) Raynaud's disease.  10) Anxiety/Depression  11) Event Monitor 7/7-7/20/15 - SB - NSR with PVCs      12)  AAA w/repair 1997        A. Abdominal US 7/24/14 - 2.4 CM        B. Abdominal ultrasound 10/20/17-previous aneurysm repair, graft is patent, mid aorta measures up to 2.4 cm        C.  Abdominal ultrasound 5/7/19-no evidence of persistent abdominal aortic aneurysm with maximal AP diameter of 2 cm, no evidence of significant aortic stenosis, iliac arteries are patent     13) postprandial hypotension    Patient is a 79-year-old female who presents today via telephone visit with her  at her side and replacement of office visit due to COVID-19.  She denies any chest pain, pressure, palpitations, fluttering, dizziness, presyncope, syncope, orthopnea or PND.  She says that she has had some lightheadedness with hypotension in the morning.  Her  says that her blood pressure is getting down to the 60s systolic right after she gets up and then it will finally go up about midday after she is active.  He says that she did get some swelling in her leg after falling and fracturing her hip however that has resolved.  He states that her shortness of breath is about the same.  Her  answer most of the questions because she is very hard of hearing.  They would  not want to get out due to the fact of her recent injury as well as the COVID-19.  She is having physical therapy at home.    Current Outpatient Medications on File Prior to Visit   Medication Sig Dispense Refill   • Alpha-Lipoic Acid 600 MG capsule Take  by mouth Daily.     • aspirin 81 MG tablet Take 81 mg by mouth Daily.     • carvedilol (COREG) 6.25 MG tablet Take 1 tablet by mouth 2 (Two) Times a Day. 60 tablet 5   • clopidogrel (PLAVIX) 75 MG tablet Take 75 mg by mouth Daily.     • Docusate Calcium (STOOL SOFTENER PO) Take 1 tablet by mouth daily.     • furosemide (LASIX) 20 MG tablet Take 20 mg by mouth Daily As Needed.     • HYDROcodone-acetaminophen (NORCO) 7.5-325 MG per tablet Take 1 tablet by mouth. prn     • isosorbide mononitrate (IMDUR) 30 MG 24 hr tablet Take 15 mg by mouth Daily. Takes 1/2 tablet daily      • LINZESS 145 MCG capsule capsule 145 mcg As Needed.     • losartan (COZAAR) 25 MG tablet Take 1 tablet by mouth Daily. 30 tablet 0   • nitroglycerin (NITROSTAT) 0.4 MG SL tablet Place  under the tongue. Place 1 tablet under the tongue every 5 minutes for up to doses as needed for chest pain . Call 911 if pain persists     • Omega-3 Fatty Acids (FISH OIL) 1200 MG capsule capsule Take  by mouth. With 360 mg omega 3 bid     • ranolazine (RANEXA) 500 MG 12 hr tablet Take 1 tablet by mouth every 12 (twelve) hours.     • rosuvastatin (CRESTOR) 20 MG tablet Take 20 mg by mouth Daily.     • sertraline (ZOLOFT) 50 MG tablet Take 50 mg by mouth Daily.     • TOVIAZ 4 MG tablet sustained-release 24 hour tablet Take 4 mg by mouth Daily.       No current facility-administered medications on file prior to visit.        ALLERGIES    Patient has no known allergies.    Past Medical History:   Diagnosis Date   • Aortic aneurysm (CMS/HCC)    • Bilateral foot pain    • CAD, multiple vessel    • Carotid bruit    • Chest pain    • Claudication (CMS/HCC)    • Deviated septum    • Diminished pulses in lower extremity     • Edema    • Former smoker    • GERD (gastroesophageal reflux disease)    • Hyperlipidemia    • Hypertension    • Neuropathy    • Palpitations    • Pulmonary hypertension (CMS/HCC)    • TIA (transient ischemic attack)    • Tinnitus    • Vision impairment        Social History     Socioeconomic History   • Marital status:      Spouse name: Not on file   • Number of children: Not on file   • Years of education: Not on file   • Highest education level: Not on file   Tobacco Use   • Smoking status: Former Smoker   • Smokeless tobacco: Never Used   Substance and Sexual Activity   • Alcohol use: No   • Drug use: No   • Sexual activity: Defer       Family History   Problem Relation Age of Onset   • Other Mother         acute myocardial infarction   • Aneurysm Mother    • Goiter Mother    • Aneurysm Father    • Sudden death Father    • Heart failure Sister    • Other Other         leukemia   • Cancer Other         thyroid       Review of Systems   Constitutional: Positive for fatigue (easily fatigued). Negative for diaphoresis.   HENT: Positive for hearing loss ([place]\). Negative for congestion, rhinorrhea and sneezing.    Eyes: Positive for visual disturbance (wears glasses daily).   Respiratory: Positive for shortness of breath (easily SOA; worse on exertion; not much change, maybe a little worse due to being inactive due to recent fx to hip and wrist ). Negative for cough, chest tightness and wheezing.    Cardiovascular: Positive for leg swelling (right after surgery, but resolved. ). Negative for chest pain and palpitations.   Gastrointestinal: Positive for constipation (occasional constipation). Negative for abdominal pain, blood in stool, diarrhea, nausea and vomiting.   Endocrine: Negative for cold intolerance and heat intolerance.   Genitourinary: Positive for frequency (urinary frequency). Negative for difficulty urinating, hematuria and urgency.   Musculoskeletal: Positive for arthralgias (joints) and back  "pain (back pain from scoliosis). Negative for neck pain and neck stiffness.        Fell in April and fx'd right wrist and right hip; had wrist surgery    Skin: Negative for rash and wound.   Allergic/Immunologic: Negative for environmental allergies and food allergies.   Neurological: Positive for weakness (generalized weakness) and light-headedness (light headedness with hypotension; in am before she gets meds BP will be very low ). Negative for dizziness, syncope and numbness.   Hematological: Bruises/bleeds easily (bruises and bleeds easily).   Psychiatric/Behavioral: Positive for confusion (easily confused). Negative for agitation and sleep disturbance (denies waking up smothering/SOA). The patient is not nervous/anxious.        Objective   /64 (BP Location: Left arm, Patient Position: Sitting) Comment: about 1 hr ago with PT  Pulse 68   Ht 170.2 cm (67\")   Wt 86.6 kg (191 lb)   BMI 29.91 kg/m²   Vitals:    05/27/20 1413 05/27/20 1430   BP: (!) 66/40 131/64   BP Location:  Left arm   Patient Position:  Sitting   Pulse: 64 68   Weight: 86.6 kg (191 lb)    Height: 170.2 cm (67\")       Lab Results (most recent)     None        Physical Exam   Psychiatric: She has a normal mood and affect. Her speech is normal and behavior is normal. Judgment and thought content normal. Cognition and memory are normal.   Vitals reviewed.      Procedure   Procedures         Assessment/Plan      Diagnosis Plan   1. CAD, multiple vessel     2. Essential hypertension     3. Hyperlipidemia, unspecified hyperlipidemia type     4. Pulmonary hypertension (CMS/HCC)     5. Shortness of breath         Return keep follow-up .  CAD-patient's on aspirin, statin, Plavix beta.  Hypertension-patient's on carvedilol and losartan.  She will hold her carvedilol morning and or night if her systolic is not greater than 130.  She is taking her Imdur and losartan in the middle of the day.  Hyperlipidemia-patient is on Crestor monitored by PCP.  " Pulmonary hypertension/shortness of breath-patient follows pulmonary.  She will continue medication regimen with above-noted change.  She will follow-up as scheduled.  Her  will call back on Monday and let me know her blood pressures from morning and night and we will make permanent changes at indicated at that time.  She will follow-up sooner if any changes.       Advance Care Planning   ACP discussion was held with the patient during this visit. Patient has an advance directive (not in EMR), copy requested.     You have chosen to receive care through a telephone visit. Do you consent to use a telephone visit for your medical care today? Yes    This visit has been rescheduled as a phone visit to comply with patient safety concerns in accordance with CDC recommendations. Total time of discussion was 9 minutes.    Patient's Body mass index is 29.91 kg/m². BMI is above normal parameters. Recommendations include: educational material and referral to primary care.      Electronically signed by:

## 2020-05-27 NOTE — PATIENT INSTRUCTIONS
"BMI for Adults    Body mass index (BMI) is a number that is calculated from a person's weight and height. BMI may help to estimate how much of a person's weight is composed of fat. BMI can help identify those who may be at higher risk for certain medical problems.  How is BMI used with adults?  BMI is used as a screening tool to identify possible weight problems. It is used to check whether a person is obese, overweight, healthy weight, or underweight.  How is BMI calculated?  BMI measures your weight and compares it to your height. This can be done either in English ([place] or metric measurements. Note that charts are available to help you find your BMI quickly and easily without having to do these calculations yourself.  To calculate your BMI in English ([place] measurements, your health care provider will:  1. Measure your weight in pounds (lb).  2. Multiply the number of pounds by 703.  ? For example, for a person who weighs 180 lb, multiply that number by 703, which equals 126,540.  3. Measure your height in inches (in). Then multiply that number by itself to get a measurement called \"inches squared.\"  ? For example, for a person who is 70 in tall, the \"inches squared\" measurement is 70 in x 70 in, which equals 4900 inches squared.  4. Divide the total from Step 2 (number of lb x 703) by the total from Step 3 (inches squared): 126,540 ÷ 4900 = 25.8. This is your BMI.  To calculate your BMI in metric measurements, your health care provider will:  1. Measure your weight in kilograms (kg).  2. Measure your height in meters (m). Then multiply that number by itself to get a measurement called \"meters squared.\"  ? For example, for a person who is 1.75 m tall, the \"meters squared\" measurement is 1.75 m x 1.75 m, which is equal to 3.1 meters squared.  3. Divide the number of kilograms (your weight) by the meters squared number. In this example: 70 ÷ 3.1 = 22.6. This is your BMI.  How is BMI interpreted?  To interpret your " results, your health care provider will use BMI charts to identify whether you are underweight, normal weight, overweight, or obese. The following guidelines will be used:  · Underweight: BMI less than 18.5.  · Normal weight: BMI between 18.5 and 24.9.  · Overweight: BMI between 25 and 29.9.  · Obese: BMI of 30 and above.  Please note:  · Weight includes both fat and muscle, so someone with a muscular build, such as an athlete, may have a BMI that is higher than 24.9. In cases like these, BMI is not an accurate measure of body fat.  · To determine if excess body fat is the cause of a BMI of 25 or higher, further assessments may need to be done by a health care provider.  · BMI is usually interpreted in the same way for men and women.  Why is BMI a useful tool?  BMI is useful in two ways:  · Identifying a weight problem that may be related to a medical condition, or that may increase the risk for medical problems.  · Promoting lifestyle and diet changes in order to reach a healthy weight.  Summary  · Body mass index (BMI) is a number that is calculated from a person's weight and height.  · BMI may help to estimate how much of a person's weight is composed of fat. BMI can help identify those who may be at higher risk for certain medical problems.  · BMI can be measured using English measurements or metric measurements.  · To interpret your results, your health care provider will use BMI charts to identify whether you are underweight, normal weight, overweight, or obese.  This information is not intended to replace advice given to you by your health care provider. Make sure you discuss any questions you have with your health care provider.  Document Released: 08/29/2005 Document Revised: 11/30/2018 Document Reviewed: 10/31/2018  [employer] Patient Education © 2020 [employer]    Hypotension  As your heart beats, it forces blood through your body. Hypotension, commonly called low blood pressure, is when the force of  "blood pumping through your arteries is too weak. Arteries are blood vessels that carry blood from the heart throughout the body. Depending on the cause and severity, hypotension may be harmless (benign) or may cause serious problems (be critical).  When blood pressure is too low, you may not get enough blood to your brain or to the rest of your organs. This can cause weakness, light-headedness, rapid heartbeat, and fainting.  What are the causes?  This condition may be caused by:  · Blood loss.  · Loss of body fluids (dehydration).  · Heart problems.  · Hormone (endocrine) problems.  · Pregnancy.  · Severe infection.  · Lack of certain nutrients.  · Severe allergic reactions (anaphylaxis).  · Certain medicines, such as blood pressure medicine or medicines that make the body lose excess fluids (diuretics). Sometimes, hypotension may be caused by not taking medicine as directed, such as taking too much of a certain medicine.  What increases the risk?  The following factors may make you more likely to develop this condition:  · Age. Risk increases as you get older.  · Conditions that affect the heart or the central nervous system.  · Taking certain medicines, such as blood pressure medicine or diuretics.  · Being pregnant.  What are the signs or symptoms?  Common symptoms of this condition include:  · Weakness.  · Light-headedness.  · Dizziness.  · Blurred vision.  · Fatigue.  · Rapid heartbeat.  · Fainting, in severe cases.  How is this diagnosed?  This condition is diagnosed based on:  · Your medical history.  · Your symptoms.  · Your blood pressure measurement. Your health care provider will check your blood pressure when you are:  ? Lying down.  ? Sitting.  ? Standing.  A blood pressure reading is recorded as two numbers, such as \"120 over 80\" (or 120/80). The first (\"top\") number is called the systolic pressure. It is a measure of the pressure in your arteries as your heart beats. The second (\"bottom\") number is " called the diastolic pressure. It is a measure of the pressure in your arteries when your heart relaxes between beats. Blood pressure is measured in a unit called mm Hg. Healthy blood pressure for most adults is 120/80. If your blood pressure is below 90/60, you may be diagnosed with hypotension.  Other information or tests that may be used to diagnose hypotension include:  · Your other vital signs, such as your heart rate and temperature.  · Blood tests.  · Tilt table test. For this test, you will be safely secured to a table that moves you from a lying position to an upright position. Your heart rhythm and blood pressure will be monitored during the test.  How is this treated?  Treatment for this condition may include:  · Changing your diet. This may involve eating more salt (sodium) or drinking more water.  · Taking medicines to raise your blood pressure.  · Changing the dosage of certain medicines you are taking that might be lowering your blood pressure.  · Wearing compression stockings. These stockings help to prevent blood clots and reduce swelling in your legs.  In some cases, you may need to go to the hospital for:  · Fluid replacement. This means you will receive fluids through an IV.  · Blood replacement. This means you will receive donated blood through an IV (transfusion).  · Treating an infection or heart problems, if this applies.  · Monitoring. You may need to be monitored while medicines that you are taking wear off.  Follow these instructions at home:  Eating and drinking    · Drink enough fluid to keep your urine pale yellow.  · Eat a healthy diet, and follow instructions from your health care provider about eating or drinking restrictions. A healthy diet includes:  ? Fresh fruits and vegetables.  ? Whole grains.  ? Lean meats.  ? Low-fat dairy products.  · Eat extra salt only as directed. Do not add extra salt to your diet unless your health care provider told you to do that.  · Eat frequent,  small meals.  · Avoid standing up suddenly after eating.  Medicines  · Take over-the-counter and prescription medicines only as told by your health care provider.  ? Follow instructions from your health care provider about changing the dosage of your current medicines, if this applies.  ? Do not stop or adjust any of your medicines on your own.  General instructions    · Wear compression stockings as told by your health care provider.  · Get up slowly from lying down or sitting positions. This gives your blood pressure a chance to adjust.  · Avoid hot showers and excessive heat as directed by your health care provider.  · Return to your normal activities as told by your health care provider. Ask your health care provider what activities are safe for you.  · Do not use any products that contain nicotine or tobacco, such as cigarettes, e-cigarettes, and chewing tobacco. If you need help quitting, ask your health care provider.  · Keep all follow-up visits as told by your health care provider. This is important.  Contact a health care provider if you:  · Vomit.  · Have diarrhea.  · Have a fever for more than 2-3 days.  · Feel more thirsty than usual.  · Feel weak and tired.  Get help right away if you:  · Have chest pain.  · Have a fast or irregular heartbeat.  · Develop numbness in any part of your body.  · Cannot move your arms or your legs.  · Have trouble speaking.  · Become sweaty or feel light-headed.  · Faint.  · Feel short of breath.  · Have trouble staying awake.  · Feel confused.  Summary  · Hypotension is when the force of blood pumping through your arteries is too weak.  · Hypotension may be harmless (benign) or may cause serious problems (be critical).  · Treatment for this condition may include changing your diet, changing your medicines, and wearing compression stockings.  · In some cases, you may need to go to the hospital for fluid or blood replacement.  This information is not intended to replace  advice given to you by your health care provider. Make sure you discuss any questions you have with your health care provider.  Document Released: 12/18/2006 Document Revised: 06/13/2019 Document Reviewed: 06/13/2019  [employer]vier Patient Education © 2020 [employer]

## 2020-06-03 ENCOUNTER — TELEPHONE (OUTPATIENT)
Dept: CARDIOLOGY | Facility: CLINIC | Age: 80
End: 2020-06-03

## 2020-06-03 NOTE — TELEPHONE ENCOUNTER
"----- Message from Kayla Acosta sent at 6/2/2020  9:35 AM EDT -----  Second attempt to reach pt. Left a voicemail for pt to return my call at [phone], requested BPs be left on VM.   ----- Message -----  From: Kayla Acosta  Sent: 6/1/2020   1:53 PM EDT  To: e Card [facility] [place]    Pt's  LVM stating he was told to call regd pt's BP.     Per OV note on 5/27/2020:  \"Hypertension-patient's on carvedilol and losartan.  She will hold her carvedilol morning and or night if her systolic is not greater than 130.  She is taking her Imdur and losartan in the middle of the day.   ...Her  will call back on Monday and let me know her blood pressures from morning and night and we will make permanent changes at indicated at that time.\"      First attempt to reach pt. Left a voicemail for pt to return my call at [phone], requested that he leave BP readings on VM.       "

## 2020-06-03 NOTE — TELEPHONE ENCOUNTER
Pt's  left a message stating to call him [phone]      Fourth attempt to reach pt. Left a voicemail for pt to return my call at [phone], stated to leave reading on the voicemail.

## 2020-06-03 NOTE — TELEPHONE ENCOUNTER
Concepcion w/ [facility]  LVM stating that pt's BP has been low. Sitting 120/72 standing 88/60, Losartan 25mg daily, Coreg 6.25 once in evening, not taking lasix currently, taking Imdur 30mg daily.  called also to report this.   [phone].       [facility], informed them that I've attempted to reach pt's  4x and that I was unable to get a response.  Stated to please let him know that I will relay Concepcion's message to provider.

## 2020-06-03 NOTE — TELEPHONE ENCOUNTER
Rekha Russo APRN [place], Emily   Caller: Unspecified (Today, 11:05 AM)             Have her stop her losartan and monitor BP.  Advise next week of BP readings.          [facility] was transferred to [place], informed her of the above message, she verbalized understanding.

## 2020-06-08 ENCOUNTER — TELEPHONE (OUTPATIENT)
Dept: CARDIOLOGY | Facility: CLINIC | Age: 80
End: 2020-06-08

## 2020-06-08 NOTE — TELEPHONE ENCOUNTER
First attempt to reach pt. Left a voicemail for pt to return my call at [phone].   With return call, please find out what time pt usually takes her Imdur.

## 2020-06-08 NOTE — TELEPHONE ENCOUNTER
Rekha Russo, APRN  You 3 minutes ago (12:47 PM)     Have her take imdur at bedtime.  Keep coreg as is for now and call Thurs with update on BP readings.

## 2020-06-08 NOTE — TELEPHONE ENCOUNTER
"----- Message from DENIS Kingsley sent at 6/8/2020 10:30 AM EDT -----  What time of day is she taking he imdur?  ----- Message -----  From: Kayla Acosta  Sent: 6/8/2020  10:18 AM EDT  To: DENIS Kingsley    Pt's  LVM stating he needs to give BP log.   #[phone]    Per chart review, message from Rekha on 6/3 states:  \"Have her stop her losartan and monitor BP.  Advise next week of BP readings.\"    Called pt, requested BP log. He stated that she stopped her Losartan and gives her Coreg if BP is over 130.   6/4  10am 118/73 73 HR, 8pm 150/80 80 HR    6/5 11am 153/83 66 HR, noon 100/56 77 HR, 7:30pm 151/82 68 HR   6/6 10am 150/76 69 HR, 7pm 103/55 80 HR  6/7 10am 163/84 64 HR, 88/49 79 HR in the pm   6/8 154/92 85 HR      "

## 2020-06-15 ENCOUNTER — TELEPHONE (OUTPATIENT)
Dept: CARDIOLOGY | Facility: CLINIC | Age: 80
End: 2020-06-15

## 2020-06-15 NOTE — TELEPHONE ENCOUNTER
----- Message from DENIS Kingsley sent at 6/15/2020 11:13 AM EDT -----  No additional changes at this time.   ----- Message -----  From: Kayla Acosta  Sent: 6/15/2020  10:57 AM EDT  To: DENIS Kingsley    Pt's  called while I was on another line stating he needs to go over pt's BPs, he was told that I would call him back.       Called pt's  back, confirmed that pt is taking her Imdur at [facility], as that was the last change that Rekha made, he confirmed. Does Coreg once in the evening if above 130, otherwise doesn't give it.    6/9   10am 135/75 69 HR  11am 93/47 71 HR  11pm 143/71 65 HR  6/10  10am 145/80 77 HR  12pm 115/559 79 HR  11pm 157/80 71 HR  6/11  10am 129/65 60 HR  8pm 101/59 71 HR  6/12  11am 149/79 74 HR  146/73 71 HR in evening   6/13  10am 141/70 50 HR  States it dropped to 120's/70s the rest of the day  6/14  11am 178/89 65 HR  11pm 121/81 89 HR

## 2020-07-16 ENCOUNTER — OFFICE VISIT (OUTPATIENT)
Dept: CARDIOLOGY | Facility: CLINIC | Age: 80
End: 2020-07-16

## 2020-07-16 VITALS
SYSTOLIC BLOOD PRESSURE: 160 MMHG | DIASTOLIC BLOOD PRESSURE: 79 MMHG | HEART RATE: 61 BPM | WEIGHT: 191 LBS | HEIGHT: 67 IN | BODY MASS INDEX: 29.98 KG/M2 | OXYGEN SATURATION: 97 %

## 2020-07-16 DIAGNOSIS — I65.23 BILATERAL CAROTID ARTERY STENOSIS: ICD-10-CM

## 2020-07-16 DIAGNOSIS — R06.02 SHORTNESS OF BREATH: ICD-10-CM

## 2020-07-16 DIAGNOSIS — E78.5 HYPERLIPIDEMIA, UNSPECIFIED HYPERLIPIDEMIA TYPE: ICD-10-CM

## 2020-07-16 DIAGNOSIS — R00.2 PALPITATIONS: ICD-10-CM

## 2020-07-16 DIAGNOSIS — I25.10 CAD, MULTIPLE VESSEL: Primary | ICD-10-CM

## 2020-07-16 DIAGNOSIS — I10 ESSENTIAL HYPERTENSION: ICD-10-CM

## 2020-07-16 DIAGNOSIS — I27.20 PULMONARY HYPERTENSION (HCC): ICD-10-CM

## 2020-07-16 DIAGNOSIS — I71.40 ABDOMINAL AORTIC ANEURYSM (AAA) WITHOUT RUPTURE (HCC): ICD-10-CM

## 2020-07-16 DIAGNOSIS — R42 DIZZINESS: ICD-10-CM

## 2020-07-16 DIAGNOSIS — Z98.890 HISTORY OF AAA (ABDOMINAL AORTIC ANEURYSM) REPAIR: ICD-10-CM

## 2020-07-16 PROCEDURE — 93000 ELECTROCARDIOGRAM COMPLETE: CPT | Performed by: NURSE PRACTITIONER

## 2020-07-16 PROCEDURE — 99214 OFFICE O/P EST MOD 30 MIN: CPT | Performed by: NURSE PRACTITIONER

## 2020-07-16 RX ORDER — CARVEDILOL 6.25 MG/1
TABLET ORAL
Qty: 60 TABLET | Refills: 5
Start: 2020-07-16 | End: 2022-10-26

## 2020-07-16 NOTE — PATIENT INSTRUCTIONS
Nonspecific Chest Pain  Chest pain can be caused by many different conditions. Some causes of chest pain can be life-threatening. These will require treatment right away. Serious causes of chest pain include:  · Heart attack.  · A tear in the body's main blood vessel.  · Redness and swelling (inflammation) around your heart.  · Blood clot in your lungs.  Other causes of chest pain may not be so serious. These include:  · Heartburn.  · Anxiety or stress.  · Damage to bones or muscles in your chest.  · Lung infections.  Chest pain can feel like:  · Pain or discomfort in your chest.  · Crushing, pressure, aching, or squeezing pain.  · Burning or tingling.  · Dull or sharp pain that is worse when you move, cough, or take a deep breath.  · Pain or discomfort that is also felt in your back, neck, jaw, shoulder, or arm, or pain that spreads to any of these areas.  It is hard to know whether your pain is caused by something that is serious or something that is not so serious. So it is important to see your doctor right away if you have chest pain.  Follow these instructions at home:  Medicines  · Take over-the-counter and prescription medicines only as told by your doctor.  · If you were prescribed an antibiotic medicine, take it as told by your doctor. Do not stop taking the antibiotic even if you start to feel better.  Lifestyle    · Rest as told by your doctor.  · Do not use any products that contain nicotine or tobacco, such as cigarettes, e-cigarettes, and chewing tobacco. If you need help quitting, ask your doctor.  · Do not drink alcohol.  · Make lifestyle changes as told by your doctor. These may include:  ? Getting regular exercise. Ask your doctor what activities are safe for you.  ? Eating a heart-healthy diet. A diet and nutrition specialist (dietitian) can help you to learn healthy eating options.  ? Staying at a healthy weight.  ? Treating diabetes or high blood pressure, if needed.  ? Lowering your stress.  Activities such as yoga and relaxation techniques can help.  General instructions  · Pay attention to any changes in your symptoms. Tell your doctor about them or any new symptoms.  · Avoid any activities that cause chest pain.  · Keep all follow-up visits as told by your doctor. This is important. You may need more testing if your chest pain does not go away.  Contact a doctor if:  · Your chest pain does not go away.  · You feel depressed.  · You have a fever.  Get help right away if:  · Your chest pain is worse.  · You have a cough that gets worse, or you cough up blood.  · You have very bad (severe) pain in your belly (abdomen).  · You pass out (faint).  · You have either of these for no clear reason:  ? Sudden chest discomfort.  ? Sudden discomfort in your arms, back, neck, or jaw.  · You have shortness of breath at any time.  · You suddenly start to sweat, or your skin gets clammy.  · You feel sick to your stomach (nauseous).  · You throw up (vomit).  · You suddenly feel lightheaded or dizzy.  · You feel very weak or tired.  · Your heart starts to beat fast, or it feels like it is skipping beats.  These symptoms may be an emergency. Do not wait to see if the symptoms will go away. Get medical help right away. Call your local emergency services (911 in the [place] Do not drive yourself to the hospital.  Summary  · Chest pain can be caused by many different conditions. The cause may be serious and need treatment right away. If you have chest pain, see your doctor right away.  · Follow your doctor's instructions for taking medicines and making lifestyle changes.  · Keep all follow-up visits as told by your doctor. This includes visits for any further testing if your chest pain does not go away.  · Be sure to know the signs that show that your condition has become worse. Get help right away if you have these symptoms.  This information is not intended to replace advice given to you by your health care provider. Make  sure you discuss any questions you have with your health care provider.  Document Released: 06/05/2009 Document Revised: 06/20/2019 Document Reviewed: 06/20/2019  [employer] Patient Education © 2020 [employer]  MyPlate from Repair Report    MyPlate is an outline of a general healthy diet based on the 2010 Dietary Guidelines for Americans, from the U.S. Department of Agriculture (USDA). It sets guidelines for how much food you should eat from each food group based on your age, sex, and level of physical activity.  What are tips for following MyPlate?  To follow MyPlate recommendations:  · Eat a wide variety of fruits and vegetables, grains, and protein foods.  · Serve smaller portions and eat less food throughout the day.  · Limit portion sizes to avoid overeating.  · Enjoy your food.  · Get at least 150 minutes of exercise every week. This is about 30 minutes each day, 5 or more days per week.  It can be difficult to have every meal look like MyPlate. Think about MyPlate as eating guidelines for an entire day, rather than each individual meal.  Fruits and vegetables  · Make half of your plate fruits and vegetables.  · Eat many different colors of fruits and vegetables each day.  · For a 2,000 calorie daily food plan, eat:  ? 2½ cups of vegetables every day.  ? 2 cups of fruit every day.  · 1 cup is equal to:  ? 1 cup raw or cooked vegetables.  ? 1 cup raw fruit.  ? 1 medium-sized orange, apple, or banana.  ? 1 cup 100% fruit or vegetable juice.  ? 2 cups raw leafy greens, such as lettuce, spinach, or kale.  ? ½ cup dried fruit.  Grains  · One fourth of your plate should be grains.  · Make at least half of the grains you eat each day whole grains.  · For a 2,000 calorie daily food plan, eat 6 oz of grains every day.  · 1 oz is equal to:  ? 1 slice bread.  ? 1 cup cereal.  ? ½ cup cooked rice, cereal, or pasta.  Protein  · One fourth of your plate should be protein.  · Eat a wide variety of protein foods, including meat,  poultry, fish, eggs, beans, nuts, and tofu.  · For a 2,000 calorie daily food plan, eat 5½ oz of protein every day.  · 1 oz is equal to:  ? 1 oz meat, poultry, or fish.  ? ¼ cup cooked beans.  ? 1 egg.  ? ½ oz nuts or seeds.  ? 1 Tbsp peanut butter.  Dairy  · Drink fat-free or low-fat (1%) milk.  · Eat or drink dairy as a side to meals.  · For a 2,000 calorie daily food plan, eat or drink 3 cups of dairy every day.  · 1 cup is equal to:  ? 1 cup milk, yogurt, cottage cheese, or soy milk (soy beverage).  ? 2 oz processed cheese.  ? 1½ oz natural cheese.  Fats, oils, salt, and sugars  · Only small amounts of oils are recommended.  · Avoid foods that are high in calories and low in nutritional value (empty calories), like foods high in fat or added sugars.  · Choose foods that are low in salt (sodium). Choose foods that have less than 140 milligrams (mg) of sodium per serving.  · Drink water instead of sugary drinks. Drink enough water each day to keep your urine pale yellow.  Where to find support  · Work with your health care provider or a nutrition specialist (dietitian) to develop a customized eating plan that is right for you.  · Download an savannah (mobile application) to help you track your daily food intake.  Where to find more information  · Go to ChooseMyPlate.gov for more information.  · Learn more and log your daily food intake according to MyPlate using USDA's SuperTracker: www.supertracker.usda.gov  Summary  · MyPlate is a general guideline for healthy eating from the USDA. It is based on the 2010 Dietary Guidelines for Americans.  · In general, fruits and vegetables should take up ½ of your plate, grains should take up ¼ of your plate, and protein should take up ¼ of your plate.  This information is not intended to replace advice given to you by your health care provider. Make sure you discuss any questions you have with your health care provider.  Document Released: 01/06/2009 Document Revised: 01/19/2019  Document Reviewed: 03/19/2018  Elsevier Patient Education © 2020 [employer]

## 2020-07-16 NOTE — PROGRESS NOTES
Subjective   Marissa Carey is a 79 y.o. female     Chief Complaint   Patient presents with   • Follow-up       HPI    Problem List:    1) Coronary Artery Disease   a. Cath in 2005 by Dr. Galloway revealing multivessel coronary artery disease s/p CABG by Dr. Soto with SHEN   to first diagonal, SVG to OM, SVG to PDA   b. Cath in 2012 revealing triple vessel disease with LIMA to LAD occluded with collaterals from RCA to LAD, with other grafts patents and medical management recommended    c. Stress Test at [facility]5-28-15 - no ischemia, low risk    d. Cath 7/23/15 - Stent RCA   e. Stress Test 10/24/16 - dense relatively fixed apical defect without an assoc wall motion abnormality most c/w attenuation; preserved post   LVEF; indeterminate study like portending low risk   F.  Stress test 10/28/19-lateral wall ischemia, post-rest EF 66%  G.  Left heart cath 11/26/19-right coronary artery was diffusely diseased throughout its length and the posterior descending coronary artery was totally occluded, seen at the junction of proximal thirds of the vessels widely patent no hemodynamic significant stenosis were identified beyond that level except the occluded PDA, the circumflex was occluded at the junction of its proximal portions, LAD was occluded after 2 small proximal diagonal as it was diffusely diseased prior to that level of occlusion, vein graft to the PDA was patent, vein graft to the distal circumflex was patent, bifurcated obtuse marginal which was diffusely diseased beyond the graft touchdown site which was without discrete high-grade stenosis, there was retrograde filled to the level of the body of the circumflex there after intragraft nitroglycerin there was some improvement in caliber of the graft to the OM, subclavian artery graft was performed and inability to pass the catheter beyond the proximal left subclavian, adequate opacification did demonstrate a string sign and LIMA bypass graft,  EF 50 to 55%, LVEDP 20-22  2) Hypertension  2.1) Echo 10/4/16 - mild LVH; EF 60-65%; DD II; early MAC, mild MR, TR and RI; PA 20-25   2.2) echo 10/28/19-borderline LVH, diastolic dysfunction 2, trivial to mild MR, mild TR, PA in the 40s, EF 66 to 70%  3) Dyslipidemia  4) Obesity BMI 37  5) Cerebrovascular disease   a. Hx of TIA with workup in 2008 which was negative  6) Peripheral Vascular disease    a. SHABBIR - < 20% MARIAN; < 50% LICA   b. SHABBIR 10/4/16 - 16-49% MARIAN and LICA and bifurcation; antegrade flow   c. BLE arterial duplex at [facility] hosp. 5-28-15 mild atherosclerotic change in BLE, no sign. plaque or stenoses  7) Osteoarthritis  8) GERD  9) Raynaud's disease.  10) Anxiety/Depression  11) Event Monitor 7/7-7/20/15 - SB - NSR with PVCs      12)  AAA w/repair 1997        A. Abdominal US 7/24/14 - 2.4 CM        B. Abdominal ultrasound 10/20/17-previous aneurysm repair, graft is patent, mid aorta measures up to 2.4 cm        C.  Abdominal ultrasound 5/7/19-no evidence of persistent abdominal aortic aneurysm with maximal AP diameter of 2 cm, no evidence of significant aortic stenosis, iliac arteries are patent (Repeat every 2 yrs)     13) postprandial hypotension    Patient is a 79-year-old female who presents today for follow-up with her  at her side.  Patient had a fall back in April when she tripped over a lip in her garage fractured her right hip, right wrist and right ankle.  She had to have surgery on her wrist into her hip.  She denies any chest pain or pressure.  She says just very rare occasion she will have a flip-flop when she lays down.  She does get dizzy with too much movement.  She denies any presyncope, syncope, orthopnea or PND.  She does get some swelling which typically mostly goes down but she is had a little more in her right since having the fracture.  She does get short of breath and is been a little more since she fell but that is because she is having a hard time getting around per her  's report.  Overall he feels like she is doing well.    Current Outpatient Medications on File Prior to Visit   Medication Sig Dispense Refill   • Alpha-Lipoic Acid 600 MG capsule Take  by mouth Daily.     • aspirin 81 MG tablet Take 81 mg by mouth Daily.     • clopidogrel (PLAVIX) 75 MG tablet Take 75 mg by mouth Daily.     • Docusate Calcium (STOOL SOFTENER PO) Take 1 tablet by mouth daily.     • furosemide (LASIX) 20 MG tablet Take 20 mg by mouth Daily As Needed.     • HYDROcodone-acetaminophen (NORCO) 7.5-325 MG per tablet Take 1 tablet by mouth. prn     • isosorbide mononitrate (IMDUR) 30 MG 24 hr tablet Take 15 mg by mouth Daily. Takes 1/2 tablet daily      • LINZESS 145 MCG capsule capsule 145 mcg As Needed.     • nitroglycerin (NITROSTAT) 0.4 MG SL tablet Place  under the tongue. Place 1 tablet under the tongue every 5 minutes for up to doses as needed for chest pain . Call 911 if pain persists     • Omega-3 Fatty Acids (FISH OIL) 1200 MG capsule capsule Take  by mouth. With 360 mg omega 3 bid     • ranolazine (RANEXA) 500 MG 12 hr tablet Take 1 tablet by mouth every 12 (twelve) hours.     • rosuvastatin (CRESTOR) 20 MG tablet Take 20 mg by mouth Daily.     • sertraline (ZOLOFT) 50 MG tablet Take 50 mg by mouth Daily.     • TOVIAZ 4 MG tablet sustained-release 24 hour tablet Take 4 mg by mouth Daily.     • [DISCONTINUED] carvedilol (COREG) 6.25 MG tablet Take 1 tablet by mouth 2 (Two) Times a Day. 60 tablet 5     No current facility-administered medications on file prior to visit.        ALLERGIES    Patient has no known allergies.    Past Medical History:   Diagnosis Date   • Aortic aneurysm (CMS/HCC)    • Bilateral foot pain    • CAD, multiple vessel    • Carotid bruit    • Chest pain    • Claudication (CMS/HCC)    • Deviated septum    • Diminished pulses in lower extremity    • Edema    • Former smoker    • GERD (gastroesophageal reflux disease)    • Hyperlipidemia    • Hypertension    • Neuropathy     • Palpitations    • Pulmonary hypertension (CMS/HCC)    • TIA (transient ischemic attack)    • Tinnitus    • Vision impairment        Social History     Socioeconomic History   • Marital status:      Spouse name: Not on file   • Number of children: Not on file   • Years of education: Not on file   • Highest education level: Not on file   Tobacco Use   • Smoking status: Former Smoker   • Smokeless tobacco: Never Used   Substance and Sexual Activity   • Alcohol use: No   • Drug use: No   • Sexual activity: Defer       Family History   Problem Relation Age of Onset   • Other Mother         acute myocardial infarction   • Aneurysm Mother    • Goiter Mother    • Aneurysm Father    • Sudden death Father    • Heart failure Sister    • Other Other         leukemia   • Cancer Other         thyroid       Review of Systems   Constitutional: Positive for fatigue. Negative for diaphoresis.   HENT: Positive for hearing loss and rhinorrhea (allergies ). Negative for congestion and sore throat.    Eyes: Positive for visual disturbance (glasses daily ).   Respiratory: Positive for chest tightness (some) and shortness of breath (w/ normal daily activity; O2 2L NC; maybe litte worse since fall, trying to get around ).    Cardiovascular: Positive for palpitations (flip flop on rare occasion when she lays down) and leg swelling (RLE edema, goes down sometimes; trace right ankle S/P fx ). Negative for chest pain.   Gastrointestinal: Positive for constipation. Negative for abdominal pain, blood in stool, diarrhea, nausea and vomiting.   Endocrine: Positive for cold intolerance. Negative for heat intolerance.   Genitourinary: Positive for frequency. Negative for difficulty urinating, dysuria, hematuria and urgency.   Musculoskeletal: Positive for arthralgias, back pain and gait problem (WC). Negative for neck pain.        Scoliosis; H/O fx'd right hip,right wrist and ankle 4/7/2020 after tripping over lip in garage     Skin:  "Negative for rash and wound.   Allergic/Immunologic: Positive for environmental allergies (seasonal ). Negative for food allergies.   Neurological: Positive for dizziness (w/ too much movement ), weakness (all over) and numbness (legs ). Negative for syncope, light-headedness and headaches.   Hematological: Bruises/bleeds easily (both ).   Psychiatric/Behavioral: Positive for sleep disturbance (States she wakes up a lot to urinate ).       Objective   /79   Pulse 61   Ht 170.2 cm (67\")   Wt 86.6 kg (191 lb) Comment: Pt in WC, stated weight  SpO2 97%   BMI 29.91 kg/m²   Vitals:    07/16/20 0830   BP: 160/79   Pulse: 61   SpO2: 97%   Weight: 86.6 kg (191 lb)   Height: 170.2 cm (67\")      Lab Results (most recent)     None        Physical Exam   Constitutional: She is oriented to person, place, and time. Vital signs are normal. She appears well-developed and well-nourished. She is active and cooperative.   HENT:   Head: Normocephalic.   Eyes: Lids are normal.   Wears glasses    Neck: Normal carotid pulses, no hepatojugular reflux and no JVD present. Carotid bruit is not present.   Cardiovascular: Normal rate, regular rhythm and normal heart sounds.   Pulses:       Radial pulses are 2+ on the right side, and 2+ on the left side.        Dorsalis pedis pulses are 2+ on the right side, and 2+ on the left side.        Posterior tibial pulses are 2+ on the right side, and 2+ on the left side.   Trace edema right ankle; no edema LLE.   Pulmonary/Chest: Effort normal and breath sounds normal.   Abdominal: Normal appearance and bowel sounds are normal.   Musculoskeletal:        Right knee: Tenderness found.   Wheelchair    Neurological: She is alert and oriented to person, place, and time.   Skin: Skin is warm and dry. Abrasion (right shin) noted.   Psychiatric: She has a normal mood and affect. Her speech is normal and behavior is normal. Judgment and thought content normal. Cognition and memory are normal. "       Procedure     ECG 12 Lead  Date/Time: 7/16/2020 9:02 AM  Performed by: Rekha Russo APRN  Authorized by: Rekha Russo APRN   Comparison: compared with previous ECG from 4/29/2019  Similar to previous ECG  Rhythm: sinus rhythm  Rate: normal  BPM: 61  Q waves: V3 and V4    QRS axis: normal    Clinical impression: non-specific ECG                 Assessment/Plan      Diagnosis Plan   1. CAD, multiple vessel  ECG 12 Lead   2. Essential hypertension  ECG 12 Lead    carvedilol (COREG) 6.25 MG tablet   3. Hyperlipidemia, unspecified hyperlipidemia type     4. Pulmonary hypertension (CMS/HCC)     5. Shortness of breath     6. History of AAA (abdominal aortic aneurysm) repair     7. Bilateral carotid artery stenosis  Duplex Carotid Ultrasound CAR   8. Abdominal aortic aneurysm (AAA) without rupture (CMS/HCC)     9. Dizziness  Duplex Carotid Ultrasound CAR   10. Palpitations  ECG 12 Lead       Return in about 6 months (around 1/16/2021).    CAD-patient's on beta, Plavix, aspirin and statin.  Hypertension-patient is still having some postprandial hypotension therefore they are only using the Coreg I for SBP is greater than 130.  She did not need it last night but then this morning her blood pressure is elevated so we will also use it if her SBP is greater than 130 in the morning.  Hyperlipidemia-patient is on Crestor.  Recent LDL was 59.  Pulmonary hypertension-patient has seen pulmonary in the past and no history of sleep apnea.  Shortness of breath-stable.  History of AAA repair-we will repeat ultrasound next year.  Bilateral carotid artery disease/dizziness-patient have carotid artery ultrasound.  She will continue her medication regimen.  She will follow-up in 6 months or sooner if any changes.       Marissa Carey  reports that she has quit smoking. She has never used smokeless tobacco.    Patient's Body mass index is 29.91 kg/m². BMI is above normal parameters. Recommendations include: educational  material.      Electronically signed by:

## 2020-08-03 ENCOUNTER — HOSPITAL ENCOUNTER (OUTPATIENT)
Dept: CARDIOLOGY | Facility: HOSPITAL | Age: 80
Discharge: HOME | End: 2020-08-03
Admitting: NURSE PRACTITIONER

## 2020-08-03 DIAGNOSIS — I65.23 BILATERAL CAROTID ARTERY STENOSIS: ICD-10-CM

## 2020-08-03 DIAGNOSIS — R42 DIZZINESS: ICD-10-CM

## 2020-08-03 PROCEDURE — 93880 EXTRACRANIAL BILAT STUDY: CPT | Performed by: INTERNAL MEDICINE

## 2020-08-03 PROCEDURE — 93880 EXTRACRANIAL BILAT STUDY: CPT

## 2020-08-11 ENCOUNTER — TELEPHONE (OUTPATIENT)
Dept: CARDIOLOGY | Facility: CLINIC | Age: 80
End: 2020-08-11

## 2020-08-11 LAB
BH CV ECHO MEAS - BSA(HAYCOCK): 2 M^2
BH CV ECHO MEAS - BSA: 2 M^2
BH CV ECHO MEAS - BZI_BMI: 29.9 KILOGRAMS/M^2
BH CV ECHO MEAS - BZI_METRIC_HEIGHT: 170.2 CM
BH CV ECHO MEAS - BZI_METRIC_WEIGHT: 86.6 KG
BH CV XLRA MEAS LEFT BULB EDV: -19.2 CM/SEC
BH CV XLRA MEAS LEFT BULB PSV: -76.8 CM/SEC
BH CV XLRA MEAS LEFT CCA RATIO VEL: -58.1 CM/SEC
BH CV XLRA MEAS LEFT DIST CCA EDV: -15.3 CM/SEC
BH CV XLRA MEAS LEFT DIST CCA PSV: -58.5 CM/SEC
BH CV XLRA MEAS LEFT DIST ICA EDV: -30.6 CM/SEC
BH CV XLRA MEAS LEFT DIST ICA PSV: -95.1 CM/SEC
BH CV XLRA MEAS LEFT ICA RATIO VEL: -106 CM/SEC
BH CV XLRA MEAS LEFT ICA/CCA RATIO: 1.8
BH CV XLRA MEAS LEFT MID ICA EDV: -31.4 CM/SEC
BH CV XLRA MEAS LEFT MID ICA PSV: -106.9 CM/SEC
BH CV XLRA MEAS LEFT PROX CCA EDV: 17.5 CM/SEC
BH CV XLRA MEAS LEFT PROX CCA PSV: 68.5 CM/SEC
BH CV XLRA MEAS LEFT PROX ECA EDV: -18.1 CM/SEC
BH CV XLRA MEAS LEFT PROX ECA PSV: -168.1 CM/SEC
BH CV XLRA MEAS LEFT PROX ICA EDV: 19.6 CM/SEC
BH CV XLRA MEAS LEFT PROX ICA PSV: 96.6 CM/SEC
BH CV XLRA MEAS LEFT VERTEBRAL A EDV: -12.6 CM/SEC
BH CV XLRA MEAS LEFT VERTEBRAL A PSV: -48.7 CM/SEC
BH CV XLRA MEAS RIGHT BULB EDV: -11.6 CM/SEC
BH CV XLRA MEAS RIGHT BULB PSV: -59.6 CM/SEC
BH CV XLRA MEAS RIGHT CCA RATIO VEL: -53.5 CM/SEC
BH CV XLRA MEAS RIGHT DIST CCA EDV: -17.6 CM/SEC
BH CV XLRA MEAS RIGHT DIST CCA PSV: -54 CM/SEC
BH CV XLRA MEAS RIGHT DIST ICA EDV: -30.7 CM/SEC
BH CV XLRA MEAS RIGHT DIST ICA PSV: -77.5 CM/SEC
BH CV XLRA MEAS RIGHT ICA RATIO VEL: -85.2 CM/SEC
BH CV XLRA MEAS RIGHT ICA/CCA RATIO: 1.6
BH CV XLRA MEAS RIGHT MID ICA EDV: -26.5 CM/SEC
BH CV XLRA MEAS RIGHT MID ICA PSV: -85.9 CM/SEC
BH CV XLRA MEAS RIGHT PROX CCA EDV: 19.6 CM/SEC
BH CV XLRA MEAS RIGHT PROX CCA PSV: 91.1 CM/SEC
BH CV XLRA MEAS RIGHT PROX ECA EDV: -29.3 CM/SEC
BH CV XLRA MEAS RIGHT PROX ECA PSV: -131.3 CM/SEC
BH CV XLRA MEAS RIGHT PROX ICA EDV: -27.9 CM/SEC
BH CV XLRA MEAS RIGHT PROX ICA PSV: -81 CM/SEC
BH CV XLRA MEAS RIGHT VERTEBRAL A EDV: -11.4 CM/SEC
BH CV XLRA MEAS RIGHT VERTEBRAL A PSV: -39.3 CM/SEC

## 2020-08-11 NOTE — TELEPHONE ENCOUNTER
8/14/20  Patient notified. Will keep follow up as scheduled and call or come in sooner as needed.  [facility]    8/11/20 Left msg asking patient to call.  [facility]    Follow up on 1/20/21 at 1:15pm     ----- Message from DENIS Kingsley sent at 8/11/2020  2:08 PM EDT -----  Please advise patient, on asa and statin      Carotid artery duplex:  1.  Both common carotid arteries are widely patent.  There is somewhat segmental, eccentric, nonobstructive soft appearing plaque in the distal left common carotid artery.     2.  Mild bifurcation disease bilaterally, more prominent on the left, with no flow limitation by echo or Doppler sampling.     3.  Less than 16% stenosis by Doppler in the right internal carotid artery and 16 to 49% stenosis in the mid left internal carotid artery.     4.  Antegrade flow in both vertebral arteries.     Summary: Nonobstructive carotid disease bilaterally as above.  Antegrade flow in both vertebral arteries.

## 2020-08-19 NOTE — TELEPHONE ENCOUNTER
Pt's  LVM stating that Jo called and spoke w/ pt, but that pt can't hear and to call him w/ results.   #[phone]        Per chart review, Venu Carey JR is listed on pt's HIPAA, relationship not specified on form.

## 2021-04-20 ENCOUNTER — TELEPHONE (OUTPATIENT)
Dept: CARDIOLOGY | Facility: CLINIC | Age: 81
End: 2021-04-20

## 2021-04-20 NOTE — TELEPHONE ENCOUNTER
"Yaima called and stated that pt's  is on the phone c/o pt having worsening palps and wants pt seen.       Per chart review, pt N/S her appt w/ TW on 1/20/21.       I took over call:   He stated that pt wasn't feeling well in Jan, so they skipped appt. He stated she's \"extremely SoA, heart beats rather fast w/ any activity.\" I asked about BP, he stated it's around 140's-150's/80's-90's, HR \"not ridiculous.\" I asked how long pt has felt this way, he stated around three weeks or so and she's getting progressively worse. He denies any rx changes since last speaking to TW. States pt gets occasional/rare CP: usually center of chest, non-radiating, short lasting, doesn't worsen SoA. Stated she usually gets CP in the evening. Has not been in the hospital since we saw her, had labs done w/ Dr. Rogers a few months ago.     Called PCP, spoke w/ Aida,  requested most recent labs be faxed over. She stated she would inform nurse and get us lab results as soon as they can.   "

## 2021-04-20 NOTE — TELEPHONE ENCOUNTER
Rekha Russo APRN Lanum, Emily  Caller: Unspecified (Today,  1:36 PM)  See if we can get her in within the next couple of days, any cancellations         Per Alison: Thursday at 8am      Called and informed pt's  of appt, he verbalized understanding.

## 2021-04-22 ENCOUNTER — LAB (OUTPATIENT)
Dept: CARDIOLOGY | Facility: CLINIC | Age: 81
End: 2021-04-22

## 2021-04-22 ENCOUNTER — TELEPHONE (OUTPATIENT)
Dept: CARDIOLOGY | Facility: CLINIC | Age: 81
End: 2021-04-22

## 2021-04-22 ENCOUNTER — OFFICE VISIT (OUTPATIENT)
Dept: CARDIOLOGY | Facility: CLINIC | Age: 81
End: 2021-04-22

## 2021-04-22 VITALS
DIASTOLIC BLOOD PRESSURE: 85 MMHG | HEART RATE: 77 BPM | WEIGHT: 210 LBS | SYSTOLIC BLOOD PRESSURE: 172 MMHG | OXYGEN SATURATION: 96 % | HEIGHT: 66 IN | BODY MASS INDEX: 33.75 KG/M2 | TEMPERATURE: 97.5 F

## 2021-04-22 DIAGNOSIS — R00.2 PALPITATIONS: ICD-10-CM

## 2021-04-22 DIAGNOSIS — I71.40 ABDOMINAL AORTIC ANEURYSM, WITHOUT RUPTURE: ICD-10-CM

## 2021-04-22 DIAGNOSIS — R06.02 SHORTNESS OF BREATH: ICD-10-CM

## 2021-04-22 DIAGNOSIS — I65.23 BILATERAL CAROTID ARTERY STENOSIS: ICD-10-CM

## 2021-04-22 DIAGNOSIS — R07.2 PRECORDIAL PAIN: Primary | ICD-10-CM

## 2021-04-22 DIAGNOSIS — R60.9 PERIPHERAL EDEMA: ICD-10-CM

## 2021-04-22 DIAGNOSIS — I10 ESSENTIAL HYPERTENSION: ICD-10-CM

## 2021-04-22 DIAGNOSIS — R42 DIZZINESS: ICD-10-CM

## 2021-04-22 DIAGNOSIS — Z98.890 HISTORY OF AAA (ABDOMINAL AORTIC ANEURYSM) REPAIR: ICD-10-CM

## 2021-04-22 DIAGNOSIS — R07.2 PRECORDIAL PAIN: ICD-10-CM

## 2021-04-22 DIAGNOSIS — E78.5 HYPERLIPIDEMIA, UNSPECIFIED HYPERLIPIDEMIA TYPE: ICD-10-CM

## 2021-04-22 DIAGNOSIS — I73.9 PVD (PERIPHERAL VASCULAR DISEASE) WITH CLAUDICATION (HCC): ICD-10-CM

## 2021-04-22 DIAGNOSIS — R79.89 POSITIVE D DIMER: Primary | ICD-10-CM

## 2021-04-22 DIAGNOSIS — I25.10 CAD, MULTIPLE VESSEL: ICD-10-CM

## 2021-04-22 DIAGNOSIS — I27.20 PULMONARY HYPERTENSION (HCC): ICD-10-CM

## 2021-04-22 LAB
ANION GAP SERPL CALCULATED.3IONS-SCNC: 11.9 MMOL/L (ref 5–15)
BUN SERPL-MCNC: 21 MG/DL (ref 8–23)
BUN/CREAT SERPL: 20 (ref 7–25)
CALCIUM SPEC-SCNC: 9.4 MG/DL (ref 8.6–10.5)
CHLORIDE SERPL-SCNC: 107 MMOL/L (ref 98–107)
CK MB SERPL-CCNC: 1.66 NG/ML
CO2 SERPL-SCNC: 24.1 MMOL/L (ref 22–29)
CREAT SERPL-MCNC: 1.05 MG/DL (ref 0.57–1)
D DIMER PPP FEU-MCNC: 1.62 MCGFEU/ML (ref 0–0.5)
GFR SERPL CREATININE-BSD FRML MDRD: 51 ML/MIN/1.73
GLUCOSE SERPL-MCNC: 73 MG/DL (ref 65–99)
MAGNESIUM SERPL-MCNC: 2 MG/DL (ref 1.6–2.4)
NT-PROBNP SERPL-MCNC: 257.8 PG/ML (ref 0–1800)
POTASSIUM SERPL-SCNC: 4.5 MMOL/L (ref 3.5–5.2)
SODIUM SERPL-SCNC: 143 MMOL/L (ref 136–145)
T4 FREE SERPL-MCNC: 1.11 NG/DL (ref 0.93–1.7)
TROPONIN T SERPL-MCNC: <0.01 NG/ML (ref 0–0.03)
TSH SERPL DL<=0.05 MIU/L-ACNC: 2.83 UIU/ML (ref 0.27–4.2)

## 2021-04-22 PROCEDURE — 84439 ASSAY OF FREE THYROXINE: CPT | Performed by: NURSE PRACTITIONER

## 2021-04-22 PROCEDURE — 36415 COLL VENOUS BLD VENIPUNCTURE: CPT

## 2021-04-22 PROCEDURE — 85379 FIBRIN DEGRADATION QUANT: CPT | Performed by: NURSE PRACTITIONER

## 2021-04-22 PROCEDURE — 80048 BASIC METABOLIC PNL TOTAL CA: CPT | Performed by: NURSE PRACTITIONER

## 2021-04-22 PROCEDURE — 82553 CREATINE MB FRACTION: CPT | Performed by: NURSE PRACTITIONER

## 2021-04-22 PROCEDURE — 99214 OFFICE O/P EST MOD 30 MIN: CPT | Performed by: NURSE PRACTITIONER

## 2021-04-22 PROCEDURE — 93000 ELECTROCARDIOGRAM COMPLETE: CPT | Performed by: NURSE PRACTITIONER

## 2021-04-22 PROCEDURE — 83880 ASSAY OF NATRIURETIC PEPTIDE: CPT | Performed by: NURSE PRACTITIONER

## 2021-04-22 PROCEDURE — 84481 FREE ASSAY (FT-3): CPT | Performed by: NURSE PRACTITIONER

## 2021-04-22 PROCEDURE — 83735 ASSAY OF MAGNESIUM: CPT | Performed by: NURSE PRACTITIONER

## 2021-04-22 PROCEDURE — 84443 ASSAY THYROID STIM HORMONE: CPT | Performed by: NURSE PRACTITIONER

## 2021-04-22 PROCEDURE — 84484 ASSAY OF TROPONIN QUANT: CPT | Performed by: NURSE PRACTITIONER

## 2021-04-22 RX ORDER — AMLODIPINE BESYLATE 2.5 MG/1
2.5 TABLET ORAL NIGHTLY
COMMUNITY
End: 2022-11-01 | Stop reason: SDUPTHER

## 2021-04-22 RX ORDER — MEMANTINE HYDROCHLORIDE 7 MG/1
28 CAPSULE, EXTENDED RELEASE ORAL EVERY MORNING
COMMUNITY

## 2021-04-22 NOTE — TELEPHONE ENCOUNTER
Lorie received call from the lab stating pt has a critical D Dimer of 1.62.     Per Rekha, pt needs STAT CTA chest today.     Rekha put in order and I sent a message to Odilia informing her.

## 2021-04-22 NOTE — PROGRESS NOTES
Subjective   Marissa Carey is a 80 y.o. female     Chief Complaint   Patient presents with   • Follow-up   • CAD, Multiple vessel       HPI    Problem List:    1) Coronary Artery Disease   a. Cath in 2005 by Dr. Galloway revealing multivessel coronary artery disease s/p CABG by Dr. Soto with SHEN   to first diagonal, SVG to OM, SVG to PDA   b. Cath in 2012 revealing triple vessel disease with LIMA to LAD occluded with collaterals from RCA to LAD, with other grafts patents and medical management recommended    c. Stress Test at [facility]5-28-15 - no ischemia, low risk    d. Cath 7/23/15 - Stent RCA   e. Stress test 10/28/19-lateral wall ischemia, post-rest EF 66%  G.  Left heart cath 11/26/19-right coronary artery was diffusely diseased throughout its length and the posterior descending coronary artery was totally occluded, seen at the junction of proximal thirds of the vessels widely patent no hemodynamic significant stenosis were identified beyond that level except the occluded PDA, the circumflex was occluded at the junction of its proximal portions, LAD was occluded after 2 small proximal diagonal as it was diffusely diseased prior to that level of occlusion, vein graft to the PDA was patent, vein graft to the distal circumflex was patent, bifurcated obtuse marginal which was diffusely diseased beyond the graft touchdown site which was without discrete high-grade stenosis, there was retrograde filled to the level of the body of the circumflex there after intragraft nitroglycerin there was some improvement in caliber of the graft to the OM, subclavian artery graft was performed and inability to pass the catheter beyond the proximal left subclavian, adequate opacification did demonstrate a string sign and LIMA bypass graft, EF 50 to 55%, LVEDP 20-22  2) Hypertension  2.1) Echo 10/4/16 - mild LVH; EF 60-65%; DD II; early MAC, mild MR, TR and SD; PA 20-25   2.2) echo 10/28/19-borderline LVH,  diastolic dysfunction 2, trivial to mild MR, mild TR, PA in the 40s, EF 66 to 70%  3) Dyslipidemia  4) Obesity BMI 37  5) Cerebrovascular disease   a. Hx of TIA with workup in 2008 which was negative  6) Peripheral Vascular disease    a. SHABBIR - < 20% MARIAN; < 50% LICA   b. SHABBIR 10/4/16 - 16-49% MARIAN and LICA and bifurcation; antegrade flow   c. BLE arterial duplex at [facility]. 5-28-15 mild atherosclerotic change in BLE, no sign. plaque or stenoses  7) Osteoarthritis  8) GERD  9) Raynaud's disease.  10) Anxiety/Depression  11) Event Monitor 7/7-7/20/15 - SB - NSR with PVCs      12)  AAA w/repair 1997        A. Abdominal US 7/24/14 - 2.4 CM        B. Abdominal ultrasound 10/20/17-previous aneurysm repair, graft is patent, mid aorta measures up to 2.4 cm        C.  Abdominal ultrasound 5/7/19-no evidence of persistent abdominal aortic aneurysm with maximal AP diameter of 2 cm, no evidence of significant aortic stenosis, iliac arteries are patent (Repeat every 2 yrs)      13) postprandial hypotension     14) Pfizer vaccination 2/13/2021; 3/13/2021    Patient is a 80-year-old female who presents today for follow-up due to increasing chest pain and shortness of breath.  Patient says over the last approximately 3 weeks she has progressively had increase in what she describes as midsternum chest pressure/tightness/heaviness.  It radiates into her shoulders bilaterally.  She does notice it mostly with activity if she stops and rest it resolves.  She has not had to take nitroglycerin due to this.  She says that she will get very short of breath when it occurs.  She says she does have fluttering mostly in the evening when she is sitting and sometimes when she goes to bed.  She says she does have dizziness whenever she gets up and she blames this mostly on her balance.  She says she gets lightheaded as well.  She denies any presyncope, syncope, orthopnea or PND.  She says she gets swelling but very rare and rarely has to use her  water pill.  She says she has had shortness of breath again for the last few weeks and is progressively gotten worse.  Her  said when she took a shower this morning she could barely get to the other room without having a hard time breathing.  She is absolutely fatigued and even when she walks in the ryder she has to hold onto the wall.    We will have the labs that we did have an LDL was 53.  She has gained 19 pounds but she says it is because the eat ice cream every night around 10 PM.  Per patient's  her blood pressure is actually been running very good.  He says it is rarely ever high.    Current Outpatient Medications on File Prior to Visit   Medication Sig Dispense Refill   • amLODIPine (NORVASC) 2.5 MG tablet Take 2.5 mg by mouth Daily.     • aspirin 81 MG tablet Take 81 mg by mouth Daily.     • carvedilol (COREG) 6.25 MG tablet Take one table by mouth in AM if SBP > 130 and take one tablet by mouth in PM if SBP > 130 60 tablet 5   • clopidogrel (PLAVIX) 75 MG tablet Take 75 mg by mouth Daily.     • Docusate Calcium (STOOL SOFTENER PO) Take 1 tablet by mouth daily.     • Ferrous Gluconate (IRON 27 PO) Take 65 mg by mouth Daily.     • furosemide (LASIX) 20 MG tablet Take 20 mg by mouth Daily As Needed.     • HYDROcodone-acetaminophen (NORCO) 7.5-325 MG per tablet Take 1 tablet by mouth. prn     • isosorbide mononitrate (IMDUR) 30 MG 24 hr tablet Take 15 mg by mouth Daily. Takes 1/2 tablet daily      • LINZESS 145 MCG capsule capsule 145 mcg As Needed.     • memantine (NAMENDA XR) 7 MG capsule sustained-release 24 hr extended release capsule Take 28 mg by mouth Daily.     • nitroglycerin (NITROSTAT) 0.4 MG SL tablet Place  under the tongue. Place 1 tablet under the tongue every 5 minutes for up to doses as needed for chest pain . Call 911 if pain persists     • Omega-3 Fatty Acids (FISH OIL) 1200 MG capsule capsule Take  by mouth. With 360 mg omega 3 bid     • ranolazine (RANEXA) 500 MG 12 hr tablet  Take 1 tablet by mouth every 12 (twelve) hours.     • rosuvastatin (CRESTOR) 20 MG tablet Take 20 mg by mouth Daily.     • sertraline (ZOLOFT) 50 MG tablet Take 50 mg by mouth Daily.     • TOVIAZ 4 MG tablet sustained-release 24 hour tablet Take 4 mg by mouth Daily.     • [DISCONTINUED] Alpha-Lipoic Acid 600 MG capsule Take  by mouth Daily.       No current facility-administered medications on file prior to visit.       ALLERGIES    Patient has no known allergies.    Past Medical History:   Diagnosis Date   • Aortic aneurysm (CMS/HCC)    • Bilateral foot pain    • CAD, multiple vessel    • Carotid bruit    • Chest pain    • Claudication (CMS/HCC)    • COVID-19 vaccine administered 02/13/2021 03/13/2021 - [facility]    • Deviated septum    • Diminished pulses in lower extremity    • Edema    • Former smoker    • GERD (gastroesophageal reflux disease)    • Hyperlipidemia    • Hypertension    • Neuropathy    • Palpitations    • Pulmonary hypertension (CMS/HCC)    • TIA (transient ischemic attack)    • Tinnitus    • Vision impairment        Social History     Socioeconomic History   • Marital status:      Spouse name: Not on file   • Number of children: Not on file   • Years of education: Not on file   • Highest education level: Not on file   Tobacco Use   • Smoking status: Former Smoker   • Smokeless tobacco: Never Used   Substance and Sexual Activity   • Alcohol use: No   • Drug use: No   • Sexual activity: Defer       Family History   Problem Relation Age of Onset   • Other Mother         acute myocardial infarction   • Aneurysm Mother    • Goiter Mother    • Aneurysm Father    • Sudden death Father    • Heart failure Sister    • Other Other         leukemia   • Cancer Other         thyroid       Review of Systems   Constitutional: Positive for fatigue (tired alot when she does things; says she is lazy now, always a go, go, go now don't go, go, go anymore, has to use hallway to walk ). Negative for appetite  change, chills, diaphoresis and fever.   HENT: Negative for congestion, rhinorrhea and sore throat.    Eyes: Positive for visual disturbance (glasses ).   Respiratory: Positive for shortness of breath (trouble breathing with anything she does; took shower this morning and could barely get to the room ). Negative for cough, chest tightness and wheezing.    Cardiovascular: Positive for chest pain (over three weeks, gradually gotten worse; pressure/tightness/heaviness midsternum rad into shoulders bilaterally notices it mostly with activity, but if she stops and rests it will resolves, she has not used nitro ), palpitations (fluttering at times; usually in the afternoon when sitting and when she lays down, occasionally ) and leg swelling (both ankles are the worse but she has swelling in the legs and feet; very rarely uses water pill, not much swelling ).   Gastrointestinal: Positive for constipation (pt states she has meds when she needs it ). Negative for abdominal pain, blood in stool, diarrhea, nausea and vomiting.   Endocrine: Negative for cold intolerance and heat intolerance.   Genitourinary: Positive for frequency (she goes alittle more than normal ). Negative for difficulty urinating, dysuria, hematuria and urgency.   Musculoskeletal: Positive for arthralgias (both hands ), back pain (lower and upper back and both shoulders ), gait problem (uses a cane ) and joint swelling (nuckles ). Negative for neck pain and neck stiffness.   Skin: Negative for color change, pallor, rash and wound.   Allergic/Immunologic: Positive for environmental allergies (seasonal ). Negative for food allergies.   Neurological: Positive for dizziness (when she gets up and she states due to balance ), weakness (sever weakness with walking ), light-headedness (when she gets up to fast or is doing alot of things ), numbness (fingers ) and headaches (once in awhile across her forehead ).   Hematological: Bruises/bleeds easily (both due to  "medication ).   Psychiatric/Behavioral: Positive for sleep disturbance (she sleeps good but she states she is having night begum and she has fallen out of the bed a few times ).       Objective   /85   Pulse 77   Temp 97.5 °F (36.4 °C)   Ht 167.6 cm (66\")   Wt 95.3 kg (210 lb)   SpO2 96%   BMI 33.89 kg/m²   Vitals:    04/22/21 0752   BP: 172/85   Pulse: 77   Temp: 97.5 °F (36.4 °C)   SpO2: 96%   Weight: 95.3 kg (210 lb)   Height: 167.6 cm (66\")      Lab Results (most recent)     None        Physical Exam  Vitals reviewed.   Constitutional:       General: She is awake.      Appearance: Normal appearance. She is well-developed and well-groomed. She is obese.   HENT:      Head: Normocephalic.      Right Ear: Decreased hearing noted.      Left Ear: Decreased hearing noted.   Eyes:      General: Lids are normal.      Comments: Wears glasses    Neck:      Vascular: No carotid bruit, hepatojugular reflux or JVD.   Cardiovascular:      Rate and Rhythm: Normal rate and regular rhythm.      Pulses:           Radial pulses are 2+ on the right side and 2+ on the left side.      Heart sounds: Normal heart sounds.      Comments: Decreased pedal pulses BLE  Pulmonary:      Effort: Pulmonary effort is normal.      Breath sounds: Normal breath sounds and air entry.   Abdominal:      General: Bowel sounds are normal.      Palpations: Abdomen is soft.   Musculoskeletal:      Right lower leg: Edema (trace) present.      Left lower leg: Edema (trace) present.      Comments: Uses a cane    Skin:     General: Skin is warm and dry.   Neurological:      Mental Status: She is alert and oriented to person, place, and time.   Psychiatric:         Attention and Perception: Attention and perception normal.         Mood and Affect: Mood and affect normal.         Speech: Speech normal.         Behavior: Behavior normal. Behavior is cooperative.         Thought Content: Thought content normal.         Cognition and Memory: Cognition " and memory normal.         Judgment: Judgment normal.         Procedure     ECG 12 Lead    Date/Time: 4/22/2021 8:17 AM  Performed by: Rekha Russo APRN  Authorized by: Rekha Russo APRN   Comparison: compared with previous ECG from 7/16/2020  Similar to previous ECG  Rhythm: sinus rhythm  Rate: normal  BPM: 68  QRS axis: left  Other findings: low voltage    Clinical impression: abnormal EKG                 Assessment/Plan      Diagnosis Plan   1. Precordial pain  ECG 12 Lead    Troponin    CK-MB    D-dimer, Quantitative   2. CAD, multiple vessel  Adult Transthoracic Echo Complete W/ Cont if Necessary Per Protocol    Stress Test With Myocardial Perfusion One Day    ECG 12 Lead   3. History of AAA (abdominal aortic aneurysm) repair  Ultrasound Abdominal Aorta Limited CAR   4. Essential hypertension  Adult Transthoracic Echo Complete W/ Cont if Necessary Per Protocol    Stress Test With Myocardial Perfusion One Day    Basic Metabolic Panel    ECG 12 Lead   5. Hyperlipidemia, unspecified hyperlipidemia type  Stress Test With Myocardial Perfusion One Day   6. Palpitations  Cardiac Event Monitor    Stress Test With Myocardial Perfusion One Day    Magnesium    TSH    T3, Free    T4, Free    ECG 12 Lead   7. Pulmonary hypertension (CMS/HCC)  Adult Transthoracic Echo Complete W/ Cont if Necessary Per Protocol   8. Shortness of breath  BNP    Adult Transthoracic Echo Complete W/ Cont if Necessary Per Protocol    Stress Test With Myocardial Perfusion One Day    Troponin    CK-MB    D-dimer, Quantitative   9. Bilateral carotid artery stenosis  Duplex Carotid Ultrasound CAR   10. Peripheral edema     11. Dizziness  Cardiac Event Monitor    Duplex Carotid Ultrasound CAR   12. PVD (peripheral vascular disease) with claudication (CMS/HCC)  Duplex Lower Extremity Art / Grafts - Bilateral CAR   13. Abdominal aortic aneurysm, without rupture (CMS/HCC)   Ultrasound Abdominal Aorta Limited CAR       Return in about 12 weeks  (around 7/15/2021).    Chest pain/CAD/hypertension/hyperlipidemia/palpitations/pulmonary hypertension/shortness of breath-patient will have an ischemia work-up, stress and echo.  We will get statin she 930 tomorrow morning.  She was encouraged to use nitroglycerin as needed for chest pain no resolution to go to the ER.  She is going to go troponin, CK-MB, D-dimer, BNP, mag, TSH, free T3 and free T4 today.  Dizziness/carotid artery disease-she will carotid artery ultrasound.  Palpitations/dizziness-she will wear an event monitor for 2 weeks.  PVD/decreased pedal pulses-patient on bilateral lower extremity arterial ultrasound.  She will continue her medication regimen.  She will follow-up in 12 weeks or sooner if any changes or abnormalities with testing.  I did inform them if her cardiac markers come back elevated we will be sending them to the ER.       Marissa Carey  reports that she has quit smoking. She has never used smokeless tobacco..      Patient's Body mass index is 33.89 kg/m². BMI is above normal parameters. Recommendations include: educational material. Advance Care Planning   ACP discussion was declined by the patient. Patient has an advance directive (not in EMR), copy requested.      Electronically signed by:

## 2021-04-22 NOTE — PATIENT INSTRUCTIONS
"BMI for Adults  What is BMI?  Body mass index (BMI) is a number that is calculated from a person's weight and height. BMI can help estimate how much of a person's weight is composed of fat. BMI does not measure body fat directly. Rather, it is an alternative to procedures that directly measure body fat, which can be difficult and expensive.  BMI can help identify people who may be at higher risk for certain medical problems.  What are BMI measurements used for?  BMI is used as a screening tool to identify possible weight problems. It helps determine whether a person is obese, overweight, a healthy weight, or underweight.  BMI is useful for:  · Identifying a weight problem that may be related to a medical condition or may increase the risk for medical problems.  · Promoting changes, such as changes in diet and exercise, to help reach a healthy weight. BMI screening can be repeated to see if these changes are working.  How is BMI calculated?  BMI involves measuring your weight in relation to your height. Both height and weight are measured, and the BMI is calculated from those numbers. This can be done either in English (U[place] or metric measurements. Note that charts and online BMI calculators are available to help you find your BMI quickly and easily without having to do these calculations yourself.  To calculate your BMI in English (U.S[place] measurements:    1. Measure your weight in pounds (lb).  2. Multiply the number of pounds by 703.  ? For example, for a person who weighs 180 lb, multiply that number by 703, which equals 126,540.  3. Measure your height in inches. Then multiply that number by itself to get a measurement called \"inches squared.\"  ? For example, for a person who is 70 inches tall, the \"inches squared\" measurement is 70 inches x 70 inches, which equals 4,900 inches squared.  4. Divide the total from step 2 (number of lb x 703) by the total from step 3 (inches squared): 126,540 ÷ 4,900 = 25.8. This is " "your BMI.  To calculate your BMI in metric measurements:  1. Measure your weight in kilograms (kg).  2. Measure your height in meters (m). Then multiply that number by itself to get a measurement called \"meters squared.\"  ? For example, for a person who is 1.75 m tall, the \"meters squared\" measurement is 1.75 m x 1.75 m, which is equal to 3.1 meters squared.  3. Divide the number of kilograms (your weight) by the meters squared number. In this example: 70 ÷ 3.1 = 22.6. This is your BMI.  What do the results mean?  BMI charts are used to identify whether you are underweight, normal weight, overweight, or obese. The following guidelines will be used:  · Underweight: BMI less than 18.5.  · Normal weight: BMI between 18.5 and 24.9.  · Overweight: BMI between 25 and 29.9.  · Obese: BMI of 30 or above.  Keep these notes in mind:  · Weight includes both fat and muscle, so someone with a muscular build, such as an athlete, may have a BMI that is higher than 24.9. In cases like these, BMI is not an accurate measure of body fat.  · To determine if excess body fat is the cause of a BMI of 25 or higher, further assessments may need to be done by a health care provider.  · BMI is usually interpreted in the same way for men and women.  Where to find more information  For more information about BMI, including tools to quickly calculate your BMI, go to these websites:  · Centers for Disease Control and Prevention: www.cdc.gov  · American Heart Association: www.heart.org  · National Heart, Lung, and Blood [place]: www.nhlbi.nih.gov  Summary  · Body mass index (BMI) is a number that is calculated from a person's weight and height.  · BMI may help estimate how much of a person's weight is composed of fat. BMI can help identify those who may be at higher risk for certain medical problems.  · BMI can be measured using English measurements or metric measurements.  · BMI charts are used to identify whether you are underweight, normal " weight, overweight, or obese.  This information is not intended to replace advice given to you by your health care provider. Make sure you discuss any questions you have with your health care provider.  Document Revised: 09/09/2020 Document Reviewed: 07/17/2020  [employer] Patient Education © 2021 [employer]    Advance Care Planning and Advance Directives     You make decisions on a daily basis - decisions about where you want to live, your career, your home, your life. Perhaps one of the most important decisions you face is your choice for future medical care. Take time to talk with your family and your healthcare team and start planning today.  Advance Care Planning is a process that can help you:  · Understand possible future healthcare decisions in light of your own experiences  · Reflect on those decision in light of your goals and values  · Discuss your decisions with those closest to you and the healthcare professionals that care for you  · Make a plan by creating a document that reflects your wishes    Surrogate Decision Maker  In the event of a medical emergency, which has left you unable to communicate or to make your own decisions, you would need someone to make decisions for you.  It is important to discuss your preferences for medical treatment with this person while you are in good health.     Qualities of a surrogate decision maker:  • Willing to take on this role and responsibility  • Knows what you want for future medical care  • Willing to follow your wishes even if they don't agree with them  • Able to make difficult medical decisions under stressful circumstances    Advance Directives  These are legal documents you can create that will guide your healthcare team and decision maker(s) when needed. These documents can be stored in the electronic medical record.    · Living Will - a legal document to guide your care if you have a terminal condition or a serious illness and are unable to  communicate. States vary by statute in document names/types, but most forms may include one or more of the following:        -  Directions regarding life-prolonging treatments        -  Directions regarding artificially provided nutrition/hydration        -  Choosing a healthcare decision maker        -  Direction regarding organ/tissue donation    · Durable Power of  for Healthcare - this document names an -in-fact to make medical decisions for you, but it may also allow this person to make personal and financial decisions for you. Please seek the advice of an  if you need this type of document.    **Advance Directives are not required and no one may discriminate against you if you do not sign one.    Medical Orders  Many states allow specific forms/orders signed by your physician to record your wishes for medical treatment in your current state of health. This form, signed in personal communication with your physician, addresses resuscitation and other medical interventions that you may or may not want.      For more information or to schedule a time with a [facility] Advance Care Planning Facilitator contact: [email or web address] or call [phone] and someone will contact you directly.

## 2021-04-23 ENCOUNTER — HOSPITAL ENCOUNTER (OUTPATIENT)
Dept: CARDIOLOGY | Facility: HOSPITAL | Age: 81
Discharge: HOME | End: 2021-04-23

## 2021-04-23 ENCOUNTER — TELEPHONE (OUTPATIENT)
Dept: CARDIOLOGY | Facility: CLINIC | Age: 81
End: 2021-04-23

## 2021-04-23 DIAGNOSIS — E78.5 HYPERLIPIDEMIA, UNSPECIFIED HYPERLIPIDEMIA TYPE: ICD-10-CM

## 2021-04-23 DIAGNOSIS — I25.10 CAD, MULTIPLE VESSEL: ICD-10-CM

## 2021-04-23 DIAGNOSIS — I10 ESSENTIAL HYPERTENSION: ICD-10-CM

## 2021-04-23 DIAGNOSIS — R06.02 SHORTNESS OF BREATH: ICD-10-CM

## 2021-04-23 DIAGNOSIS — R00.2 PALPITATIONS: ICD-10-CM

## 2021-04-23 DIAGNOSIS — I27.20 PULMONARY HYPERTENSION (HCC): ICD-10-CM

## 2021-04-23 LAB — T3FREE SERPL-MCNC: 2.46 PG/ML (ref 2–4.4)

## 2021-04-23 PROCEDURE — A9500 TC99M SESTAMIBI: HCPCS | Performed by: INTERNAL MEDICINE

## 2021-04-23 PROCEDURE — 78452 HT MUSCLE IMAGE SPECT MULT: CPT

## 2021-04-23 PROCEDURE — 93018 CV STRESS TEST I&R ONLY: CPT | Performed by: INTERNAL MEDICINE

## 2021-04-23 PROCEDURE — 25010000002 REGADENOSON 0.4 MG/5ML SOLUTION: Performed by: INTERNAL MEDICINE

## 2021-04-23 PROCEDURE — 0 TECHNETIUM SESTAMIBI: Performed by: INTERNAL MEDICINE

## 2021-04-23 PROCEDURE — 93017 CV STRESS TEST TRACING ONLY: CPT

## 2021-04-23 PROCEDURE — 93306 TTE W/DOPPLER COMPLETE: CPT

## 2021-04-23 PROCEDURE — 78452 HT MUSCLE IMAGE SPECT MULT: CPT | Performed by: INTERNAL MEDICINE

## 2021-04-23 PROCEDURE — 93306 TTE W/DOPPLER COMPLETE: CPT | Performed by: INTERNAL MEDICINE

## 2021-04-23 RX ADMIN — REGADENOSON 0.4 MG: 0.08 INJECTION, SOLUTION INTRAVENOUS at 11:21

## 2021-04-23 RX ADMIN — TECHNETIUM TC 99M SESTAMIBI 1 DOSE: 1 INJECTION INTRAVENOUS at 11:21

## 2021-04-23 RX ADMIN — TECHNETIUM TC 99M SESTAMIBI 1 DOSE: 1 INJECTION INTRAVENOUS at 09:54

## 2021-04-23 NOTE — TELEPHONE ENCOUNTER
Lab results :     Free T4   0.93 - 1.70 ng/dL 1.11      Creatinine   0.57 - 1.00 mg/dL 1.05High      Sodium   136 - 145 mmol/L 143      Potassium   3.5 - 5.2 mmol/L 4.5      Magnesium   1.6 - 2.4 mg/dL 2.0      Troponin T   0.000 - 0.030 ng/mL <0.010      proBNP   0.0-1,800.0 pg/mL 257.8      CKMB   <=5.30 ng/mL 1.66      TSH   0.270 - 4.200 uIU/mL 2.830      D-Dimer, Quantitative   0.00 - 0.50 MCGFEU/mL 1.62High Critical       I will reach out to pt once we have her CTA results     BLESSING Fischer

## 2021-04-23 NOTE — TELEPHONE ENCOUNTER
----- Message from DENIS Kingsley sent at 4/22/2021  4:07 PM EDT -----  Rest of her labs are ok. Her Cr. Is slightly elevated.

## 2021-04-26 LAB
BH CV NUCLEAR PRIOR STUDY: 3
BH CV REST NUCLEAR ISOTOPE DOSE: 10 MCI
BH CV STRESS BP STAGE 1: NORMAL
BH CV STRESS COMMENTS STAGE 1: NORMAL
BH CV STRESS DOSE REGADENOSON STAGE 1: 0.4
BH CV STRESS DURATION MIN STAGE 1: 0
BH CV STRESS DURATION SEC STAGE 1: 10
BH CV STRESS HR STAGE 1: 80
BH CV STRESS NUCLEAR ISOTOPE DOSE: 30 MCI
BH CV STRESS PROTOCOL 1: NORMAL
BH CV STRESS RECOVERY BP: NORMAL MMHG
BH CV STRESS RECOVERY HR: 74 BPM
BH CV STRESS STAGE 1: 1
MAXIMAL PREDICTED HEART RATE: 141 BPM
PERCENT MAX PREDICTED HR: 56.74 %
STRESS BASELINE BP: NORMAL MMHG
STRESS BASELINE HR: 62 BPM
STRESS PERCENT HR: 67 %
STRESS POST PEAK BP: NORMAL MMHG
STRESS POST PEAK HR: 80 BPM
STRESS TARGET HR: 120 BPM

## 2021-04-27 ENCOUNTER — OFFICE VISIT (OUTPATIENT)
Dept: CARDIOLOGY | Facility: CLINIC | Age: 81
End: 2021-04-27

## 2021-04-27 VITALS
BODY MASS INDEX: 35.16 KG/M2 | DIASTOLIC BLOOD PRESSURE: 93 MMHG | HEART RATE: 54 BPM | HEIGHT: 65 IN | OXYGEN SATURATION: 93 % | WEIGHT: 211 LBS | SYSTOLIC BLOOD PRESSURE: 166 MMHG

## 2021-04-27 DIAGNOSIS — Z98.890 HISTORY OF AAA (ABDOMINAL AORTIC ANEURYSM) REPAIR: ICD-10-CM

## 2021-04-27 DIAGNOSIS — R94.39 ABNORMAL STRESS TEST: Primary | ICD-10-CM

## 2021-04-27 DIAGNOSIS — R06.02 SHORTNESS OF BREATH: ICD-10-CM

## 2021-04-27 DIAGNOSIS — I25.10 CAD, MULTIPLE VESSEL: ICD-10-CM

## 2021-04-27 DIAGNOSIS — I25.119 CORONARY ARTERY DISEASE INVOLVING NATIVE CORONARY ARTERY OF NATIVE HEART WITH ANGINA PECTORIS (HCC): ICD-10-CM

## 2021-04-27 DIAGNOSIS — I27.20 PULMONARY HYPERTENSION (HCC): ICD-10-CM

## 2021-04-27 DIAGNOSIS — Z01.818 PREOPERATIVE TESTING: ICD-10-CM

## 2021-04-27 DIAGNOSIS — I65.23 BILATERAL CAROTID ARTERY STENOSIS: ICD-10-CM

## 2021-04-27 DIAGNOSIS — E78.5 HYPERLIPIDEMIA, UNSPECIFIED HYPERLIPIDEMIA TYPE: ICD-10-CM

## 2021-04-27 DIAGNOSIS — R09.89 CAROTID BRUIT, UNSPECIFIED LATERALITY: ICD-10-CM

## 2021-04-27 DIAGNOSIS — I73.9 CLAUDICATION (HCC): ICD-10-CM

## 2021-04-27 DIAGNOSIS — I10 ESSENTIAL HYPERTENSION: ICD-10-CM

## 2021-04-27 DIAGNOSIS — R07.2 PRECORDIAL PAIN: ICD-10-CM

## 2021-04-27 DIAGNOSIS — R60.9 PERIPHERAL EDEMA: ICD-10-CM

## 2021-04-27 PROCEDURE — 99214 OFFICE O/P EST MOD 30 MIN: CPT | Performed by: NURSE PRACTITIONER

## 2021-04-27 RX ORDER — ISOSORBIDE MONONITRATE 30 MG/1
30 TABLET, EXTENDED RELEASE ORAL DAILY
Qty: 90 TABLET | Refills: 3 | Status: SHIPPED | OUTPATIENT
Start: 2021-04-27 | End: 2022-11-01 | Stop reason: SDUPTHER

## 2021-04-27 NOTE — PROGRESS NOTES
Subjective   Marissa Carey is a 80 y.o. female     Chief Complaint   Patient presents with   • Follow-up     Abn testing        HPI    Problem List:    1) Coronary Artery Disease   a. Cath in 2005 by Dr. Galloway revealing multivessel coronary artery disease s/p CABG by Dr. Soto with SHEN   to first diagonal, SVG to OM, SVG to PDA   b. Cath in 2012 revealing triple vessel disease with LIMA to LAD occluded with collaterals from RCA to LAD, with other grafts patents and medical management recommended    c. Cath 7/23/15 - Stent RCA   d  Left heart cath 11/26/19-right coronary artery was diffusely diseased throughout its length and the posterior descending coronary artery was totally occluded, seen at the junction of proximal thirds of the vessels widely patent no hemodynamic significant stenosis were identified beyond that level except the occluded PDA, the circumflex was occluded at the junction of its proximal portions, LAD was occluded after 2 small proximal diagonal as it was diffusely diseased prior to that level of occlusion, vein graft to the PDA was patent, vein graft to the distal circumflex was patent, bifurcated obtuse marginal which was diffusely diseased beyond the graft touchdown site which was without discrete high-grade stenosis, there was retrograde filled to the level of the body of the circumflex there after intragraft nitroglycerin there was some improvement in caliber of the graft to the OM, subclavian artery graft was performed and inability to pass the catheter beyond the proximal left subclavian, adequate opacification did demonstrate a string sign and LIMA bypass graft, EF 50 to 55%, LVEDP 20-22  E.  Stress test 4/23/2021-small inferoapical and a large inferolateral wall ischemic defect, post-rest EF 59%, borderline transischemic dilation ratio of 1.18  2) Hypertension  2.1) Echo 10/4/16 - mild LVH; EF 60-65%; DD II; early MAC, mild MR, TR and HI; PA 20-25   2.2) echo 10/28/19-borderline  LVH, diastolic dysfunction 2, trivial to mild MR, mild TR, PA in the 40s, EF 66 to 70%  3) Dyslipidemia  4) Obesity BMI 37  5) Cerebrovascular disease   a. Hx of TIA with workup in 2008 which was negative  6) Peripheral Vascular disease    a. SHABBIR - < 20% MARIAN; < 50% LICA   b. SHABBIR 10/4/16 - 16-49% MARIAN and LICA and bifurcation; antegrade flow   c. BLE arterial duplex at [facility]. 5-28-15 mild atherosclerotic change in BLE, no sign. plaque or stenoses  7) Osteoarthritis  8) GERD  9) Raynaud's disease.  10) Anxiety/Depression  11) Event Monitor 7/7-7/20/15 - SB - NSR with PVCs      12)  AAA w/repair 1997        A. Abdominal US 7/24/14 - 2.4 CM        B. Abdominal ultrasound 10/20/17-previous aneurysm repair, graft is patent, mid aorta measures up to 2.4 cm        C.  Abdominal ultrasound 5/7/19-no evidence of persistent abdominal aortic aneurysm with maximal AP diameter of 2 cm, no evidence of significant aortic stenosis, iliac arteries are patent (Repeat every 2 yrs)      13) postprandial hypotension     14) Pfizer vaccination 2/13/2021; 3/13/2021      15)  Emphysema, Dr. Moreno     Patient is a 80-year-old female who presents today for follow-up on testing with her  at her side.  She continues to have what she describes as center chest heaviness dull discomfort that she is unsure if it radiates because she has back problems already.  Patient says whenever it does happen it does increase her shortness of breath.  She has not taken any nitroglycerin but it occurs with very minimal activity which really concerns her.  She is on isosorbide, amlodipine, Ranexa and beta-blocker.  I am going to increase her Imdur.  She denies any palpitations, fluttering, dizziness, presyncope, syncope, orthopnea, PND or edema.  She is short of breath again with a very minimal activity.  Her and her  both are very concerned about her symptoms.    We went over stress test and CT of the chest.  There is only symptoms we have at  this time.    Current Outpatient Medications on File Prior to Visit   Medication Sig Dispense Refill   • amLODIPine (NORVASC) 2.5 MG tablet Take 2.5 mg by mouth Daily.     • aspirin 81 MG tablet Take 81 mg by mouth Daily.     • carvedilol (COREG) 6.25 MG tablet Take one table by mouth in AM if SBP > 130 and take one tablet by mouth in PM if SBP > 130 60 tablet 5   • clopidogrel (PLAVIX) 75 MG tablet Take 75 mg by mouth Daily.     • Docusate Calcium (STOOL SOFTENER PO) Take 1 tablet by mouth daily.     • Ferrous Gluconate (IRON 27 PO) Take 65 mg by mouth Daily.     • furosemide (LASIX) 20 MG tablet Take 20 mg by mouth Daily As Needed.     • HYDROcodone-acetaminophen (NORCO) 7.5-325 MG per tablet Take 1 tablet by mouth. prn     • LINZESS 145 MCG capsule capsule 145 mcg As Needed.     • memantine (NAMENDA XR) 7 MG capsule sustained-release 24 hr extended release capsule Take 28 mg by mouth Daily.     • nitroglycerin (NITROSTAT) 0.4 MG SL tablet Place  under the tongue. Place 1 tablet under the tongue every 5 minutes for up to doses as needed for chest pain . Call 911 if pain persists     • Omega-3 Fatty Acids (FISH OIL) 1200 MG capsule capsule Take  by mouth. With 360 mg omega 3 bid     • ranolazine (RANEXA) 500 MG 12 hr tablet Take 1 tablet by mouth every 12 (twelve) hours.     • rosuvastatin (CRESTOR) 20 MG tablet Take 20 mg by mouth Daily.     • sertraline (ZOLOFT) 50 MG tablet Take 50 mg by mouth Daily.     • TOVIAZ 4 MG tablet sustained-release 24 hour tablet Take 4 mg by mouth Daily.     • [DISCONTINUED] isosorbide mononitrate (IMDUR) 30 MG 24 hr tablet Take 15 mg by mouth Daily. Takes 1/2 tablet daily        No current facility-administered medications on file prior to visit.       ALLERGIES    Patient has no known allergies.    Past Medical History:   Diagnosis Date   • Aortic aneurysm (CMS/HCC)    • Bilateral foot pain    • CAD, multiple vessel    • Carotid bruit    • Chest pain    • Claudication (CMS/HCC)     • COVID-19 vaccine administered 02/13/2021 03/13/2021 - Pfzier    • Deviated septum    • Diminished pulses in lower extremity    • Edema    • Former smoker    • GERD (gastroesophageal reflux disease)    • Hyperlipidemia    • Hypertension    • Neuropathy    • Palpitations    • Pulmonary hypertension (CMS/HCC)    • TIA (transient ischemic attack)    • Tinnitus    • Vision impairment        Social History     Socioeconomic History   • Marital status:      Spouse name: Not on file   • Number of children: Not on file   • Years of education: Not on file   • Highest education level: Not on file   Tobacco Use   • Smoking status: Former Smoker   • Smokeless tobacco: Never Used   Substance and Sexual Activity   • Alcohol use: No   • Drug use: No   • Sexual activity: Defer       Family History   Problem Relation Age of Onset   • Other Mother         acute myocardial infarction   • Aneurysm Mother    • Goiter Mother    • Aneurysm Father    • Sudden death Father    • Heart failure Sister    • Other Other         leukemia   • Cancer Other         thyroid       Review of Systems   Constitutional: Positive for fatigue. Negative for diaphoresis.   HENT: Positive for congestion (Stuffy ). Negative for rhinorrhea and sore throat.    Eyes: Positive for visual disturbance (Glasses daily).   Respiratory: Positive for shortness of breath (w/ any activity ). Negative for chest tightness.    Cardiovascular: Positive for chest pain (Centered dull pain- states her heart feels heavy. Non-radiating; unsure due to back problems; increased shortnes so breath; no nitro with minimal activity ). Negative for palpitations and leg swelling.   Gastrointestinal: Positive for blood in stool (Occasionally from hemorrhoids and due to straining from constipation ) and constipation. Negative for abdominal pain, diarrhea, nausea and vomiting.   Endocrine: Negative for cold intolerance and heat intolerance.   Genitourinary: Negative for difficulty  "urinating, dysuria, frequency, hematuria and urgency.   Musculoskeletal: Positive for arthralgias, back pain, gait problem (Ambulates w/ cane ) and neck pain.   Skin: Negative for rash and wound.   Allergic/Immunologic: Positive for environmental allergies (Seasonal ). Negative for food allergies.   Neurological: Positive for numbness (occasionally  right leg is numb from knee to thigh ) and headaches (Occasional HA ). Negative for dizziness, syncope, weakness and light-headedness.   Hematological: Bruises/bleeds easily (Both ).   Psychiatric/Behavioral: Negative for sleep disturbance (Generally sleeps well at night ).       Objective   /93   Pulse 54   Ht 165.1 cm (65\")   Wt 95.7 kg (211 lb)   SpO2 93%   BMI 35.11 kg/m²   Vitals:    04/27/21 1501   BP: 166/93   Pulse: 54   SpO2: 93%   Weight: 95.7 kg (211 lb)   Height: 165.1 cm (65\")      Lab Results (most recent)     None        Physical Exam  Vitals reviewed.   Constitutional:       General: She is awake.      Appearance: Normal appearance. She is well-developed and well-groomed. She is obese.   HENT:      Head: Normocephalic.   Eyes:      General: Lids are normal.      Comments: Wears glasses    Neck:      Vascular: No carotid bruit, hepatojugular reflux or JVD.   Cardiovascular:      Rate and Rhythm: Regular rhythm. Bradycardia present.      Pulses:           Radial pulses are 2+ on the right side and 2+ on the left side.        Dorsalis pedis pulses are 2+ on the right side and 2+ on the left side.        Posterior tibial pulses are 2+ on the right side and 2+ on the left side.      Heart sounds: Normal heart sounds.   Pulmonary:      Effort: Pulmonary effort is normal.      Breath sounds: Normal breath sounds and air entry.   Abdominal:      General: Bowel sounds are normal.      Palpations: Abdomen is soft.   Musculoskeletal:      Right lower leg: Edema (right ) present.      Left lower leg: No edema.      Comments: Uses a cane    Skin:     " General: Skin is warm and dry.      Findings: Bruising present.   Neurological:      Mental Status: She is alert and oriented to person, place, and time.   Psychiatric:         Attention and Perception: Attention and perception normal.         Mood and Affect: Mood and affect normal.         Speech: Speech normal.         Behavior: Behavior normal. Behavior is cooperative.         Thought Content: Thought content normal.         Cognition and Memory: Cognition and memory normal.         Judgment: Judgment normal.         Procedure   Procedures         Assessment/Plan      Diagnosis Plan   1. Abnormal stress test  isosorbide mononitrate (IMDUR) 30 MG 24 hr tablet    Case Request Cath Lab: Left Heart Cath   2. Coronary artery disease involving native coronary artery of native heart with angina pectoris (CMS/HCC)  isosorbide mononitrate (IMDUR) 30 MG 24 hr tablet    Case Request Cath Lab: Left Heart Cath   3. CAD, multiple vessel     4. Bilateral carotid artery stenosis     5. History of AAA (abdominal aortic aneurysm) repair     6. Essential hypertension  Case Request Cath Lab: Left Heart Cath   7. Hyperlipidemia, unspecified hyperlipidemia type  Case Request Cath Lab: Left Heart Cath   8. Shortness of breath  Case Request Cath Lab: Left Heart Cath   9. Pulmonary hypertension (CMS/HCC)     10. Peripheral edema     11. Precordial pain  isosorbide mononitrate (IMDUR) 30 MG 24 hr tablet    Case Request Cath Lab: Left Heart Cath       Return in about 6 weeks (around 6/8/2021).    Abnormal stress test/CAD/hypertension/hyperlipidemia/shortness of breath/chest pain-patient will proceed with left heart cath.  We will increase her Imdur.  She was encouraged to use nitroglycerin as needed for chest pain no resolution to go to the ER.  She will continue her medication regimen otherwise.  She will follow-up after heart cath or sooner if any changes.  We will call with results of her other testing as soon as we receive them.        Marissa Carey  reports that she has quit smoking. She has never used smokeless tobacco.       Patient's Body mass index is 35.11 kg/m². BMI is above normal parameters. Recommendations include: educational material.      Advance Care Planning   ACP discussion was declined by the patient. Patient has an advance directive (not in EMR), copy requested.    Electronically signed by:

## 2021-04-27 NOTE — PATIENT INSTRUCTIONS
MyPlate from USDA    MyPlate is an outline of a general healthy diet based on the 2010 Dietary Guidelines for Americans, from the U.S. Department of Agriculture (USDA). It sets guidelines for how much food you should eat from each food group based on your age, sex, and level of physical activity.  What are tips for following MyPlate?  To follow MyPlate recommendations:  · Eat a wide variety of fruits and vegetables, grains, and protein foods.  · Serve smaller portions and eat less food throughout the day.  · Limit portion sizes to avoid overeating.  · Enjoy your food.  · Get at least 150 minutes of exercise every week. This is about 30 minutes each day, 5 or more days per week.  It can be difficult to have every meal look like MyPlate. Think about MyPlate as eating guidelines for an entire day, rather than each individual meal.  Fruits and vegetables  · Make half of your plate fruits and vegetables.  · Eat many different colors of fruits and vegetables each day.  · For a 2,000 calorie daily food plan, eat:  ? 2½ cups of vegetables every day.  ? 2 cups of fruit every day.  · 1 cup is equal to:  ? 1 cup raw or cooked vegetables.  ? 1 cup raw fruit.  ? 1 medium-sized orange, apple, or banana.  ? 1 cup 100% fruit or vegetable juice.  ? 2 cups raw leafy greens, such as lettuce, spinach, or kale.  ? ½ cup dried fruit.  Grains  · One fourth of your plate should be grains.  · Make at least half of the grains you eat each day whole grains.  · For a 2,000 calorie daily food plan, eat 6 oz of grains every day.  · 1 oz is equal to:  ? 1 slice bread.  ? 1 cup cereal.  ? ½ cup cooked rice, cereal, or pasta.  Protein  · One fourth of your plate should be protein.  · Eat a wide variety of protein foods, including meat, poultry, fish, eggs, beans, nuts, and tofu.  · For a 2,000 calorie daily food plan, eat 5½ oz of protein every day.  · 1 oz is equal to:  ? 1 oz meat, poultry, or fish.  ? ¼ cup cooked beans.  ? 1 egg.  ? ½ oz nuts  or seeds.  ? 1 Tbsp peanut butter.  Dairy  · Drink fat-free or low-fat (1%) milk.  · Eat or drink dairy as a side to meals.  · For a 2,000 calorie daily food plan, eat or drink 3 cups of dairy every day.  · 1 cup is equal to:  ? 1 cup milk, yogurt, cottage cheese, or soy milk (soy beverage).  ? 2 oz processed cheese.  ? 1½ oz natural cheese.  Fats, oils, salt, and sugars  · Only small amounts of oils are recommended.  · Avoid foods that are high in calories and low in nutritional value (empty calories), like foods high in fat or added sugars.  · Choose foods that are low in salt (sodium). Choose foods that have less than 140 milligrams (mg) of sodium per serving.  · Drink water instead of sugary drinks. Drink enough water each day to keep your urine pale yellow.  Where to find support  · Work with your health care provider or a nutrition specialist (dietitian) to develop a customized eating plan that is right for you.  · Download an savannah (mobile application) to help you track your daily food intake.  Where to find more information  · Go to ChooseMyPlate.gov for more information.  Summary  · MyPlate is a general guideline for healthy eating from the USDA. It is based on the 2010 Dietary Guidelines for Americans.  · In general, fruits and vegetables should take up ½ of your plate, grains should take up ¼ of your plate, and protein should take up ¼ of your plate.  This information is not intended to replace advice given to you by your health care provider. Make sure you discuss any questions you have with your health care provider.  Document Revised: 05/21/2020 Document Reviewed: 03/19/2018  [employer] Patient Education © 2021 [employer]    Coronary Angiogram With Stent  Coronary angiogram with stent placement is a procedure to widen or open a narrow blood vessel of the heart (coronary artery). Arteries may become blocked by cholesterol buildup (plaques) in the lining of the artery wall. When a coronary artery becomes  partially blocked, blood flow to that area decreases. This may lead to chest pain or a heart attack (myocardial infarction).  A stent is a small piece of metal that looks like mesh or spring. Stent placement may be done as treatment after a heart attack, or to prevent a heart attack if a blocked artery is found by a coronary angiogram.  Let your health care provider know about:  · Any allergies you have, including allergies to medicines or contrast dye.  · All medicines you are taking, including vitamins, herbs, eye drops, creams, and over-the-counter medicines.  · Any problems you or family members have had with anesthetic medicines.  · Any blood disorders you have.  · Any surgeries you have had.  · Any medical conditions you have, including kidney problems or kidney failure.  · Whether you are pregnant or may be pregnant.  · Whether you are breastfeeding.  What are the risks?  Generally, this is a safe procedure. However, serious problems may occur, including:  · Damage to nearby structures or organs, such as the heart, blood vessels, or kidneys.  · A return of blockage.  · Bleeding, infection, or bruising at the insertion site.  · A collection of blood under the skin (hematoma) at the insertion site.  · A blood clot in another part of the body.  · Allergic reaction to medicines or dyes.  · Bleeding into the abdomen (retroperitoneal bleeding).  · Stroke (rare).  · Heart attack (rare).  What happens before the procedure?  Staying hydrated  Follow instructions from your health care provider about hydration, which may include:  · Up to 2 hours before the procedure - you may continue to drink clear liquids, such as water, clear fruit juice, black coffee, and plain tea.    Eating and drinking restrictions  Follow instructions from your health care provider about eating and drinking, which may include:  · 8 hours before the procedure - stop eating heavy meals or foods, such as meat, fried foods, or fatty foods.  · 6  hours before the procedure - stop eating light meals or foods, such as toast or cereal.  · 2 hours before the procedure - stop drinking clear liquids.  Medicines  Ask your health care provider about:  · Changing or stopping your regular medicines. This is especially important if you are taking diabetes medicines or blood thinners.  · Taking medicines such as aspirin and ibuprofen. These medicines can thin your blood. Do not take these medicines unless your health care provider tells you to take them.  ? Generally, aspirin is recommended before a thin tube, called a catheter, is passed through a blood vessel and inserted into the heart (cardiac catheterization).  · Taking over-the-counter medicines, vitamins, herbs, and supplements.  General instructions  · Do not use any products that contain nicotine or tobacco for at least 4 weeks before the procedure. These products include cigarettes, e-cigarettes, and chewing tobacco. If you need help quitting, ask your health care provider.  · Plan to have someone take you home from the hospital or clinic.  · If you will be going home right after the procedure, plan to have someone with you for 24 hours.  · You may have tests and imaging procedures.  · Ask your health care provider:  ? How your insertion site will be marked. Ask which artery will be used for the procedure.  ? What steps will be taken to help prevent infection. These may include:  § Removing hair at the insertion site.  § Washing skin with a germ-killing soap.  § Taking antibiotic medicine.  What happens during the procedure?    · An IV will be inserted into one of your veins.  · Electrodes may be placed on your chest to monitor your heart rate during the procedure.  · You will be given one or more of the following:  ? A medicine to help you relax (sedative).  ? A medicine to numb the area (local anesthetic) for catheter insertion.  · A small incision will be made for catheter insertion.  · The catheter will be  inserted into an artery using a guide wire. The location may be in your groin, your wrist, or the fold of your arm (near your elbow).  · An X-ray procedure (fluoroscopy) will be used to help guide the catheter to the opening of the heart arteries.  · A dye will be injected into the catheter. X-rays will be taken. The dye helps to show where any narrowing or blockages are located in the arteries.  · Tell your health care provider if you have chest pain or trouble breathing.  · A tiny wire will be guided to the blocked spot, and a balloon will be inflated to make the artery wider.  · The stent will be expanded to crush the plaques into the wall of the vessel. The stent will hold the area open and improve the blood flow. Most stents have a drug coating to reduce the risk of the stent narrowing over time.  · The artery may be made wider using a drill, laser, or other tools that remove plaques.  · The catheter will be removed when the blood flow improves. The stent will stay where it was placed, and the lining of the artery will grow over it.  · A bandage (dressing) will be placed on the insertion site. Pressure will be applied to stop bleeding.  · The IV will be removed.  This procedure may vary among health care providers and hospitals.  What happens after the procedure?  · Your blood pressure, heart rate, breathing rate, and blood oxygen level will be monitored until you leave the hospital or clinic.  · If the procedure is done through the leg, you will lie flat in bed for a few hours or for as long as told by your health care provider. You will be instructed not to bend or cross your legs.  · The insertion site and the pulse in your foot or wrist will be checked often.  · You may have more blood tests, X-rays, and a test that records the electrical activity of your heart (electrocardiogram, or ECG).  · Do not drive for 24 hours if you were given a sedative during your procedure.  Summary  · Coronary angiogram with  stent placement is a procedure to widen or open a narrowed coronary artery. This is done to treat heart problems.  · Before the procedure, let your health care provider know about all the medical conditions and surgeries you have or have had.  · This is a safe procedure. However, some problems may occur, including damage to nearby structures or organs, bleeding, blood clots, or allergies.  · Follow your health care provider's instructions about eating, drinking, medicines, and other lifestyle changes, such as quitting tobacco use before the procedure.  This information is not intended to replace advice given to you by your health care provider. Make sure you discuss any questions you have with your health care provider.  Document Revised: 07/08/2020 Document Reviewed: 07/08/2020  Elsevier Patient Education © 2021 [employer]

## 2021-04-28 ENCOUNTER — TELEPHONE (OUTPATIENT)
Dept: CARDIOLOGY | Facility: CLINIC | Age: 81
End: 2021-04-28

## 2021-04-28 ENCOUNTER — TRANSCRIBE ORDERS (OUTPATIENT)
Dept: ADMINISTRATIVE | Facility: HOSPITAL | Age: 81
End: 2021-04-28

## 2021-04-28 DIAGNOSIS — R94.39 ABNORMAL STRESS TEST: Primary | ICD-10-CM

## 2021-04-28 DIAGNOSIS — Z01.818 PREOPERATIVE TESTING: Primary | ICD-10-CM

## 2021-04-28 NOTE — TELEPHONE ENCOUNTER
Spoke with  regarding Heart Cath for Friday . He was advised to  Covid order today .Pt was instructed to not have any food 6 hrs prior to Heart Cath and that she could drink fluids, she is not on any medication that will need to be held .He was also advised of directions and where to park . Pt ( ) gave verbal understanding and he will come by today to pick lab orders up has he will also take a copy of the results to Dr Odell office on Monday has well has giving us a copy of results     BLESSING Fischer

## 2021-04-28 NOTE — TELEPHONE ENCOUNTER
Marissa are scheduled for their cardiac cath Friday, April 30 @ 0730 with Dr. Bates at [facility]. They need to refrain from eating 6 hrs prior to their cath however, they can consume liquids up to the time of their procedure. They MUST have their COVID test performed within 72 hrs of their procedure. It must be a rapid nasopharyngeal test. Please instruct them to take a copy of the test with them Friday morning and note on the order for the results to be faxed to our office ASAP. As soon as these are received, I will send them to Julianna at Dr. Bates’s office. Please notify the patients of this appt and instructions. They must hold glucophage and metformin for 2 days prior to their cath. Pt will use entrance # 2 for  parking or entrance # 1 if they elect to self park in the [facility] parking garage.        Kayla please call them both this am and input orders for the covid screening as I did not as usually jose a office does. Have them go this am to a location that will have rapid turn around. Alison knows a place that gives them results same day!          First attempt to reach pt. Left a voicemail for pt to return my call at [phone], opt 6.   Called both Marissa's true and Alexs.

## 2021-04-29 ENCOUNTER — TELEPHONE (OUTPATIENT)
Dept: CARDIOLOGY | Facility: CLINIC | Age: 81
End: 2021-04-29

## 2021-04-29 NOTE — TELEPHONE ENCOUNTER
Pt presented in clinic for assistance w/ monitor.   Per chart review, pt is to wear monitor for 14 days.     Assisted pt w/ monitor placement and explained how to use monitor: charging the monitor and phone, to wear #1 during the day and #2 at HS, to charge #2 during the day and charge #1 and the phone at HS, how to document sx by pushing the monitor button. Also explained not to recurrently change the strips as it causes more skin irritation. Gave pt sensitive strips, as she has sensitive skin.   Stated that the monitor will  anything serious or critical automatically, then [employer] and most likely our office will call to discuss what was seen on monitor.   I told them to call me with any issues as no instruction booklet was given to pt in the box.   Pt verbalized understanding of the above

## 2021-04-30 ENCOUNTER — HOSPITAL ENCOUNTER (OUTPATIENT)
Facility: HOSPITAL | Age: 81
Discharge: HOME | End: 2021-04-30
Attending: INTERNAL MEDICINE | Admitting: INTERNAL MEDICINE

## 2021-04-30 VITALS
RESPIRATION RATE: 18 BRPM | OXYGEN SATURATION: 95 % | SYSTOLIC BLOOD PRESSURE: 135 MMHG | DIASTOLIC BLOOD PRESSURE: 70 MMHG | WEIGHT: 210.1 LBS | HEART RATE: 68 BPM | BODY MASS INDEX: 33.77 KG/M2 | TEMPERATURE: 97 F | HEIGHT: 66 IN

## 2021-04-30 DIAGNOSIS — R94.39 ABNORMAL STRESS TEST: ICD-10-CM

## 2021-04-30 LAB
DEPRECATED RDW RBC AUTO: 48.7 FL (ref 37–54)
ERYTHROCYTE [DISTWIDTH] IN BLOOD BY AUTOMATED COUNT: 14.2 % (ref 12.3–15.4)
HCT VFR BLD AUTO: 38.5 % (ref 34–46.6)
HGB BLD-MCNC: 12 G/DL (ref 12–15.9)
MCH RBC QN AUTO: 29.1 PG (ref 26.6–33)
MCHC RBC AUTO-ENTMCNC: 31.2 G/DL (ref 31.5–35.7)
MCV RBC AUTO: 93.4 FL (ref 79–97)
PLATELET # BLD AUTO: 166 10*3/MM3 (ref 140–450)
PMV BLD AUTO: 10.8 FL (ref 6–12)
RBC # BLD AUTO: 4.12 10*6/MM3 (ref 3.77–5.28)
WBC # BLD AUTO: 5.09 10*3/MM3 (ref 3.4–10.8)

## 2021-04-30 PROCEDURE — 25010000002 FENTANYL CITRATE (PF) 100 MCG/2ML SOLUTION: Performed by: INTERNAL MEDICINE

## 2021-04-30 PROCEDURE — 93459 L HRT ART/GRFT ANGIO: CPT | Performed by: INTERNAL MEDICINE

## 2021-04-30 PROCEDURE — C1769 GUIDE WIRE: HCPCS | Performed by: INTERNAL MEDICINE

## 2021-04-30 PROCEDURE — 25010000002 MIDAZOLAM PER 1 MG: Performed by: INTERNAL MEDICINE

## 2021-04-30 PROCEDURE — 0 IOPAMIDOL PER 1 ML: Performed by: INTERNAL MEDICINE

## 2021-04-30 PROCEDURE — 63710000001 ASPIRIN EC 325 MG TABLET DELAYED-RELEASE: Performed by: PHYSICIAN ASSISTANT

## 2021-04-30 PROCEDURE — 85027 COMPLETE CBC AUTOMATED: CPT | Performed by: INTERNAL MEDICINE

## 2021-04-30 PROCEDURE — 25010000002 HEPARIN (PORCINE) PER 1000 UNITS: Performed by: INTERNAL MEDICINE

## 2021-04-30 PROCEDURE — A9270 NON-COVERED ITEM OR SERVICE: HCPCS | Performed by: PHYSICIAN ASSISTANT

## 2021-04-30 PROCEDURE — C1894 INTRO/SHEATH, NON-LASER: HCPCS | Performed by: INTERNAL MEDICINE

## 2021-04-30 RX ORDER — FENTANYL CITRATE 50 UG/ML
INJECTION, SOLUTION INTRAMUSCULAR; INTRAVENOUS AS NEEDED
Status: DISCONTINUED | OUTPATIENT
Start: 2021-04-30 | End: 2021-04-30 | Stop reason: HOSPADM

## 2021-04-30 RX ORDER — SODIUM CHLORIDE 9 MG/ML
250 INJECTION, SOLUTION INTRAVENOUS CONTINUOUS
Status: ACTIVE | OUTPATIENT
Start: 2021-04-30 | End: 2021-04-30

## 2021-04-30 RX ORDER — MIDAZOLAM HYDROCHLORIDE 1 MG/ML
INJECTION INTRAMUSCULAR; INTRAVENOUS AS NEEDED
Status: DISCONTINUED | OUTPATIENT
Start: 2021-04-30 | End: 2021-04-30 | Stop reason: HOSPADM

## 2021-04-30 RX ORDER — ALPRAZOLAM 0.25 MG/1
0.25 TABLET ORAL 3 TIMES DAILY PRN
Status: DISCONTINUED | OUTPATIENT
Start: 2021-04-30 | End: 2021-04-30 | Stop reason: HOSPADM

## 2021-04-30 RX ORDER — LIDOCAINE HYDROCHLORIDE 10 MG/ML
INJECTION, SOLUTION EPIDURAL; INFILTRATION; INTRACAUDAL; PERINEURAL AS NEEDED
Status: DISCONTINUED | OUTPATIENT
Start: 2021-04-30 | End: 2021-04-30 | Stop reason: HOSPADM

## 2021-04-30 RX ORDER — ASPIRIN 325 MG
325 TABLET, DELAYED RELEASE (ENTERIC COATED) ORAL DAILY
Status: DISCONTINUED | OUTPATIENT
Start: 2021-04-30 | End: 2021-04-30 | Stop reason: HOSPADM

## 2021-04-30 RX ORDER — TEMAZEPAM 7.5 MG/1
7.5 CAPSULE ORAL NIGHTLY PRN
Status: DISCONTINUED | OUTPATIENT
Start: 2021-04-30 | End: 2021-04-30 | Stop reason: HOSPADM

## 2021-04-30 RX ORDER — ACETAMINOPHEN 325 MG/1
650 TABLET ORAL EVERY 4 HOURS PRN
Status: DISCONTINUED | OUTPATIENT
Start: 2021-04-30 | End: 2021-04-30 | Stop reason: HOSPADM

## 2021-04-30 RX ORDER — HYDROCODONE BITARTRATE AND ACETAMINOPHEN 5; 325 MG/1; MG/1
1 TABLET ORAL EVERY 4 HOURS PRN
Status: DISCONTINUED | OUTPATIENT
Start: 2021-04-30 | End: 2021-04-30 | Stop reason: HOSPADM

## 2021-04-30 RX ADMIN — ASPIRIN 325 MG: 325 TABLET, COATED ORAL at 08:27

## 2021-05-03 ENCOUNTER — TELEPHONE (OUTPATIENT)
Dept: CARDIOLOGY | Facility: CLINIC | Age: 81
End: 2021-05-03

## 2021-05-03 LAB
BH CV ECHO MEAS - ACS: 2.1 CM
BH CV ECHO MEAS - AO MAX PG: 5.6 MMHG
BH CV ECHO MEAS - AO MEAN PG: 3 MMHG
BH CV ECHO MEAS - AO ROOT AREA (BSA CORRECTED): 1.8
BH CV ECHO MEAS - AO ROOT AREA: 10.5 CM^2
BH CV ECHO MEAS - AO ROOT DIAM: 3.7 CM
BH CV ECHO MEAS - AO V2 MAX: 118 CM/SEC
BH CV ECHO MEAS - AO V2 MEAN: 82.3 CM/SEC
BH CV ECHO MEAS - AO V2 VTI: 30.9 CM
BH CV ECHO MEAS - BSA(HAYCOCK): 2.1 M^2
BH CV ECHO MEAS - BSA: 2 M^2
BH CV ECHO MEAS - BZI_BMI: 33.9 KILOGRAMS/M^2
BH CV ECHO MEAS - BZI_METRIC_HEIGHT: 167.6 CM
BH CV ECHO MEAS - BZI_METRIC_WEIGHT: 95.3 KG
BH CV ECHO MEAS - EDV(CUBED): 95.4 ML
BH CV ECHO MEAS - EDV(MOD-SP4): 72.4 ML
BH CV ECHO MEAS - EDV(TEICH): 95.9 ML
BH CV ECHO MEAS - EF(CUBED): 83.2 %
BH CV ECHO MEAS - EF(MOD-SP4): 58 %
BH CV ECHO MEAS - EF(TEICH): 76.2 %
BH CV ECHO MEAS - ESV(CUBED): 16 ML
BH CV ECHO MEAS - ESV(MOD-SP4): 30.4 ML
BH CV ECHO MEAS - ESV(TEICH): 22.8 ML
BH CV ECHO MEAS - FS: 44.9 %
BH CV ECHO MEAS - IVS/LVPW: 0.99
BH CV ECHO MEAS - IVSD: 1.3 CM
BH CV ECHO MEAS - LA DIMENSION: 4.2 CM
BH CV ECHO MEAS - LA/AO: 1.2
BH CV ECHO MEAS - LV DIASTOLIC VOL/BSA (35-75): 35.5 ML/M^2
BH CV ECHO MEAS - LV IVRT: 0.1 SEC
BH CV ECHO MEAS - LV MASS(C)D: 235.6 GRAMS
BH CV ECHO MEAS - LV MASS(C)DI: 115.4 GRAMS/M^2
BH CV ECHO MEAS - LV SYSTOLIC VOL/BSA (12-30): 14.9 ML/M^2
BH CV ECHO MEAS - LVIDD: 4.6 CM
BH CV ECHO MEAS - LVIDS: 2.5 CM
BH CV ECHO MEAS - LVLD AP4: 6.3 CM
BH CV ECHO MEAS - LVLS AP4: 5.6 CM
BH CV ECHO MEAS - LVOT AREA (M): 3.1 CM^2
BH CV ECHO MEAS - LVOT AREA: 3.1 CM^2
BH CV ECHO MEAS - LVOT DIAM: 2 CM
BH CV ECHO MEAS - LVPWD: 1.3 CM
BH CV ECHO MEAS - MV A MAX VEL: 91.2 CM/SEC
BH CV ECHO MEAS - MV DEC SLOPE: 440.5 CM/SEC^2
BH CV ECHO MEAS - MV E MAX VEL: 108 CM/SEC
BH CV ECHO MEAS - MV E/A: 1.2
BH CV ECHO MEAS - MV MAX PG: 6.9 MMHG
BH CV ECHO MEAS - MV MEAN PG: 3 MMHG
BH CV ECHO MEAS - MV P1/2T MAX VEL: 132 CM/SEC
BH CV ECHO MEAS - MV P1/2T: 87.8 MSEC
BH CV ECHO MEAS - MV V2 MAX: 131 CM/SEC
BH CV ECHO MEAS - MV V2 MEAN: 74.3 CM/SEC
BH CV ECHO MEAS - MV V2 VTI: 45.1 CM
BH CV ECHO MEAS - MVA P1/2T LCG: 1.7 CM^2
BH CV ECHO MEAS - MVA(P1/2T): 2.5 CM^2
BH CV ECHO MEAS - RAP SYSTOLE: 10 MMHG
BH CV ECHO MEAS - RVDD: 3.5 CM
BH CV ECHO MEAS - RVSP: 48.9 MMHG
BH CV ECHO MEAS - SI(AO): 158.3 ML/M^2
BH CV ECHO MEAS - SI(CUBED): 38.9 ML/M^2
BH CV ECHO MEAS - SI(MOD-SP4): 20.6 ML/M^2
BH CV ECHO MEAS - SI(TEICH): 35.8 ML/M^2
BH CV ECHO MEAS - SV(AO): 323.3 ML
BH CV ECHO MEAS - SV(CUBED): 79.4 ML
BH CV ECHO MEAS - SV(MOD-SP4): 42 ML
BH CV ECHO MEAS - SV(TEICH): 73.1 ML
BH CV ECHO MEAS - TR MAX VEL: 312 CM/SEC
MAXIMAL PREDICTED HEART RATE: 141 BPM
STRESS TARGET HR: 120 BPM

## 2021-05-03 NOTE — TELEPHONE ENCOUNTER
----- Message from DENIS Kingsley sent at 5/3/2021  7:21 AM EDT -----  Please advise patient.  Forwarded to Pulmonary as well.       Left mess for pt to return call regarding Echo results :    1.  LV size, function, and wall motion are normal.  Visually estimated ejection fraction is 50 to 55%.  3D ejection fraction is 51%.  Mild concentric left ventricular hypertrophy with grade 2 diastolic dysfunction and mild to moderate left atrial enlargement.  Mild right atrial enlargement.  The RV is at the upper limits of normal size, RV systolic function appears grossly preserved.  No septal defect or intracavitary mass or thrombus.     2.  There is early mitral annular calcification with normal leaflet architecture.  There is no mitral stenosis there is trivial to mild MR.  The aortic valve is minimally sclerotic but is not calcified nor stenotic.  There is trivial AI.  Mild tricuspid regurgitation.     3.  No pericardial effusion.  The proximal aortic root is at the upper limits of normal size with no dissection or aneurysm appreciated.     4.  Pulmonary artery systolic pressures are estimated in the mid to high 40s.    BLESSING Fischer

## 2021-05-04 ENCOUNTER — HOSPITAL ENCOUNTER (OUTPATIENT)
Dept: CARDIOLOGY | Facility: HOSPITAL | Age: 81
Discharge: HOME | End: 2021-05-04

## 2021-05-04 DIAGNOSIS — I65.23 BILATERAL CAROTID ARTERY STENOSIS: ICD-10-CM

## 2021-05-04 DIAGNOSIS — Z98.890 HISTORY OF AAA (ABDOMINAL AORTIC ANEURYSM) REPAIR: ICD-10-CM

## 2021-05-04 DIAGNOSIS — I71.40 ABDOMINAL AORTIC ANEURYSM, WITHOUT RUPTURE: ICD-10-CM

## 2021-05-04 DIAGNOSIS — R42 DIZZINESS: ICD-10-CM

## 2021-05-04 DIAGNOSIS — I73.9 PVD (PERIPHERAL VASCULAR DISEASE) WITH CLAUDICATION (HCC): ICD-10-CM

## 2021-05-04 PROCEDURE — 93880 EXTRACRANIAL BILAT STUDY: CPT | Performed by: INTERNAL MEDICINE

## 2021-05-04 PROCEDURE — 93979 VASCULAR STUDY: CPT | Performed by: INTERNAL MEDICINE

## 2021-05-04 PROCEDURE — 93925 LOWER EXTREMITY STUDY: CPT | Performed by: INTERNAL MEDICINE

## 2021-05-04 PROCEDURE — 93979 VASCULAR STUDY: CPT

## 2021-05-04 PROCEDURE — 93880 EXTRACRANIAL BILAT STUDY: CPT

## 2021-05-04 PROCEDURE — 93925 LOWER EXTREMITY STUDY: CPT

## 2021-05-16 LAB
ABDOMINAL DIST AORTA AP: 2.4 CM
ABDOMINAL DIST AORTA TRANS: 2.4 CM
ABDOMINAL LT COM ILIAC AP: 1.1 CM
ABDOMINAL LT COM ILIAC TRANS: 1 CM
ABDOMINAL LT COM ILIAC VEL: 109 CM/S
ABDOMINAL MID AORTA AP: 2.1 CM
ABDOMINAL MID AORTA TRANS: 2.1 CM
ABDOMINAL MID AORTA VEL: 89 CM/S
ABDOMINAL PROX AORTA AP: 2 CM
ABDOMINAL PROX AORTA TRANS: 2.1 CM
ABDOMINAL PROX AORTA VEL: 98 CM/S
ABDOMINAL RT COM ILIAC AP: 1 CM
ABDOMINAL RT COM ILIAC TRANS: 1 CM
ABDOMINAL RT COM ILIAC VEL: 143 CM/S
BH CV ECHO MEAS - BSA(HAYCOCK): 2.1 M^2
BH CV ECHO MEAS - BSA: 2 M^2
BH CV ECHO MEAS - BZI_BMI: 33.9 KILOGRAMS/M^2
BH CV ECHO MEAS - BZI_METRIC_HEIGHT: 167.6 CM
BH CV ECHO MEAS - BZI_METRIC_WEIGHT: 95.3 KG
BH CV ECHO MEAS - DIST AO DIAM: 2.4 CM
BH CV LEA LEFT ANT TIBIAL A DISTAL PSV: 40 CM/S
BH CV LEA LEFT CFA PROX PSV: 99 CM/S
BH CV LEA LEFT DFA PROX PSV: 61 CM/S
BH CV LEA LEFT POPITEAL A  PROX PSV: 162 CM/S
BH CV LEA LEFT PTA DISTAL PSV: 60 CM/S
BH CV LEA LEFT SFA DISTAL PSV: 57 CM/S
BH CV LEA LEFT SFA MID PSV: 58 CM/S
BH CV LEA LEFT SFA PROX PSV: 99 CM/S
BH CV LEA RIGHT ANT TIBIAL A DISTAL PSV: 26 CM/S
BH CV LEA RIGHT CFA PROX PSV: 124 CM/S
BH CV LEA RIGHT DFA PROX PSV: 64 CM/S
BH CV LEA RIGHT POPITEAL A  PROX PSV: 75 CM/S
BH CV LEA RIGHT PTA DISTAL PSV: 36 CM/S
BH CV LEA RIGHT SFA DISTAL PSV: 22 CM/S
BH CV LEA RIGHT SFA MID PSV: 51 CM/S
BH CV LEA RIGHT SFA PROX PSV: 39 CM/S
BH CV VAS SMA 1ST PP TIME: 15 MIN
BH CV VAS SMA 2ND PP TIME: 30 MIN
BH CV VAS SMA 3RD PP TIME: 45 MIN
BH CV XLRA MEAS LEFT BULB EDV: -16.5 CM/SEC
BH CV XLRA MEAS LEFT BULB PSV: -114.7 CM/SEC
BH CV XLRA MEAS LEFT CCA RATIO VEL: -62.4 CM/SEC
BH CV XLRA MEAS LEFT DIST CCA EDV: -12.2 CM/SEC
BH CV XLRA MEAS LEFT DIST CCA PSV: -62.9 CM/SEC
BH CV XLRA MEAS LEFT DIST ICA EDV: -27.2 CM/SEC
BH CV XLRA MEAS LEFT DIST ICA PSV: -93.6 CM/SEC
BH CV XLRA MEAS LEFT ICA RATIO VEL: 120 CM/SEC
BH CV XLRA MEAS LEFT ICA/CCA RATIO: -1.9
BH CV XLRA MEAS LEFT MID ICA EDV: -37 CM/SEC
BH CV XLRA MEAS LEFT MID ICA PSV: -95 CM/SEC
BH CV XLRA MEAS LEFT PROX CCA EDV: 16.2 CM/SEC
BH CV XLRA MEAS LEFT PROX CCA PSV: 77.3 CM/SEC
BH CV XLRA MEAS LEFT PROX ECA EDV: -16.7 CM/SEC
BH CV XLRA MEAS LEFT PROX ECA PSV: -173.8 CM/SEC
BH CV XLRA MEAS LEFT PROX ICA EDV: 37 CM/SEC
BH CV XLRA MEAS LEFT PROX ICA PSV: 120.8 CM/SEC
BH CV XLRA MEAS LEFT VERTEBRAL A EDV: 18.7 CM/SEC
BH CV XLRA MEAS LEFT VERTEBRAL A PSV: 46.7 CM/SEC
BH CV XLRA MEAS RIGHT BULB EDV: -13.8 CM/SEC
BH CV XLRA MEAS RIGHT BULB PSV: -71.2 CM/SEC
BH CV XLRA MEAS RIGHT CCA RATIO VEL: -61.2 CM/SEC
BH CV XLRA MEAS RIGHT DIST CCA EDV: -16 CM/SEC
BH CV XLRA MEAS RIGHT DIST CCA PSV: -61.8 CM/SEC
BH CV XLRA MEAS RIGHT DIST ICA EDV: -41.2 CM/SEC
BH CV XLRA MEAS RIGHT DIST ICA PSV: -108.2 CM/SEC
BH CV XLRA MEAS RIGHT ICA RATIO VEL: -108 CM/SEC
BH CV XLRA MEAS RIGHT ICA/CCA RATIO: 1.8
BH CV XLRA MEAS RIGHT MID ICA EDV: -27.9 CM/SEC
BH CV XLRA MEAS RIGHT MID ICA PSV: -88 CM/SEC
BH CV XLRA MEAS RIGHT PROX CCA EDV: -18.1 CM/SEC
BH CV XLRA MEAS RIGHT PROX CCA PSV: -99 CM/SEC
BH CV XLRA MEAS RIGHT PROX ECA EDV: -18.2 CM/SEC
BH CV XLRA MEAS RIGHT PROX ECA PSV: -143.2 CM/SEC
BH CV XLRA MEAS RIGHT PROX ICA EDV: -29.3 CM/SEC
BH CV XLRA MEAS RIGHT PROX ICA PSV: -92.9 CM/SEC
BH CV XLRA MEAS RIGHT VERTEBRAL A EDV: -14 CM/SEC
BH CV XLRA MEAS RIGHT VERTEBRAL A PSV: -48.9 CM/SEC
DIST ATA PSV LEFT: 40.5 CM/SEC
DIST ATA PSV RIGHT: 26.7 CM/SEC
DIST PTA PSV LEFT: 60.9 CM/SEC
DIST PTA PSV RIGHT: 36.5 CM/SEC
DIST SFA PSV LEFT: -57.3 CM/SEC
DIST SFA PSV RIGHT: -22.1 CM/SEC
LEFT CFA PROX SYS PSV: 99.2 CM/SEC
MAXIMAL PREDICTED HEART RATE: 141 BPM
MID SFA PSV RIGHT: -51 CM/SEC
PROX PFA PSV LEFT: -61.9 CM/SEC
PROX PFA PSV RIGHT: -64.1 CM/SEC
PROX SFA PSV LEFT: -99.3 CM/SEC
PROX SFA PSV RIGHT: -39.1 CM/SEC
RIGHT CFA PROX SYS PSV: 124.9 CM/SEC
STRESS TARGET HR: 120 BPM

## 2021-05-17 ENCOUNTER — TELEPHONE (OUTPATIENT)
Dept: CARDIOLOGY | Facility: CLINIC | Age: 81
End: 2021-05-17

## 2021-05-17 DIAGNOSIS — R94.39 ABNORMAL STRESS TEST: ICD-10-CM

## 2021-05-17 DIAGNOSIS — R07.2 PRECORDIAL PAIN: ICD-10-CM

## 2021-05-17 DIAGNOSIS — I73.9 CLAUDICATION (HCC): ICD-10-CM

## 2021-05-17 DIAGNOSIS — R06.02 SHORTNESS OF BREATH: Primary | ICD-10-CM

## 2021-05-17 DIAGNOSIS — R00.2 PALPITATIONS: ICD-10-CM

## 2021-05-17 DIAGNOSIS — R09.89 DIMINISHED PULSES IN LOWER EXTREMITY: ICD-10-CM

## 2021-05-17 DIAGNOSIS — R09.89 CAROTID BRUIT, UNSPECIFIED LATERALITY: ICD-10-CM

## 2021-05-17 DIAGNOSIS — I10 ESSENTIAL HYPERTENSION: ICD-10-CM

## 2021-05-17 DIAGNOSIS — I25.10 CAD, MULTIPLE VESSEL: ICD-10-CM

## 2021-05-17 DIAGNOSIS — Z98.890 HISTORY OF AAA (ABDOMINAL AORTIC ANEURYSM) REPAIR: ICD-10-CM

## 2021-05-17 NOTE — TELEPHONE ENCOUNTER
I just signed her monitor today and Dennis still needs to sign it that is why not scanned in yet, but she did not have any irregular rhythms. If you want to let them know.

## 2021-05-17 NOTE — TELEPHONE ENCOUNTER
Pt  will return call once he gets home to go over test results, Pt will also need CTA of the ABD with Run Off pt will also need BMP prior .       Duplex Carotid U/S :     1.  Both common carotid arteries are widely patent and are without significant atherosclerotic involvement.     2.  Mild bifurcation disease bilaterally with no flow limitation by echo or Doppler sampling.     3.  16 to 49% stenosis by Doppler in the distal right internal carotid artery and proximal and mid left internal carotid artery.  Mild atheroma is identified somewhat more prominent on the left.     4.  Antegrade flow in both vertebral arteries.     Summary: Nonobstructive carotid disease bilaterally as above.  Antegrade flow both vertebral arteries.      Abd. Aorta U/S results :     Significantly technically limited study.  Only gross generalizations can be rendered.     1.  There appears to be no residual aneurysmal dilation of the aorta with maximal dimension of 2.4 cm in the distal abdomen.     2.  No large perivascular clear space to suggest endoleak or rupture.   3.  The proximal iliac arteries appear patent.    Duplex U/S of lower Ext;    1.  50 to 75% stenosis  in the proximal right common femoral artery by echo, with 50% or greater stenosis by Doppler sampling.  No obstructive disease is identified in the left common femoral artery.     2.  Doppler excludes significant obstruction in the right superficial femoral artery which is not well visualized.  Dopplers compatible with 50 to 99% stenosis in the distal left superficial femoral artery extending into the left popliteal artery.     3.  The right popliteal artery and distal vessels bilaterally are patent and are without high-grade obstruction.

## 2021-05-17 NOTE — TELEPHONE ENCOUNTER
Called and advised of test results ,  ask for monitor results which we could not find in chart. He was advised that we would try and get results . Pt was advised that someone would call them with  Apt date and time for CTA of Abd. with Run off pt will need BMP prior

## 2021-05-17 NOTE — TELEPHONE ENCOUNTER
----- Message from Kayla Acosta sent at 5/17/2021  9:44 AM EDT -----      ----- Message -----  From: Rekha Russo APRN  Sent: 5/16/2021   6:53 PM EDT  To: Kayla Acosta    Please advise patient.  On ASA and statin.

## 2021-06-03 ENCOUNTER — TELEPHONE (OUTPATIENT)
Dept: CARDIOLOGY | Facility: CLINIC | Age: 81
End: 2021-06-03

## 2021-06-03 DIAGNOSIS — I70.1 RENAL ARTERY STENOSIS (HCC): Primary | ICD-10-CM

## 2021-06-03 DIAGNOSIS — R93.89 ABNORMAL CT SCAN: ICD-10-CM

## 2021-06-03 DIAGNOSIS — I73.9 PVD (PERIPHERAL VASCULAR DISEASE) WITH CLAUDICATION (HCC): ICD-10-CM

## 2021-06-03 DIAGNOSIS — K55.1 MESENTERIC ARTERY STENOSIS (HCC): ICD-10-CM

## 2021-06-03 NOTE — TELEPHONE ENCOUNTER
Venu LVM stating he needs more info.       Venu presented in office requesting to speak w/ someone.       I went over results w/ him in more depth. He stated that he has medical family members that will have questions. I stated that we can have him fill out a form for him to have result printed and they can look at it.   Alison assisted pt with completing form and gave him result.

## 2021-06-03 NOTE — TELEPHONE ENCOUNTER
----- Message from DENIS Kingsley sent at 6/3/2021 11:24 AM EDT -----  Please advise patient.  She needs to be seen for renal artery stenosis and PVD.  Is she ok with going to Dr. Doe if so put referral in.  Advise her to get copy of CT from hospital on CD and take with her to appt.  Please make sure diagnosis is renal artery stenosis, renal artery infarct and PVD, abnormal CT.  Thanks! She is on ASA, Plavix and statin.

## 2021-06-03 NOTE — TELEPHONE ENCOUNTER
CTA Abd shows multiple areas of significant stenosis and an area that's occluded.       Called and informed Venu of results and the message from Rekha, she verbalized understanding.

## 2021-06-07 ENCOUNTER — OFFICE VISIT (OUTPATIENT)
Dept: CARDIOLOGY | Facility: CLINIC | Age: 81
End: 2021-06-07

## 2021-06-07 VITALS
BODY MASS INDEX: 33.43 KG/M2 | WEIGHT: 208 LBS | OXYGEN SATURATION: 95 % | TEMPERATURE: 97.2 F | HEART RATE: 67 BPM | HEIGHT: 66 IN | DIASTOLIC BLOOD PRESSURE: 74 MMHG | SYSTOLIC BLOOD PRESSURE: 148 MMHG

## 2021-06-07 DIAGNOSIS — I25.10 CAD, MULTIPLE VESSEL: Primary | ICD-10-CM

## 2021-06-07 DIAGNOSIS — E78.5 HYPERLIPIDEMIA, UNSPECIFIED HYPERLIPIDEMIA TYPE: ICD-10-CM

## 2021-06-07 DIAGNOSIS — Z98.890 HISTORY OF AAA (ABDOMINAL AORTIC ANEURYSM) REPAIR: ICD-10-CM

## 2021-06-07 DIAGNOSIS — R06.02 SHORTNESS OF BREATH: ICD-10-CM

## 2021-06-07 DIAGNOSIS — I10 ESSENTIAL HYPERTENSION: ICD-10-CM

## 2021-06-07 DIAGNOSIS — I70.1 RENAL ARTERY STENOSIS (HCC): ICD-10-CM

## 2021-06-07 DIAGNOSIS — R07.89 OTHER CHEST PAIN: ICD-10-CM

## 2021-06-07 DIAGNOSIS — R60.9 PERIPHERAL EDEMA: ICD-10-CM

## 2021-06-07 DIAGNOSIS — I65.23 BILATERAL CAROTID ARTERY STENOSIS: ICD-10-CM

## 2021-06-07 DIAGNOSIS — I73.9 PVD (PERIPHERAL VASCULAR DISEASE) WITH CLAUDICATION (HCC): ICD-10-CM

## 2021-06-07 DIAGNOSIS — K55.1 MESENTERIC ARTERY STENOSIS (HCC): ICD-10-CM

## 2021-06-07 DIAGNOSIS — I27.20 PULMONARY HYPERTENSION (HCC): ICD-10-CM

## 2021-06-07 PROCEDURE — 99214 OFFICE O/P EST MOD 30 MIN: CPT | Performed by: NURSE PRACTITIONER

## 2021-06-07 NOTE — PROGRESS NOTES
Subjective   Marissa Carey is a 80 y.o. female     Chief Complaint   Patient presents with   • Follow-up   • Abnormal Stress Test       HPI    Problem List:    1) Coronary Artery Disease   a. Cath in 2005 by Dr. Galloway revealing multivessel coronary artery disease s/p CABG by Dr. Soto with SHEN   to first diagonal, SVG to OM, SVG to PDA   b. Cath in 2012 revealing triple vessel disease with LIMA to LAD occluded with collaterals from RCA to LAD, with other grafts patents and medical management recommended    c. Cath 7/23/15 - Stent RCA   d  Left heart cath 11/26/19-right coronary artery was diffusely diseased throughout its length and the posterior descending coronary artery was totally occluded, seen at the junction of proximal thirds of the vessels widely patent no hemodynamic significant stenosis were identified beyond that level except the occluded PDA, the circumflex was occluded at the junction of its proximal portions, LAD was occluded after 2 small proximal diagonal as it was diffusely diseased prior to that level of occlusion, vein graft to the PDA was patent, vein graft to the distal circumflex was patent, bifurcated obtuse marginal which was diffusely diseased beyond the graft touchdown site which was without discrete high-grade stenosis, there was retrograde filled to the level of the body of the circumflex there after intragraft nitroglycerin there was some improvement in caliber of the graft to the OM, subclavian artery graft was performed and inability to pass the catheter beyond the proximal left subclavian, adequate opacification did demonstrate a string sign and LIMA bypass graft, EF 50 to 55%, LVEDP 20-22  E.  Stress test 4/23/2021-small inferoapical and a large inferolateral wall ischemic defect, post-rest EF 59%, borderline transischemic dilation ratio of 1.18  F.  Left heart cath 4/30/2021-RCA 30% plaque mid vessel and distal 60% plaque the PDA is 100% occluded; SVG to obtuse patent, SVG  to PDA widely patent with good collateral flow to LAD, LIMA to LAD nonfunctional  2) Hypertension  2.1) Echo 10/4/16 - mild LVH; EF 60-65%; DD II; early MAC, mild MR, TR and AL; PA 20-25   2.2) echo 10/28/19-borderline LVH, diastolic dysfunction 2, trivial to mild MR, mild TR, PA in the 40s, EF 66 to 70%  3) Dyslipidemia  4) Obesity BMI 37  5) Cerebrovascular disease   a. Hx of TIA with workup in 2008 which was negative  6) Peripheral Vascular disease    a. SHABBIR - < 20% MARIAN; < 50% LICA   b. SHABBIR 10/4/16 - 16-49% MARIAN and LICA and bifurcation; antegrade flow   C.  Carotid artery ultrasound 5/4/2020 % stenosis of the distal right internal artery and mid left internal carotid artery, antegrade flow both vertebral arteries  7) Osteoarthritis  8) GERD  9) Raynaud's disease.  10) Anxiety/Depression  11) Event Monitor 7/7-7/20/15 - SB - NSR with PVCs; 4/29-5/12/2021-normal sinus rhythm      12)  AAA w/repair 1997        A. Abdominal US 7/24/14 - 2.4 CM        B. Abdominal ultrasound 10/20/17-previous aneurysm repair, graft is patent, mid aorta measures up to 2.4 cm        C.  Abdominal ultrasound 5/7/19-no evidence of persistent abdominal aortic aneurysm with maximal AP diameter of 2 cm, no evidence of significant aortic stenosis, iliac arteries are patent (Repeat every 2 yrs)        D.  Abdominal aorta ultrasound 5/4/2021-appears to be no residual aneurysmal dilation of the aorta with maximum dimension of 2.4 cm, no large perivascular clear space to suggest endoleak or rupture, repeat in 2 years      13) postprandial hypotension     14) Pfizer vaccination 2/13/2021; 3/13/2021      15)  Emphysema, Dr. Moreno        16) PVD       16.1) BLE arterial duplex at [facility]. 5-28-15 mild atherosclerotic change in BLE, no sign. plaque or stenoses       16.2) bilateral lower extremity arterial ultrasound 5/4/2021-50 to 75% stenosis proximal right common femoral artery, 50% greater stenosis by Doppler sampling, 50 to 99%  stenosis in the distal left superficial femoral artery extending into the left popliteal artery potentially hemodynamic significant stenosis in the right common femoral artery and distal left superficial femoral artery      16.3)  CTA of abd with run off 5/28/2021 -greater than 80% stenosis in the renal arteries bilaterally, less than 30% stenosis celiac artery, 50% stenosis of the inferior mesenteric artery, occluded inferior mesenteric artery, 50% stenosis of the internal iliac arteries bilaterally, 70% stenosis common femoral artery on the right, less than 30% stenosis deep femoral artery, 30% stenosis of the superficial femoral artery, greater than 90% stenosis of the popliteal, left has less than 30% stenosis the common femoral, deep femoral artery, 30% of the superficial femoral artery, 50% of the popliteal artery, infarct in the lower pole of the left kidney, moderate hiatal hernia        17.)  Renal artery stenosis by CT 5/28/2021        18.)  Moderate hiatal hernia by CT 5/28/2021    Patient is a 80-year-old female who presents today for follow-up on testing with her  at her side.  She still has chest pressure left anterior that pretty much is when she exerts herself.  However per 's report she will actually get short of breath first and then she will have chest pressure.  When she stops and rest it will go away and she does not take nitroglycerin.  She is going to see pulmonary and they agreed to do further work-up.  She denies any palpitations, fluttering, presyncope, syncope, orthopnea or PND.  Patient says that she does have some dizziness but she feels like her balance is off.  She also has swelling in her legs and will use her water pill as needed.  Patient says that she has shortness of breath again with any activity.  She is fatigued a lot.    We went over left heart cath and all of her other testing.  We have referred her to Dr. Doe due to PVD, mesenteric artery stenosis and  renal artery stenosis.     Current Outpatient Medications on File Prior to Visit   Medication Sig Dispense Refill   • amLODIPine (NORVASC) 2.5 MG tablet Take 2.5 mg by mouth Every Night.     • aspirin 81 MG tablet Take 81 mg by mouth Every Night.     • carvedilol (COREG) 6.25 MG tablet Take one table by mouth in AM if SBP > 130 and take one tablet by mouth in PM if SBP > 130 (Patient taking differently: Take 6.25 mg by mouth Every Night.) 60 tablet 5   • clopidogrel (PLAVIX) 75 MG tablet Take 75 mg by mouth Every Night.     • Docusate Calcium (STOOL SOFTENER PO) Take 1 tablet by mouth Daily As Needed.     • Ferrous Gluconate (IRON 27 PO) Take 65 mg by mouth Every Other Day.     • furosemide (LASIX) 20 MG tablet Take 20 mg by mouth Daily As Needed.     • isosorbide mononitrate (IMDUR) 30 MG 24 hr tablet Take 1 tablet by mouth Daily. (Patient taking differently: Take 30 mg by mouth Every Night.) 90 tablet 3   • LINZESS 145 MCG capsule capsule Take 145 mcg by mouth As Needed.     • memantine (NAMENDA XR) 7 MG capsule sustained-release 24 hr extended release capsule Take 28 mg by mouth Every Morning.     • nitroglycerin (NITROSTAT) 0.4 MG SL tablet Place 0.4 mg under the tongue Every 5 (Five) Minutes As Needed. Place 1 tablet under the tongue every 5 minutes for up to doses as needed for chest pain . Call 911 if pain persists     • Omega-3 Fatty Acids (FISH OIL) 1200 MG capsule capsule Take 1,200 mg by mouth 2 (Two) Times a Day With Meals. With 360 mg omega 3 bid     • ranolazine (RANEXA) 500 MG 12 hr tablet Take 1 tablet by mouth every 12 (twelve) hours.     • rosuvastatin (CRESTOR) 20 MG tablet Take 20 mg by mouth Every Night.     • sertraline (ZOLOFT) 50 MG tablet Take 50 mg by mouth Every Night.     • TOVIAZ 4 MG tablet sustained-release 24 hour tablet Take 4 mg by mouth Daily.     • [DISCONTINUED] HYDROcodone-acetaminophen (NORCO) 7.5-325 MG per tablet Take 1 tablet by mouth Every 6 (Six) Hours As Needed. prn        No current facility-administered medications on file prior to visit.       ALLERGIES    Morphine    Past Medical History:   Diagnosis Date   • Aortic aneurysm (CMS/HCC)    • Bilateral foot pain    • CAD, multiple vessel    • Carotid bruit    • Chest pain    • Claudication (CMS/HCC)    • COVID-19 vaccine administered 02/13/2021 03/13/2021 - Pfzier    • COVID-19 vaccine administered 02/13/2021 03/13/2021 - Pzfier    • Deviated septum    • Diminished pulses in lower extremity    • Edema    • Former smoker    • GERD (gastroesophageal reflux disease)    • Hyperlipidemia    • Hypertension    • Neuropathy    • Palpitations    • Pulmonary hypertension (CMS/HCC)    • TIA (transient ischemic attack)    • Tinnitus    • Vision impairment        Social History     Socioeconomic History   • Marital status:      Spouse name: Not on file   • Number of children: Not on file   • Years of education: Not on file   • Highest education level: Not on file   Tobacco Use   • Smoking status: Former Smoker   • Smokeless tobacco: Never Used   Substance and Sexual Activity   • Alcohol use: No   • Drug use: No   • Sexual activity: Defer       Family History   Problem Relation Age of Onset   • Other Mother         acute myocardial infarction   • Aneurysm Mother    • Goiter Mother    • Aneurysm Father    • Sudden death Father    • Heart failure Sister    • Other Other         leukemia   • Cancer Other         thyroid       Review of Systems   Constitutional: Positive for appetite change (decreased appetite ) and fatigue (tired alot ). Negative for chills, diaphoresis and fever.   HENT: Negative for congestion, rhinorrhea and sore throat.    Eyes: Positive for visual disturbance (glasses).   Respiratory: Positive for cough (dry due to allergies), chest tightness (pressure in the center of chest or feels heavy ) and shortness of breath (walking at any distance ). Negative for wheezing.    Cardiovascular: Positive for chest pain  "(left pressure mostly with activity; will get short of breath; sits and rests and goes away; no nitro ) and leg swelling (right foot ( worse) legs, feet and ankles; just use as needed). Negative for palpitations.   Gastrointestinal: Positive for abdominal pain (lower abdominal pain ) and constipation (pt has medication to help ). Negative for blood in stool, diarrhea, nausea and vomiting.   Endocrine: Negative for cold intolerance and heat intolerance.   Genitourinary: Positive for frequency (when she take the water pill). Negative for decreased urine volume, difficulty urinating, dysuria and hematuria.   Musculoskeletal: Positive for arthralgias (all over back ,right hand , both hips ), back pain (entire back ) and joint swelling (fingers, wrist , knees ). Negative for neck pain and neck stiffness.   Skin: Positive for rash (yeast inf. on her chest). Negative for color change, pallor and wound.   Allergic/Immunologic: Positive for environmental allergies (seasonal ). Negative for food allergies.   Neurological: Positive for dizziness (her balance is not good ), weakness (legs, hands; losing strength in right leg), numbness (legs ) and headaches (font of her head and right side of her necka round her ear ). Negative for light-headedness.   Hematological: Does not bruise/bleed easily.   Psychiatric/Behavioral: Negative for sleep disturbance.       Objective   /74 (BP Location: Left arm)   Pulse 67   Temp 97.2 °F (36.2 °C)   Ht 167.6 cm (66\")   Wt 94.3 kg (208 lb)   SpO2 95%   BMI 33.57 kg/m²   Vitals:    06/07/21 1401 06/07/21 1414   BP: 179/95 148/74   BP Location: Right arm Left arm   Pulse: 67    Temp: 97.2 °F (36.2 °C)    SpO2: 95%    Weight: 94.3 kg (208 lb)    Height: 167.6 cm (66\")       Lab Results (most recent)     None        Physical Exam  Vitals reviewed.   Constitutional:       General: She is awake.      Appearance: Normal appearance. She is well-developed and well-groomed. She is obese. "   HENT:      Head: Normocephalic.   Eyes:      General: Lids are normal.      Comments: Wears glasses   Neck:      Vascular: No carotid bruit, hepatojugular reflux or JVD.   Cardiovascular:      Rate and Rhythm: Normal rate and regular rhythm.      Pulses:           Radial pulses are 2+ on the right side and 2+ on the left side.        Dorsalis pedis pulses are 2+ on the left side.        Posterior tibial pulses are 2+ on the left side.      Heart sounds: Normal heart sounds.      Comments: Decreased pedal pulses right lower extremity  Pulmonary:      Effort: Pulmonary effort is normal.      Breath sounds: Normal breath sounds and air entry.   Abdominal:      General: Bowel sounds are normal.      Palpations: Abdomen is soft.   Musculoskeletal:      Right lower leg: No edema.      Left lower leg: No edema.   Skin:     General: Skin is warm and dry.   Neurological:      Mental Status: She is alert and oriented to person, place, and time.   Psychiatric:         Attention and Perception: Attention and perception normal.         Mood and Affect: Mood and affect normal.         Speech: Speech normal.         Behavior: Behavior normal. Behavior is cooperative.         Thought Content: Thought content normal.         Cognition and Memory: Memory is impaired.         Judgment: Judgment normal.         Procedure   Procedures         Assessment/Plan      Diagnosis Plan   1. CAD, multiple vessel     2. Bilateral carotid artery stenosis     3. History of AAA (abdominal aortic aneurysm) repair     4. Hyperlipidemia, unspecified hyperlipidemia type     5. Essential hypertension     6. Pulmonary hypertension (CMS/HCC)     7. Shortness of breath     8. Peripheral edema     9. PVD (peripheral vascular disease) with claudication (CMS/HCC)     10. Renal artery stenosis (CMS/HCC)     11. Other chest pain     12. Mesenteric artery stenosis (CMS/HCC)         Return in about 4 months (around 10/7/2021).    CAD-patient's on aspirin, beta,  Plavix and statin.  Bilateral carotid artery disease-patient's on aspirin and statin.  History of AAA-stable.  Hyperlipidemia-patient is on Crestor and doing well.  Hypertension-patient's on amlodipine and carvedilol and doing well.  Pulmonary hypertension/shortness of breath-patient follows Dr. Moreno.  Peripheral edema/diastolic dysfunction-patient is on Lasix.  PVD-patient's on aspirin and statin.  Renal artery stenosis-patient's on aspirin and statin.  Mesenteric artery stenosis-patient's on aspirin and statin.  Chest pain-patient recently had a heart cath.  It sounds like her chest discomfort is more related to her shortness of breath.  She is on Ranexa and Imdur.  She will continue her medication regimen.  She will follow-up in 4 months or sooner if any changes.    Mesenteric artery stenosis/renal artery stenosis/PVD-patient is were been referred to Dr. Doe.       Marissa Carey                 Advance Care Planning   ACP discussion was declined by the patient. Patient has an advance directive (not in EMR), copy requested.  Electronically signed by:

## 2021-06-07 NOTE — PATIENT INSTRUCTIONS
Advance Care Planning and Advance Directives     You make decisions on a daily basis - decisions about where you want to live, your career, your home, your life. Perhaps one of the most important decisions you face is your choice for future medical care. Take time to talk with your family and your healthcare team and start planning today.  Advance Care Planning is a process that can help you:  · Understand possible future healthcare decisions in light of your own experiences  · Reflect on those decision in light of your goals and values  · Discuss your decisions with those closest to you and the healthcare professionals that care for you  · Make a plan by creating a document that reflects your wishes    Surrogate Decision Maker  In the event of a medical emergency, which has left you unable to communicate or to make your own decisions, you would need someone to make decisions for you.  It is important to discuss your preferences for medical treatment with this person while you are in good health.     Qualities of a surrogate decision maker:  • Willing to take on this role and responsibility  • Knows what you want for future medical care  • Willing to follow your wishes even if they don't agree with them  • Able to make difficult medical decisions under stressful circumstances    Advance Directives  These are legal documents you can create that will guide your healthcare team and decision maker(s) when needed. These documents can be stored in the electronic medical record.    · Living Will - a legal document to guide your care if you have a terminal condition or a serious illness and are unable to communicate. States vary by statute in document names/types, but most forms may include one or more of the following:        -  Directions regarding life-prolonging treatments        -  Directions regarding artificially provided nutrition/hydration        -  Choosing a healthcare decision maker        -  Direction  regarding organ/tissue donation    · Durable Power of  for Healthcare - this document names an -in-fact to make medical decisions for you, but it may also allow this person to make personal and financial decisions for you. Please seek the advice of an  if you need this type of document.    **Advance Directives are not required and no one may discriminate against you if you do not sign one.    Medical Orders  Many states allow specific forms/orders signed by your physician to record your wishes for medical treatment in your current state of health. This form, signed in personal communication with your physician, addresses resuscitation and other medical interventions that you may or may not want.      For more information or to schedule a time with a [facility] Advance Care Planning Facilitator contact: [email or web address]  Peripheral Vascular Disease    Peripheral vascular disease (PVD) is a disease of the blood vessels. PVD may also be called peripheral artery disease (PAD) or poor circulation. In most cases, PVD narrows the blood vessels that carry blood from the heart to the rest of the body. This can reduce the supply of blood to the arms, legs, and internal organs, such as the stomach or kidneys. However, PVD most often affects a person's lower legs and feet. Without treatment, PVD tends to get worse.  PVD can lead to acute limb ischemia. This happens when an arm or leg cannot get enough blood all of a sudden. This is a medical emergency.  What are the causes?  Each type of PVD has different causes. The most common cause is a buildup of a fatty substance (plaque) inside your arteries. Small amounts of plaque can break off from the walls of the arteries. The plaque may get stuck in a smaller artery. This blocks blood flow and can cause acute limb ischemia.  Other common causes of PVD include:  · Blood clots that form inside the blood vessels.  · Injuries to blood vessels.  · Diseases  that cause irritation and swelling (inflammation) of blood vessels.  · Diseases that cause blood vessel to tighten (spasms).  What increases the risk?  The following factors may make you more likely to develop this condition:  · A family history of PVD.  · Common medical conditions, including:  ? High cholesterol.  ? Diabetes.  ? High blood pressure (hypertension).  ? Heart disease.  ? Past problems with blood clots.  ? Past injury, such as burns or a broken bone.  · Other medical conditions, such as:  ? Buerger's disease. This is caused by swollen or irritated blood vessels in your hands and feet.  ? Some forms of arthritis.  ? Birth defects that affect the arteries in your legs.  ? Kidney disease.  · Using tobacco or nicotine products.  · Not getting enough exercise.  · Being very overweight (obese).  · Being 50 years old or older.  What are the signs or symptoms?  This condition may cause different symptoms. Your symptoms depend on what part of your body is not getting enough blood. Some common signs and symptoms include:  · Cramps in your lower legs.  · Pain and weakness in your legs when you are active that goes away when you rest.  · Leg pain when at rest.  · Leg numbness, tingling, or weakness.  · Coldness in a leg or foot, especially when compared with the other leg or foot.  · Skin or hair changes. These can include:  ? Hair loss.  ? Shiny skin.  ? Pale or bluish skin.  ? Thick toenails.  · Being unable to get or keep an erection.  · Tiredness (fatigue).  People with PVD are more likely to get open wounds and sores on their toes, feet, or legs. The wounds or sores may take longer than normal to heal.  How is this treated?  Treatment for PVD depends on what caused it, how bad your symptoms are, and your age. Underlying causes need to be treated and controlled. These include long-term (chronic) conditions, such as diabetes, high cholesterol, and high blood pressure. Treatment may include:  · Lifestyle  changes, such as:  ? Quitting tobacco use.  ? Getting regular exercise.  ? Having a diet low in fat and cholesterol.  ? Not drinking alcohol.  ? Taking medicines, such as:  § Blood thinners to prevent blood clots.  § Medicines to improve blood flow.  § Medicines to improve your blood cholesterol levels.  · Procedures, such as:  ? Angioplasty. This uses a filled balloon to open a blocked artery and improve blood flow.  ? Stent implant. This inserts a small mesh tube to keep a blocked artery open.  ? Peripheral bypass surgery. This reroutes blood flow around a blocked artery.  ? Surgery to take dead tissue off an infected wound on the affected limb.  ? Amputation. This is taking away the affected limb through surgery. It may be needed if acute limb ischemia occurs and other treatments have not helped.  Follow these instructions at home:  Medicines  · Take over-the-counter and prescription medicines only as told by your doctor.  · If you are taking blood thinners:  ? Talk with your doctor before you take any medicines that have aspirin, or NSAIDs, such as ibuprofen, in them.  ? Take your medicine exactly as told by your doctor. Take it at the same time each day.  ? Avoid activities that could hurt or bruise you. Follow instructions about how to prevent falls.  ? Wear a bracelet that says you are taking blood thinners. Or, carry a card that lists what medicines you take.  Lifestyle         · Do not use any products that contain nicotine or tobacco, such as cigarettes, e-cigarettes, and chewing tobacco. If you need help quitting, ask your doctor.  · Talk with your doctor about keeping a healthy weight. If needed, ask about losing weight.  · Eat a diet that is low in fat and cholesterol. If you need help, talk with your doctor.  · Do not drink alcohol.  · Get regular exercise. Ask your doctor about how to stay active.  General instructions  · Take good care of your feet. To do this:  ? Wear shoes that fit well and feel  "good.  ? Check your feet often for any cuts or sores.  · Keep all follow-up visits as told by your doctor. This is important.  Where to find more information  · Society for Vascular Surgery: vascular.org  · American Heart Association: heart.org  · National Heart, Lung, and Blood [place]: nhlbi.nih.gov  Contact a doctor if:  · You have cramps in your legs when you walk.  · You have leg pain when you rest.  · Your leg or foot feels cold.  · Your skin changes.  · You cannot get or keep an erection.  · You have cuts or sores on your feet that do not heal.  Get help right away if:  · You have sudden changes in the color and feeling of your arms or legs, such as:  ? Your arm or leg turns cold, numb, and blue.  ? Your arm or leg become red, warm, swollen, painful, or numb.  · You have any signs of a stroke. \"BE FAST\" is an easy way to remember the main warning signs:  ? B - Balance. Signs are dizziness, sudden trouble walking, or loss of balance.  ? E - Eyes. Signs are trouble seeing or a change in how you see.  ? F - Face. Signs are sudden weakness or loss of feeling of the face, or the face or eyelid drooping on one side.  ? A - Arms. Signs are weakness or loss of feeling in an arm. This happens suddenly and usually on one side of the body.  ? S - Speech. Signs are sudden trouble speaking, slurred speech, or trouble understanding what people say.  ? T - Time. Time to call emergency services. Write down what time symptoms started.  · You have other signs of a stroke, such as:  ? A sudden, very bad headache with no known cause.  ? Feeling like you may vomit (nausea).  ? Vomiting.  ? A seizure.  · You have chest pain or trouble breathing.  These symptoms may be an emergency. Do not wait to see if the symptoms will go away. Get medical help right away. Call your local emergency services (911 in the [place] Do not drive yourself to the hospital.  Summary  · Peripheral vascular disease (PVD) is a disease of the blood " vessels.  · In most cases, PVD narrows the blood vessels that carry blood from your heart to the rest of your body.  · PVD may cause different symptoms. Your symptoms depend on what part of your body is not getting enough blood.  · Treatment depends on what caused it, how bad your symptoms are, and your age.  This information is not intended to replace advice given to you by your health care provider. Make sure you discuss any questions you have with your health care provider.  Document Revised: 09/29/2020 Document Reviewed: 09/29/2020  Elsevier Patient Education © 2021 [employer]    Renal Artery Stenosis  Renal artery stenosis (CHARLEEN) is a narrowing of the artery that carries blood to the kidneys. It can affect one or both kidneys.  The kidneys filter waste and extra fluid from the blood. Waste and fluid are then removed when a person passes urine. The kidneys also make an important chemical messenger (hormone) called renin. Renin helps regulate blood pressure. The first sign of CHARLEEN may be high blood pressure. Other symptoms can develop over time.  What are the causes?  A common cause of this condition is plaque buildup in your arteries (atherosclerosis). The plaques that cause this are made up of:  · Fat.  · Cholesterol.  · Calcium.  · Other substances.  As these substances build up in your renal artery, the blood supply to your kidneys slows. The lack of blood and oxygen causes the signs and symptoms of CHARLEEN.  A much less common cause of CHARLEEN is a disease called fibromuscular dysplasia. This disease causes abnormal cell growth that narrows the renal artery. It is not related to atherosclerosis. It occurs mostly in women who are 25-50 years old. It may be passed down through families.    What increases the risk?  You are more likely to develop this condition if you:  · Are a man who is at least 45 years old.  · Are a woman who is at least 55 years old.  · Have high blood pressure.  · Have high cholesterol.  · Are a  smoker.  · Abuse alcohol.  · Have diabetes or prediabetes.  · Are overweight or obese.  · Have a family history of early heart disease.  What are the signs or symptoms?  CHARLEEN usually develops slowly. You may not have any signs or symptoms at first. Early signs may include:  · Development of high blood pressure.  · A sudden increase in existing high blood pressure.  · No longer responding to medicine that used to control your blood pressure.  Later signs and symptoms are due to kidney damage. They may include:  · Feeling tired (fatigue).  · Shortness of breath.  · Swollen legs and feet.  · Dry skin.  · Headaches.  · Muscle cramps.  · Loss of appetite.  · Nausea or vomiting.  How is this diagnosed?  This condition may be diagnosed based on:  · Your symptoms and medical history. Your health care provider may suspect CHARLEEN based on changes in your blood pressure and your risk factors.  · A physical exam. During the exam, your health care provider will use a stethoscope to listen for a whooshing sound (bruit) that can occur where the renal artery is blocking blood flow.  · Various tests. These may include:  ? Blood and urine tests to check your kidney function.  ? Imaging tests of your kidneys, such as:  § A test that uses sound waves to create an image of your kidneys and the blood flow to your kidneys (ultrasound).  § A test in which dye is injected into one of your blood vessels so images can be taken as the dye flows through your renal arteries (angiogram). This can be done using X-rays, a CT scan (computed tomography angiogram, CTA), or a type of MRI (magnetic resonance angiogram, MRA).  How is this treated?  Making lifestyle changes to reduce your risk factors is the first treatment option for early CHARLEEN. If the blood flow to one of your kidneys is cut by more than half, you may need medicine to:  · Lower your blood pressure. This is the main medical treatment for CHARLEEN. You may need more than one type of medicine for  this. The types that work best for people with CHARLEEN are:  ? ACE inhibitors.  ? Angiotensin receptor blockers.  · Reduce fluid in the body (diuretics).  · Lower your cholesterol (statins).  If medicine is not enough to control CHARLEEN, you may need surgery. This may involve:  · Threading a tube with an inflatable balloon into the renal artery to force it to open (angioplasty).  · Removing plaque from inside the artery (endarterectomy).  Follow these instructions at home:    Lifestyle  · Make any lifestyle changes recommended by your health care provider. This may include:  ? Working with a dietitian to maintain a heart-healthy diet. This type of diet is low in saturated fat, salt, and added sugar.  ? Starting an exercise program as directed by your health care provider.  ? Maintaining a healthy weight.  ? Quitting smoking.  ? Not abusing alcohol.  General instructions  · Take over-the-counter and prescription medicines only as told by your health care provider.  · Keep all follow-up visits as told by your health care provider. This is important.  Contact a health care provider if:  · Your symptoms of CHARLEEN are not getting better.  · Your symptoms are changing or getting worse.  Get help right away if you have:  · Very bad pain in your back or abdomen.  · Blood in your urine.  Summary  · Renal artery stenosis (CHARELEN) is a narrowing of the artery that carries blood to the kidneys. It can affect one or both kidneys.  · CHARLEEN usually develops slowly. You may not have any signs or symptoms at first, but high blood pressure that is difficult to control is a key symptom.  · Making lifestyle changes to reduce your risk factors is the first treatment option for early CHARLEEN. If the blood flow to one of your kidneys is cut by more than half, you may need medicines to help manage your cholesterol and blood pressure.  This information is not intended to replace advice given to you by your health care provider. Make sure you discuss any  questions you have with your health care provider.  Document Revised: 01/14/2019 Document Reviewed: 01/14/2019  [employer] Patient Education © 2021 [employer]  m/ACP or call [phone] and someone will contact you directly.

## 2021-06-17 DIAGNOSIS — Z00.6 EXAMINATION FOR NORMAL COMPARISON FOR CLINICAL RESEARCH: Primary | ICD-10-CM

## 2021-06-28 ENCOUNTER — OFFICE VISIT (OUTPATIENT)
Dept: CARDIAC SURGERY | Facility: CLINIC | Age: 81
End: 2021-06-28

## 2021-06-28 VITALS
HEART RATE: 75 BPM | SYSTOLIC BLOOD PRESSURE: 127 MMHG | TEMPERATURE: 97.3 F | OXYGEN SATURATION: 95 % | DIASTOLIC BLOOD PRESSURE: 60 MMHG | BODY MASS INDEX: 33.65 KG/M2 | WEIGHT: 209.4 LBS | HEIGHT: 66 IN

## 2021-06-28 DIAGNOSIS — I73.9 PAD (PERIPHERAL ARTERY DISEASE) (HCC): Primary | ICD-10-CM

## 2021-06-28 PROCEDURE — 99204 OFFICE O/P NEW MOD 45 MIN: CPT | Performed by: THORACIC SURGERY (CARDIOTHORACIC VASCULAR SURGERY)

## 2021-06-28 RX ORDER — ALBUTEROL SULFATE 90 UG/1
2 AEROSOL, METERED RESPIRATORY (INHALATION) EVERY 4 HOURS PRN
COMMUNITY
Start: 2021-06-15 | End: 2022-04-25

## 2021-06-28 RX ORDER — DEXAMETHASONE 4 MG/1
2 TABLET ORAL
COMMUNITY
Start: 2021-06-27 | End: 2022-10-26

## 2021-06-28 NOTE — PROGRESS NOTES
06/28/2021  Patient Information  Marissa JOHN Carey                                                                                           [place] [address]   1940  'PCP/Referring Physician'  Sam Rogers MD  [phone]  Rekha Russo APRN  [phone]  Chief Complaint   Patient presents with   • Peripheral Vascular Disease     Referred by DENIS Bolanos for PVD and renal artery stenosis        History of Present Illness:   The patient is a 80-year-old female who was referred to me to evaluate bilateral renal artery stenosis as well as peripheral vascular disease.  This patient is a former smoker who stopped in 1997, at the time of an abdominal aortic aneurysm repair.  It appears she had a bifurcated graft placed from the renal arteries to the level of the iliacs at that time.  She has some neuropathy that is known and she has a CT scan which demonstrates a moderate degree of right common femoral artery stenosis.  She has some hyperemia of the toes and thickened nails but it is very difficult to elicit a true claudication history or a true rest pain or nonhealing ulcer history from this patient.  She is on a number of antihypertensive medications.      Patient Active Problem List   Diagnosis   • CAD, multiple vessel   • Carotid bruit   • PAD (peripheral artery disease) (CMS/HCC)   • Diminished pulses in lower extremity   • GERD (gastroesophageal reflux disease)   • Hyperlipidemia   • Hypertension   • Pulmonary hypertension (CMS/HCC)   • Former smoker   • Shortness of breath   • Abnormal stress test   • Palpitations   • Precordial pain   • History of AAA (abdominal aortic aneurysm) repair   • Bilateral carotid artery stenosis   • Dizziness   • Peripheral edema   • Renal artery stenosis (CMS/HCC)   • Mesenteric artery stenosis (CMS/HCC)     Past Medical History:   Diagnosis Date   • Aneurysm (CMS/HCC)     abdominal aortic aneurysm    • Aortic aneurysm (CMS/HCC)    • Arthritis    •  Asthma    • Bilateral foot pain    • CAD, multiple vessel    • Carotid bruit    • Chest pain    • Claudication (CMS/HCC)    • COPD (chronic obstructive pulmonary disease) (CMS/HCC)    • COVID-19 vaccine administered 02/13/2021 03/13/2021 - Pfzier    • COVID-19 vaccine administered 02/13/2021 03/13/2021 - Pzfier    • Deviated septum    • Diminished pulses in lower extremity    • Edema    • Former smoker    • GERD (gastroesophageal reflux disease)    • History of transfusion    • Hyperlipidemia    • Hypertension    • Neuropathy    • Palpitations    • Pulmonary hypertension (CMS/HCC)    • Skin cancer     nose   • TIA (transient ischemic attack)    • Tinnitus    • UTI (urinary tract infection)    • Vision impairment      Past Surgical History:   Procedure Laterality Date   • ABDOMINAL AORTIC ANEURYSM REPAIR     • CARDIAC CATHETERIZATION     • CARDIAC CATHETERIZATION N/A 4/30/2021    Procedure: Left Heart Cath;  Surgeon: Barry Bates MD;  Location: [facility] CATH INVASIVE LOCATION;  Service: Cardiovascular;  Laterality: N/A;   • CORONARY ANGIOPLASTY WITH STENT PLACEMENT     • CORONARY ARTERY BYPASS GRAFT      3 vessel 2006   • HIP FRACTURE SURGERY Right    • KNEE SURGERY     • OTHER SURGICAL HISTORY      hip repair   • OTHER SURGICAL HISTORY      surgery for abdominal aortic aneurysm, double repair   • WRIST SURGERY Right     due to fx       Current Outpatient Medications:   •  amLODIPine (NORVASC) 2.5 MG tablet, Take 2.5 mg by mouth Every Night., Disp: , Rfl:   •  aspirin 81 MG tablet, Take 81 mg by mouth Every Night., Disp: , Rfl:   •  carvedilol (COREG) 6.25 MG tablet, Take one table by mouth in AM if SBP > 130 and take one tablet by mouth in PM if SBP > 130 (Patient taking differently: Take 6.25 mg by mouth Every Night.), Disp: 60 tablet, Rfl: 5  •  clopidogrel (PLAVIX) 75 MG tablet, Take 75 mg by mouth Every Night., Disp: , Rfl:   •  Docusate Calcium (STOOL SOFTENER PO), Take 1 tablet by mouth Daily As  Needed., Disp: , Rfl:   •  Ferrous Gluconate (IRON 27 PO), Take 65 mg by mouth Every Other Day., Disp: , Rfl:   •  Flovent  MCG/ACT inhaler, , Disp: , Rfl:   •  furosemide (LASIX) 20 MG tablet, Take 20 mg by mouth Daily As Needed., Disp: , Rfl:   •  isosorbide mononitrate (IMDUR) 30 MG 24 hr tablet, Take 1 tablet by mouth Daily. (Patient taking differently: Take 30 mg by mouth Every Night.), Disp: 90 tablet, Rfl: 3  •  LINZESS 145 MCG capsule capsule, Take 145 mcg by mouth As Needed., Disp: , Rfl:   •  memantine (NAMENDA XR) 7 MG capsule sustained-release 24 hr extended release capsule, Take 28 mg by mouth Every Morning., Disp: , Rfl:   •  nitroglycerin (NITROSTAT) 0.4 MG SL tablet, Place 0.4 mg under the tongue Every 5 (Five) Minutes As Needed. Place 1 tablet under the tongue every 5 minutes for up to doses as needed for chest pain . Call 911 if pain persists, Disp: , Rfl:   •  Omega-3 Fatty Acids (FISH OIL) 1200 MG capsule capsule, Take 1,200 mg by mouth 2 (Two) Times a Day With Meals. With 360 mg omega 3 bid, Disp: , Rfl:   •  ranolazine (RANEXA) 500 MG 12 hr tablet, Take 1 tablet by mouth every 12 (twelve) hours., Disp: , Rfl:   •  rosuvastatin (CRESTOR) 20 MG tablet, Take 20 mg by mouth Every Night., Disp: , Rfl:   •  sertraline (ZOLOFT) 50 MG tablet, Take 50 mg by mouth Every Night., Disp: , Rfl:   •  TOVIAZ 4 MG tablet sustained-release 24 hour tablet, Take 4 mg by mouth Daily., Disp: , Rfl:   •  Ventolin  (90 Base) MCG/ACT inhaler, Inhale 2 puffs Every 4 (Four) Hours As Needed., Disp: , Rfl:   Allergies   Allergen Reactions   • Morphine Hallucinations     DOES NOT DO WELL WITH MOST ALL NARCOTICS     Social History     Socioeconomic History   • Marital status:      Spouse name: Not on file   • Number of children: 3   • Years of education: Not on file   • Highest education level: Not on file   Tobacco Use   • Smoking status: Former Smoker     Packs/day: 0.50     Years: 30.00     Pack  years: 15.00     Types: Cigarettes     Quit date:      Years since quittin.5   • Smokeless tobacco: Never Used   Vaping Use   • Vaping Use: Never used   Substance and Sexual Activity   • Alcohol use: No   • Drug use: No   • Sexual activity: Defer     Family History   Problem Relation Age of Onset   • Other Mother         acute myocardial infarction   • Aneurysm Mother    • Goiter Mother    • Aneurysm Father    • Sudden death Father    • Heart failure Sister    • Other Other         leukemia   • Cancer Other         thyroid     Review of Systems   Constitutional: Negative for chills, fever, malaise/fatigue, night sweats and weight loss.   HENT: Positive for hearing loss. Negative for odynophagia and sore throat.    Cardiovascular: Positive for chest pain (pressure on exertion), claudication, dyspnea on exertion, leg swelling (right leg) and palpitations. Negative for orthopnea.   Respiratory: Positive for cough, shortness of breath and wheezing. Negative for hemoptysis.    Endocrine: Negative for cold intolerance, heat intolerance, polydipsia, polyphagia and polyuria.   Hematologic/Lymphatic: Does not bruise/bleed easily.   Skin: Negative for itching and rash.   Musculoskeletal: Positive for arthritis, back pain and joint pain. Negative for joint swelling and myalgias.   Gastrointestinal: Positive for abdominal pain. Negative for constipation, diarrhea, hematemesis, hematochezia, melena, nausea and vomiting.   Genitourinary: Negative for dysuria, frequency and hematuria.   Neurological: Positive for dizziness, light-headedness and loss of balance. Negative for focal weakness, headaches, numbness and seizures.   Psychiatric/Behavioral: Positive for depression. Negative for suicidal ideas. The patient is nervous/anxious.    All other systems reviewed and are negative.    Vitals:    21 0806   BP: 127/60   BP Location: Right arm   Patient Position: Sitting   Pulse: 75   Temp: 97.3 °F (36.3 °C)   SpO2: 95%  "  Weight: 95 kg (209 lb 6.4 oz)   Height: 167.6 cm (66\")      Physical Exam    CONSTITUTIONAL: Alert and conversant, Well dressed, Well nourished, No acute distress  EYES: Sclera clean, Anicteric, Pupils equal  ENT: No nasal deviation, Trachea midline  NECK: No neck masses, Supple  LUNGS: No wheezing, Cough, non-congested  HEART: No rubs, No murmurs  GASTROINTESTINAL: Soft, non-distended, No masses, Non tender  to palpation, normal bowel sounds  NEURO: No motor deficits, No sensory deficits, Cranial Nerves 2 through 12 grossly intact  PSYCHIATRIC: Oriented to person, place and time, No memory deficits, Mood appropriate  VASCULAR: No carotid bruits, Femoral pulses palpable and symmetric.  I hear a good quality Doppler signal in the posterior tibial, anterior tibial, and dorsalis pedis arteries bilaterally.  There is hyperemia of the toes but also the dorsalis pedis arteries are weakly palpable  MUSKULOSKELETAL: No extremity trauma or extremity asymmetry    The ROS, past medical history, surgical history, family history, social history and vitals were reviewed by myself and corrected as needed.      Labs/Imaging:  I reviewed the CT angiogram report and images and I detect no significant peripheral vascular disease that would require intervention except for possibly a formal study.    Assessment/Plan:   The patient is a 80-year-old female who was referred to me to evaluate bilateral renal artery stenosis as well as peripheral vascular disease.  She has not smoked since 1997, when she had an infrarenal aortic aneurysm repaired with what appears to be a bifurcated intra-abdominal graft done by the open technique.  I am able to detect palpable dorsalis pedis pulses bilaterally and I have a good quality Doppler signal in the posterior tibial, anterior tibial, and dorsalis pedis bilaterally.  I do not believe she has significant peripheral arterial disease.  She does, however, have what appears to be significant bilateral " renal stenosis by CT scan, but there is heavy calcification and it is difficult to make a determination. I believe we should proceed with a formal aortogram to evaluate for possible renal artery stenting because of her hypertension and blood pressure medications she is agreeable to that. We will also study the lower extremities to be certain, but I believe that there is no surgical intervention required on the legs, whatsoever, other than a possible renal stent.    Patient Active Problem List   Diagnosis   • CAD, multiple vessel   • Carotid bruit   • PAD (peripheral artery disease) (CMS/HCC)   • Diminished pulses in lower extremity   • GERD (gastroesophageal reflux disease)   • Hyperlipidemia   • Hypertension   • Pulmonary hypertension (CMS/HCC)   • Former smoker   • Shortness of breath   • Abnormal stress test   • Palpitations   • Precordial pain   • History of AAA (abdominal aortic aneurysm) repair   • Bilateral carotid artery stenosis   • Dizziness   • Peripheral edema   • Renal artery stenosis (CMS/HCC)   • Mesenteric artery stenosis (CMS/HCC)       CC: MD Rekha Ordonez APRN Regina Fugate editing for Silvio Doe MD    I, Silvio Doe MD, have read and agree with the editing done by Shreya Courtney, .

## 2021-07-09 ENCOUNTER — TELEPHONE (OUTPATIENT)
Dept: CARDIAC SURGERY | Facility: CLINIC | Age: 81
End: 2021-07-09

## 2021-07-30 DIAGNOSIS — I73.9 PAD (PERIPHERAL ARTERY DISEASE) (HCC): Primary | ICD-10-CM

## 2021-08-02 ENCOUNTER — TELEPHONE (OUTPATIENT)
Dept: CARDIAC SURGERY | Facility: CLINIC | Age: 81
End: 2021-08-02

## 2021-08-02 NOTE — TELEPHONE ENCOUNTER
Pt not available for PAT on 08/16. I have placed her back on Dr. Doe pending list and will discuss a different surgery date with Dr. Doe.

## 2021-08-16 ENCOUNTER — HOSPITAL ENCOUNTER (EMERGENCY)
Age: 81
Discharge: HOME | End: 2021-08-16
Payer: MEDICARE

## 2021-08-16 VITALS
OXYGEN SATURATION: 97 % | TEMPERATURE: 98.24 F | SYSTOLIC BLOOD PRESSURE: 146 MMHG | DIASTOLIC BLOOD PRESSURE: 75 MMHG | HEART RATE: 72 BPM | RESPIRATION RATE: 19 BRPM

## 2021-08-16 VITALS
DIASTOLIC BLOOD PRESSURE: 75 MMHG | SYSTOLIC BLOOD PRESSURE: 146 MMHG | OXYGEN SATURATION: 97 % | TEMPERATURE: 98.24 F | RESPIRATION RATE: 19 BRPM | HEART RATE: 72 BPM

## 2021-08-16 VITALS — BODY MASS INDEX: 33.4 KG/M2

## 2021-08-16 DIAGNOSIS — I25.2: ICD-10-CM

## 2021-08-16 DIAGNOSIS — I10: ICD-10-CM

## 2021-08-16 DIAGNOSIS — E78.5: ICD-10-CM

## 2021-08-16 DIAGNOSIS — S81.831A: Primary | ICD-10-CM
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
## 2021-08-16 PROCEDURE — G0463 HOSPITAL OUTPT CLINIC VISIT: HCPCS

## 2021-08-16 PROCEDURE — 99202 OFFICE O/P NEW SF 15 MIN: CPT

## 2021-09-21 DIAGNOSIS — I73.9 PAD (PERIPHERAL ARTERY DISEASE) (HCC): Primary | ICD-10-CM

## 2021-09-24 ENCOUNTER — TELEPHONE (OUTPATIENT)
Dept: CARDIOLOGY | Facility: CLINIC | Age: 81
End: 2021-09-24

## 2021-09-24 NOTE — TELEPHONE ENCOUNTER
----- Message from DENIS Kingsley sent at 9/24/2021  7:51 AM EDT -----  Let him know that unfortunately I can not advise on their schedule, but he can contact their office for an update from them.  Walking is the best thing for PVD.  Even if it is for short distance for a short amount of time. I am sending them some info in the mail and it will give them things to look out for and thing to help.    ----- Message -----  From: Kayla Acosta  Sent: 9/24/2021   7:45 AM EDT  To: DENIS Kingsley    Pt's  presented in office stating that he would like to speak w/ you regd the surgery pt was supposed to have w/ Dr. Doe. Stated that she was supposed to have it in august, but that it is still not scheduled due to Covid 19. He stated that he would like an idea of time frame and what we can do for pt.   #[phone], okay to leave a detailed message.

## 2021-09-24 NOTE — TELEPHONE ENCOUNTER
I called him back and informed him of the message from Rekha, he verbalized understanding. He stated that their office tried to call pt, but she didn't answer. Stated he's waiting for a call back and hoping for good news.

## 2021-09-27 ENCOUNTER — PREP FOR SURGERY (OUTPATIENT)
Dept: OTHER | Facility: HOSPITAL | Age: 81
End: 2021-09-27

## 2021-09-27 DIAGNOSIS — I73.9 PAD (PERIPHERAL ARTERY DISEASE) (HCC): Primary | ICD-10-CM

## 2021-10-04 ENCOUNTER — APPOINTMENT (OUTPATIENT)
Dept: PREADMISSION TESTING | Facility: HOSPITAL | Age: 81
End: 2021-10-04

## 2021-10-05 ENCOUNTER — PRE-ADMISSION TESTING (OUTPATIENT)
Dept: PREADMISSION TESTING | Facility: HOSPITAL | Age: 81
End: 2021-10-05

## 2021-10-05 ENCOUNTER — ANESTHESIA EVENT (OUTPATIENT)
Dept: PERIOP | Facility: HOSPITAL | Age: 81
End: 2021-10-05

## 2021-10-05 VITALS — WEIGHT: 204 LBS | HEIGHT: 68 IN | BODY MASS INDEX: 30.92 KG/M2

## 2021-10-05 DIAGNOSIS — I73.9 PAD (PERIPHERAL ARTERY DISEASE) (HCC): ICD-10-CM

## 2021-10-05 LAB
ANION GAP SERPL CALCULATED.3IONS-SCNC: 10 MMOL/L (ref 5–15)
APTT PPP: 26.4 SECONDS (ref 22–39)
BUN SERPL-MCNC: 14 MG/DL (ref 8–23)
BUN/CREAT SERPL: 13.6 (ref 7–25)
CALCIUM SPEC-SCNC: 9.5 MG/DL (ref 8.6–10.5)
CHLORIDE SERPL-SCNC: 103 MMOL/L (ref 98–107)
CO2 SERPL-SCNC: 25 MMOL/L (ref 22–29)
CREAT SERPL-MCNC: 1.03 MG/DL (ref 0.57–1)
DEPRECATED RDW RBC AUTO: 51.4 FL (ref 37–54)
ERYTHROCYTE [DISTWIDTH] IN BLOOD BY AUTOMATED COUNT: 15 % (ref 12.3–15.4)
GFR SERPL CREATININE-BSD FRML MDRD: 52 ML/MIN/1.73
GLUCOSE SERPL-MCNC: 103 MG/DL (ref 65–99)
HBA1C MFR BLD: 5.3 % (ref 4.8–5.6)
HCT VFR BLD AUTO: 38.5 % (ref 34–46.6)
HGB BLD-MCNC: 12.3 G/DL (ref 12–15.9)
INR PPP: 1.11 (ref 0.85–1.16)
MCH RBC QN AUTO: 29.9 PG (ref 26.6–33)
MCHC RBC AUTO-ENTMCNC: 31.9 G/DL (ref 31.5–35.7)
MCV RBC AUTO: 93.4 FL (ref 79–97)
PLATELET # BLD AUTO: 184 10*3/MM3 (ref 140–450)
PMV BLD AUTO: 10.4 FL (ref 6–12)
POTASSIUM SERPL-SCNC: 4 MMOL/L (ref 3.5–5.2)
PROTHROMBIN TIME: 13.9 SECONDS (ref 11.4–14.4)
RBC # BLD AUTO: 4.12 10*6/MM3 (ref 3.77–5.28)
SARS-COV-2 RNA PNL SPEC NAA+PROBE: NOT DETECTED
SODIUM SERPL-SCNC: 138 MMOL/L (ref 136–145)
WBC # BLD AUTO: 7.34 10*3/MM3 (ref 3.4–10.8)

## 2021-10-05 PROCEDURE — 83036 HEMOGLOBIN GLYCOSYLATED A1C: CPT

## 2021-10-05 PROCEDURE — 36415 COLL VENOUS BLD VENIPUNCTURE: CPT

## 2021-10-05 PROCEDURE — 85027 COMPLETE CBC AUTOMATED: CPT

## 2021-10-05 PROCEDURE — C9803 HOPD COVID-19 SPEC COLLECT: HCPCS

## 2021-10-05 PROCEDURE — U0005 INFEC AGEN DETEC AMPLI PROBE: HCPCS

## 2021-10-05 PROCEDURE — 93005 ELECTROCARDIOGRAM TRACING: CPT

## 2021-10-05 PROCEDURE — 93010 ELECTROCARDIOGRAM REPORT: CPT | Performed by: INTERNAL MEDICINE

## 2021-10-05 PROCEDURE — 80048 BASIC METABOLIC PNL TOTAL CA: CPT

## 2021-10-05 PROCEDURE — U0004 COV-19 TEST NON-CDC HGH THRU: HCPCS

## 2021-10-05 PROCEDURE — 85610 PROTHROMBIN TIME: CPT

## 2021-10-05 PROCEDURE — 85730 THROMBOPLASTIN TIME PARTIAL: CPT

## 2021-10-05 RX ORDER — FAMOTIDINE 10 MG/ML
20 INJECTION, SOLUTION INTRAVENOUS ONCE
Status: CANCELLED | OUTPATIENT
Start: 2021-10-05 | End: 2021-10-05

## 2021-10-05 NOTE — PAT
[facility] covid test done.     Pt has Pulmonary HTN, last saw Pulmonologist 6/2021, see note on chart.  Dr. Moreno would not give pulmonary clearance without seeing pt again first.   Notified Dr. Mckeon, states pulmonary clearance is not necessary for the Aortagram.  ECHO results from 4/2021 on chart per Dr. Mckeon's request.     Patient did not review general PAT education video as instructed in their preoperative information received from their surgeon.  One-on-one Pre Admission Testing general education provided during PAT visit.  Copies of PAT general education handouts (Incentive Spirometry, Meds to Beds Program, Patient Belongings, Pre-op skin preparation instructions, Blood Glucose testing, Visitor policy, Surgery FAQ, Code H) distributed to patient. Encouraged patient/family to read PAT general education handouts thoroughly and notify PAT staff with any questions or concerns. Patient instructed to bring PAT pass and completed skin prep sheet (if applicable) on the day of procedure. Patient verbalized understanding of all information and priority content.

## 2021-10-06 ENCOUNTER — ANESTHESIA (OUTPATIENT)
Dept: PERIOP | Facility: HOSPITAL | Age: 81
End: 2021-10-06

## 2021-10-06 ENCOUNTER — APPOINTMENT (OUTPATIENT)
Dept: GENERAL RADIOLOGY | Facility: HOSPITAL | Age: 81
End: 2021-10-06

## 2021-10-06 ENCOUNTER — HOSPITAL ENCOUNTER (OUTPATIENT)
Facility: HOSPITAL | Age: 81
Discharge: HOME | End: 2021-10-06
Attending: THORACIC SURGERY (CARDIOTHORACIC VASCULAR SURGERY) | Admitting: THORACIC SURGERY (CARDIOTHORACIC VASCULAR SURGERY)

## 2021-10-06 VITALS
SYSTOLIC BLOOD PRESSURE: 91 MMHG | HEART RATE: 73 BPM | WEIGHT: 204 LBS | BODY MASS INDEX: 30.92 KG/M2 | TEMPERATURE: 98 F | DIASTOLIC BLOOD PRESSURE: 47 MMHG | RESPIRATION RATE: 20 BRPM | OXYGEN SATURATION: 100 % | HEIGHT: 68 IN

## 2021-10-06 DIAGNOSIS — I73.9 PAD (PERIPHERAL ARTERY DISEASE) (HCC): ICD-10-CM

## 2021-10-06 LAB
QT INTERVAL: 432 MS
QTC INTERVAL: 475 MS

## 2021-10-06 PROCEDURE — 0 IODIXANOL PER 1 ML: Performed by: THORACIC SURGERY (CARDIOTHORACIC VASCULAR SURGERY)

## 2021-10-06 PROCEDURE — 75625 CONTRAST EXAM ABDOMINL AORTA: CPT | Performed by: THORACIC SURGERY (CARDIOTHORACIC VASCULAR SURGERY)

## 2021-10-06 PROCEDURE — 25010000002 PROPOFOL 10 MG/ML EMULSION: Performed by: NURSE ANESTHETIST, CERTIFIED REGISTERED

## 2021-10-06 PROCEDURE — 25010000002 HYDRALAZINE PER 20 MG: Performed by: NURSE ANESTHETIST, CERTIFIED REGISTERED

## 2021-10-06 PROCEDURE — C1894 INTRO/SHEATH, NON-LASER: HCPCS | Performed by: THORACIC SURGERY (CARDIOTHORACIC VASCULAR SURGERY)

## 2021-10-06 PROCEDURE — 36200 PLACE CATHETER IN AORTA: CPT | Performed by: THORACIC SURGERY (CARDIOTHORACIC VASCULAR SURGERY)

## 2021-10-06 PROCEDURE — C1887 CATHETER, GUIDING: HCPCS | Performed by: THORACIC SURGERY (CARDIOTHORACIC VASCULAR SURGERY)

## 2021-10-06 PROCEDURE — 75625 CONTRAST EXAM ABDOMINL AORTA: CPT

## 2021-10-06 PROCEDURE — S0260 H&P FOR SURGERY: HCPCS | Performed by: THORACIC SURGERY (CARDIOTHORACIC VASCULAR SURGERY)

## 2021-10-06 PROCEDURE — 75716 ARTERY X-RAYS ARMS/LEGS: CPT

## 2021-10-06 PROCEDURE — C1769 GUIDE WIRE: HCPCS | Performed by: THORACIC SURGERY (CARDIOTHORACIC VASCULAR SURGERY)

## 2021-10-06 PROCEDURE — 75710 ARTERY X-RAYS ARM/LEG: CPT | Performed by: THORACIC SURGERY (CARDIOTHORACIC VASCULAR SURGERY)

## 2021-10-06 PROCEDURE — 25010000002 HEPARIN (PORCINE) PER 1000 UNITS: Performed by: THORACIC SURGERY (CARDIOTHORACIC VASCULAR SURGERY)

## 2021-10-06 RX ORDER — PROPOFOL 10 MG/ML
VIAL (ML) INTRAVENOUS AS NEEDED
Status: DISCONTINUED | OUTPATIENT
Start: 2021-10-06 | End: 2021-10-06 | Stop reason: SURG

## 2021-10-06 RX ORDER — LIDOCAINE HYDROCHLORIDE 10 MG/ML
INJECTION, SOLUTION INFILTRATION; PERINEURAL AS NEEDED
Status: DISCONTINUED | OUTPATIENT
Start: 2021-10-06 | End: 2021-10-06 | Stop reason: HOSPADM

## 2021-10-06 RX ORDER — SODIUM CHLORIDE 450 MG/100ML
100 INJECTION, SOLUTION INTRAVENOUS CONTINUOUS
Status: DISCONTINUED | OUTPATIENT
Start: 2021-10-06 | End: 2021-10-06 | Stop reason: HOSPADM

## 2021-10-06 RX ORDER — MIDAZOLAM HYDROCHLORIDE 1 MG/ML
0.5 INJECTION INTRAMUSCULAR; INTRAVENOUS
Status: DISCONTINUED | OUTPATIENT
Start: 2021-10-06 | End: 2021-10-06 | Stop reason: HOSPADM

## 2021-10-06 RX ORDER — SODIUM CHLORIDE 0.9 % (FLUSH) 0.9 %
10 SYRINGE (ML) INJECTION AS NEEDED
Status: DISCONTINUED | OUTPATIENT
Start: 2021-10-06 | End: 2021-10-06 | Stop reason: HOSPADM

## 2021-10-06 RX ORDER — SODIUM CHLORIDE, SODIUM LACTATE, POTASSIUM CHLORIDE, CALCIUM CHLORIDE 600; 310; 30; 20 MG/100ML; MG/100ML; MG/100ML; MG/100ML
9 INJECTION, SOLUTION INTRAVENOUS CONTINUOUS
Status: DISCONTINUED | OUTPATIENT
Start: 2021-10-06 | End: 2021-10-06 | Stop reason: HOSPADM

## 2021-10-06 RX ORDER — ACETAMINOPHEN 325 MG/1
650 TABLET ORAL EVERY 6 HOURS PRN
Status: DISCONTINUED | OUTPATIENT
Start: 2021-10-06 | End: 2021-10-06 | Stop reason: HOSPADM

## 2021-10-06 RX ORDER — FAMOTIDINE 20 MG/1
20 TABLET, FILM COATED ORAL ONCE
Status: COMPLETED | OUTPATIENT
Start: 2021-10-06 | End: 2021-10-06

## 2021-10-06 RX ORDER — FENTANYL CITRATE 50 UG/ML
50 INJECTION, SOLUTION INTRAMUSCULAR; INTRAVENOUS
Status: DISCONTINUED | OUTPATIENT
Start: 2021-10-06 | End: 2021-10-06 | Stop reason: HOSPADM

## 2021-10-06 RX ORDER — SODIUM CHLORIDE 0.9 % (FLUSH) 0.9 %
10 SYRINGE (ML) INJECTION EVERY 12 HOURS SCHEDULED
Status: DISCONTINUED | OUTPATIENT
Start: 2021-10-06 | End: 2021-10-06 | Stop reason: HOSPADM

## 2021-10-06 RX ORDER — HYDRALAZINE HYDROCHLORIDE 20 MG/ML
5 INJECTION INTRAMUSCULAR; INTRAVENOUS
Status: DISCONTINUED | OUTPATIENT
Start: 2021-10-06 | End: 2021-10-06 | Stop reason: HOSPADM

## 2021-10-06 RX ORDER — LIDOCAINE HYDROCHLORIDE 10 MG/ML
0.5 INJECTION, SOLUTION EPIDURAL; INFILTRATION; INTRACAUDAL; PERINEURAL ONCE AS NEEDED
Status: DISCONTINUED | OUTPATIENT
Start: 2021-10-06 | End: 2021-10-06 | Stop reason: HOSPADM

## 2021-10-06 RX ORDER — PROPOFOL 10 MG/ML
VIAL (ML) INTRAVENOUS CONTINUOUS PRN
Status: DISCONTINUED | OUTPATIENT
Start: 2021-10-06 | End: 2021-10-06 | Stop reason: SURG

## 2021-10-06 RX ORDER — IODIXANOL 320 MG/ML
INJECTION, SOLUTION INTRAVASCULAR AS NEEDED
Status: DISCONTINUED | OUTPATIENT
Start: 2021-10-06 | End: 2021-10-06 | Stop reason: HOSPADM

## 2021-10-06 RX ADMIN — HYDRALAZINE HYDROCHLORIDE 5 MG: 20 INJECTION INTRAMUSCULAR; INTRAVENOUS at 08:51

## 2021-10-06 RX ADMIN — ACETAMINOPHEN 650 MG: 325 TABLET ORAL at 09:04

## 2021-10-06 RX ADMIN — SODIUM CHLORIDE 100 ML/HR: 4.5 INJECTION, SOLUTION INTRAVENOUS at 10:21

## 2021-10-06 RX ADMIN — SODIUM CHLORIDE, POTASSIUM CHLORIDE, SODIUM LACTATE AND CALCIUM CHLORIDE 9 ML/HR: 600; 310; 30; 20 INJECTION, SOLUTION INTRAVENOUS at 07:51

## 2021-10-06 RX ADMIN — LIDOCAINE HYDROCHLORIDE 2 ML: 10 INJECTION, SOLUTION EPIDURAL; INFILTRATION; INTRACAUDAL; PERINEURAL at 07:51

## 2021-10-06 RX ADMIN — HYDRALAZINE HYDROCHLORIDE 5 MG: 20 INJECTION INTRAMUSCULAR; INTRAVENOUS at 09:23

## 2021-10-06 RX ADMIN — PROPOFOL 50 MG: 10 INJECTION, EMULSION INTRAVENOUS at 08:06

## 2021-10-06 RX ADMIN — LIDOCAINE HYDROCHLORIDE 100 MG: 10 INJECTION, SOLUTION EPIDURAL; INFILTRATION; INTRACAUDAL; PERINEURAL at 08:06

## 2021-10-06 RX ADMIN — FAMOTIDINE 20 MG: 20 TABLET, FILM COATED ORAL at 07:42

## 2021-10-06 RX ADMIN — Medication 99 MCG/KG/MIN: at 08:06

## 2021-10-06 NOTE — H&P
Pre-Op H&P  Marissa Carey  [id number]  1940    Chief complaint: leg pain    HPI:  Patient is a 81 y.o.female with a significant cardiac history who presents for an abdominal aortogram with peripheral runoff; possible angioplasty and stent with Dr. Doe.     Review of Systems:  General ROS: negative for chills, fever.  Cardiovascular ROS: no chest pain.  Respiratory ROS: no cough, or wheezing. +SOA.     Allergies:   Allergies   Allergen Reactions   • Morphine Hallucinations     DOES NOT DO WELL WITH MOST ALL NARCOTICS       Home Meds:    No current facility-administered medications on file prior to encounter.     Current Outpatient Medications on File Prior to Encounter   Medication Sig Dispense Refill   • amLODIPine (NORVASC) 2.5 MG tablet Take 2.5 mg by mouth Every Night.     • aspirin 81 MG tablet Take 81 mg by mouth Every Night.     • carvedilol (COREG) 6.25 MG tablet Take one table by mouth in AM if SBP > 130 and take one tablet by mouth in PM if SBP > 130 (Patient taking differently: Take 6.25 mg by mouth Every Night.) 60 tablet 5   • clopidogrel (PLAVIX) 75 MG tablet Take 75 mg by mouth Every Night.     • Docusate Calcium (STOOL SOFTENER PO) Take 1 tablet by mouth Daily As Needed.     • Ferrous Gluconate (IRON 27 PO) Take 65 mg by mouth Every Other Day.     • Flovent  MCG/ACT inhaler Inhale 2 puffs 2 (Two) Times a Day.     • furosemide (LASIX) 20 MG tablet Take 20 mg by mouth Daily As Needed.     • isosorbide mononitrate (IMDUR) 30 MG 24 hr tablet Take 1 tablet by mouth Daily. (Patient taking differently: Take 30 mg by mouth Every Night.) 90 tablet 3   • LINZESS 145 MCG capsule capsule Take 145 mcg by mouth As Needed.     • memantine (NAMENDA XR) 7 MG capsule sustained-release 24 hr extended release capsule Take 28 mg by mouth Every Morning.     • nitroglycerin (NITROSTAT) 0.4 MG SL tablet Place 0.4 mg under the tongue Every 5 (Five) Minutes As Needed. Place 1 tablet under the tongue every 5  minutes for up to doses as needed for chest pain . Call 911 if pain persists     • Omega-3 Fatty Acids (FISH OIL) 1200 MG capsule capsule Take 1,200 mg by mouth 2 (Two) Times a Day With Meals. With 360 mg omega 3 bid     • ranolazine (RANEXA) 500 MG 12 hr tablet Take 1 tablet by mouth every 12 (twelve) hours.     • rosuvastatin (CRESTOR) 20 MG tablet Take 20 mg by mouth Every Night.     • sertraline (ZOLOFT) 50 MG tablet Take 50 mg by mouth Every Night.     • TOVIAZ 4 MG tablet sustained-release 24 hour tablet Take 4 mg by mouth Daily.     • Ventolin  (90 Base) MCG/ACT inhaler Inhale 2 puffs Every 4 (Four) Hours As Needed.         PMH:   Past Medical History:   Diagnosis Date   • Aneurysm (HCC)     abdominal aortic aneurysm    • Aortic aneurysm (HCC)    • Arthritis    • Asthma    • Bilateral foot pain    • CAD, multiple vessel    • Carotid bruit    • Chest pain    • Claudication ([facility])    • COPD (chronic obstructive pulmonary disease) ([facility])    • COVID-19 vaccine administered 02/13/2021 03/13/2021 - Pfzier    • COVID-19 vaccine administered 02/13/2021 03/13/2021 - Pzfier    • Deviated septum    • Diminished pulses in lower extremity    • Edema    • Former smoker    • GERD (gastroesophageal reflux disease)    • History of transfusion    • Hyperlipidemia    • Hypertension    • Neuropathy    • Palpitations    • Pulmonary hypertension (HCC)    • Skin cancer     nose   • Stroke ([facility]) 2006    TIAs many years ago, pt denies residual effects   • TIA (transient ischemic attack)    • Tinnitus    • UTI (urinary tract infection)    • Vision impairment      PSH:    Past Surgical History:   Procedure Laterality Date   • ABDOMINAL AORTIC ANEURYSM REPAIR     • CARDIAC CATHETERIZATION     • CARDIAC CATHETERIZATION N/A 4/30/2021    Procedure: Left Heart Cath;  Surgeon: Barry Bates MD;  Location: [facility] CATH INVASIVE LOCATION;  Service: Cardiovascular;  Laterality: N/A;   • CORONARY ANGIOPLASTY WITH STENT  PLACEMENT     • CORONARY ARTERY BYPASS GRAFT      3 vessel    • HIP FRACTURE SURGERY Right    • KNEE SURGERY     • OTHER SURGICAL HISTORY      hip repair   • OTHER SURGICAL HISTORY      surgery for abdominal aortic aneurysm, double repair   • WRIST SURGERY Right     due to fx       Immunization History:  Influenza:   Pneumococcal: Up to date  Tetanus: Has not received one in 10 years     Social History:   Tobacco:   Social History     Tobacco Use   Smoking Status Former Smoker   • Packs/day: 0.50   • Years: 30.00   • Pack years: 15.00   • Types: Cigarettes   • Quit date:    • Years since quittin.7   Smokeless Tobacco Never Used      Alcohol:     Social History     Substance and Sexual Activity   Alcohol Use Yes   • Alcohol/week: 7.0 standard drinks   • Types: 7 Glasses of wine per week       Vitals:           There were no vitals taken for this visit.    Physical Exam:  General Appearance:    Alert, cooperative, no distress, appears stated age   Head:    Normocephalic, without obvious abnormality, atraumatic   Lungs:     Clear to auscultation bilaterally, respirations unlabored    Heart:   Regular rate and rhythm, no murmur, rub or gallop    Abdomen:    Soft, nontender. No rigidity or guarding.            Extremities:   Extremities normal, atraumatic, no cyanosis or edema   Skin:   Skin color, texture, turgor normal, no rashes or lesions   Neurologic:   Grossly intact   Results Review  LABS:  Lab Results   Component Value Date    WBC 7.34 10/05/2021    HGB 12.3 10/05/2021    HCT 38.5 10/05/2021    MCV 93.4 10/05/2021     10/05/2021    GLUCOSE 103 (H) 10/05/2021    BUN 14 10/05/2021    CREATININE 1.03 (H) 10/05/2021    EGFRIFNONA 52 (L) 10/05/2021     10/05/2021    K 4.0 10/05/2021     10/05/2021    CO2 25.0 10/05/2021    MG 2.0 2021    CALCIUM 9.5 10/05/2021    PTT 26.4 10/05/2021    INR 1.11 10/05/2021       RADIOLOGY:  No radiology results for the last 3 days     I  reviewed the patient's new clinical results.    Impression: CAD    Plan: abdominal aortogram with peripheral runoff; possible angioplasty and stent     Reji Chicas PA-C   10/6/2021   07:13 EDT

## 2021-10-06 NOTE — OP NOTE
Operative Report    Preop Diagnosis: Probable renal artery stenosis.  Previous aortobiiliac bypass.            Procedure: Catheter right femoral artery.  Aortogram.  Repositioning of the catheter into the aortoiliac graft and complete arteriographic runoff of the lower extremities        Surgeons: Silvio Doe MD      Assistant: Anneliese Man PA-C    The Assistant provided services of suctioning, irrigation and retraction.  Also, assisted in suture closure of parts of the skin incision.      Indication: This patient has a previous aortobiiliac bypass graft.  She was referred here with a CT angiogram at an outside facility demonstrated potential bilateral renal artery stenosis and she is on a number of blood pressure medications.  In discussion with her in our office she had a minimal complaints of claudication and actually seemed that that was secondary to her referral.  She understood our plan procedure and agreed to proceed        Description: Supine position with sterile prep and drape.  Right femoral artery was percutaneously cannulated and an 035 guide was placed in the suprarenal position.  Aortogram demonstrated the renal arteries had heavy calcium burden but I can detect no significant stenosis in 2 different injections with different views.  The catheter was then repositioned into the body of the previously placed aortobiiliac graft.  Severe scoliosis of the spine was also noted.  Complete arteriographic runoff demonstrated the aortobiiliac graft was patent the right external iliac common femoral profundus femoral are widely patent.  There was calcium noted but we felt there was no hemodynamically significant stenosis to the right femoral and proximal superficial femoral artery system.  The right popliteal artery occluded above the knee and reconstituted below the knee as the below-knee popliteal.  Number of collateral vessels were seen.  On the left leg the external iliac, common femoral, and  profundus femoral were widely patent without hemodynamically significant stenosis.  Similarly the left superficial femoral and popliteal artery were heavily calcified but had no significant stenosis.  Total contrast given was 148 mL.  Total fluoroscopy time 1 minute and 40 seconds.      It should be noted post procedure in discussing these findings with the patient's  that he indicated that the patient actually does have significant right leg pain but she did not tell me this in the office.  She does have significant spinal scoliosis and he says often this limits her walking but the pain in her right foot is quite significant.  I have indicated to him that we could perform a right above-knee popliteal to below-knee popliteal bypass with CryoVein as her saphenous vein has been harvested for coronary bypass grafting surgery.  Would like to have the patient and her  return to our office for a check of her cannulation site in 2 to 3 weeks and we will question her further regarding her right leg pain.  If it is significant we could proceed with bypass grafting if she thinks this can be managed nonoperatively we will attempt that also.     they are agreeable to that plan    Please note that portions of this note were completed with a voice recognition program. Efforts were made to edit the dictations, but occasionally words are mistranscribed.

## 2021-10-06 NOTE — ANESTHESIA PREPROCEDURE EVALUATION
Anesthesia Evaluation     Patient summary reviewed and Nursing notes reviewed   NPO Solid Status: > 8 hours  NPO Liquid Status: > 2 hours           Airway   Mallampati: II  TM distance: >3 FB  Neck ROM: full  Dental          Pulmonary    (+) a smoker (20YRS AGO ) Former, COPD (MDI) moderate, asthma,shortness of breath,   (-) pneumonia    ROS comment: PULM HTN  Cardiovascular     ECG reviewed    (+) hypertension, CAD, CABG >6 Months, cardiac stents more than 12 months ago PVD (claudication anuerysm AAA), hyperlipidemia,  carotid artery disease (subcritical Data def)  (-) dysrhythmias, angina    ROS comment: ECG Accelerated Junctional rhythm  Left axis deviation (was SR in 2012)    ECHO 2021 EF  51%.  Mild concentric LVH   LVDD grade 2  moderate LA RA  enlargement.    RV is at the upper limits of normal size,  Mitral annular calcification ( normal leaflets)   AVsclerotic Mild TVR   PA systolic pressures are estimated in high 40s.    GXT Lateral wall ischemia by scintigraphy.   EF  66% with no focal wall motion abnormalities.   No evidence of pharmacologically-induced ischemic dilation or increased lung uptake of radiopharmaceutical.    CATH 2021 Three-vessel CAD  2/3 patent bypass grafts  Failure of the JADE graft to the LAD with collaterals from the PDA    Neuro/Psych  (+) TIA (has sub critical Carotid diseasec), dizziness/light headedness, dementia (namenda ),     (-) seizures, neuromuscular diseaseCVA: denies   GI/Hepatic/Renal/Endo    (+)  GERD,    (-) no renal disease, diabetes, no thyroid disorder    Musculoskeletal     Abdominal    Substance History      OB/GYN          Other   arthritis,      (-) history of cancer (skin)  ROS/Med Hx Other: PLAVIX  Poor historian and poor memory   arteriopath c pulm htn      Phys Exam Other: bridge work removable lower right              Anesthesia Plan    ASA 4     general and MAC   (PFL aim for MAP >65 (POCD risk CVA risk))  intravenous induction     Anesthetic plan, all  risks, benefits, and alternatives have been provided, discussed and informed consent has been obtained with: patient.    Plan discussed with CRNA.

## 2021-10-06 NOTE — H&P
Pre-Op H&P  Marissa Carey  [id number]  1940    Chief complaint: Peripheral vascular disease    HPI:    Patient is a 81 y.o.female who presents with a history of bilateral renal artery stenosis as well as peripheral vascular disease.  This patient is a former smoker who stopped in 1997, at the time of an abdominal aortic aneurysm repair.  It appears she had a bifurcated graft placed from the renal arteries to the level of the iliacs at that time.  She has some neuropathy that is known and she has a CT scan which demonstrates a moderate degree of right common femoral artery stenosis.  She has some hyperemia of the toes and thickened nails but it is very difficult to elicit a true claudication history or a true rest pain or nonhealing ulcer history from this patient.  She is on a number of antihypertensive medications. Surgical intervention is recommended to evaluate for possible renal artery stenting. She is here today for aortogram with or without runoffs, possible stenting.    Review of Systems:  General ROS: negative for chills, fever or skin lesions;  No changes since last office visit.  Neg for recent sick exposure  Cardiovascular ROS: no chest pain or dyspnea on exertion; +CAD- s/p CABG x 3 vessels in 2006, +cardiac stents, +HTN, +PVD, s/p AAA repair, +dyslipidemia  Respiratory ROS: no cough or wheezing, +baseline SOB; former cigarette smoker (1/2 ppd x 30 years)- quit 1997, +COPD, +moderate asthma  Neuro ROS: +TIA, +carotid artery disease, +dementia    Allergies:   Allergies   Allergen Reactions   • Morphine Hallucinations     DOES NOT DO WELL WITH MOST ALL NARCOTICS   • Gabapentin Delirium       Home Meds:    No current facility-administered medications on file prior to encounter.     Current Outpatient Medications on File Prior to Encounter   Medication Sig Dispense Refill   • amLODIPine (NORVASC) 2.5 MG tablet Take 2.5 mg by mouth Every Night.     • aspirin 81 MG tablet Take 81 mg by mouth Every  Night.     • carvedilol (COREG) 6.25 MG tablet Take one table by mouth in AM if SBP > 130 and take one tablet by mouth in PM if SBP > 130 (Patient taking differently: Take 6.25 mg by mouth Every Night.) 60 tablet 5   • clopidogrel (PLAVIX) 75 MG tablet Take 75 mg by mouth Every Night.     • Docusate Calcium (STOOL SOFTENER PO) Take 1 tablet by mouth Daily As Needed.     • Ferrous Gluconate (IRON 27 PO) Take 65 mg by mouth Every Other Day.     • Flovent  MCG/ACT inhaler Inhale 2 puffs 2 (Two) Times a Day.     • furosemide (LASIX) 20 MG tablet Take 20 mg by mouth Daily As Needed.     • isosorbide mononitrate (IMDUR) 30 MG 24 hr tablet Take 1 tablet by mouth Daily. (Patient taking differently: Take 30 mg by mouth Every Night.) 90 tablet 3   • LINZESS 145 MCG capsule capsule Take 145 mcg by mouth As Needed.     • memantine (NAMENDA XR) 7 MG capsule sustained-release 24 hr extended release capsule Take 28 mg by mouth Every Morning.     • nitroglycerin (NITROSTAT) 0.4 MG SL tablet Place 0.4 mg under the tongue Every 5 (Five) Minutes As Needed. Place 1 tablet under the tongue every 5 minutes for up to doses as needed for chest pain . Call 911 if pain persists     • Omega-3 Fatty Acids (FISH OIL) 1200 MG capsule capsule Take 1,200 mg by mouth 2 (Two) Times a Day With Meals. With 360 mg omega 3 bid     • ranolazine (RANEXA) 500 MG 12 hr tablet Take 1 tablet by mouth every 12 (twelve) hours.     • rosuvastatin (CRESTOR) 20 MG tablet Take 20 mg by mouth Every Night.     • sertraline (ZOLOFT) 50 MG tablet Take 50 mg by mouth Every Night.     • TOVIAZ 4 MG tablet sustained-release 24 hour tablet Take 4 mg by mouth Daily.     • Ventolin  (90 Base) MCG/ACT inhaler Inhale 2 puffs Every 4 (Four) Hours As Needed.         PMH:   Past Medical History:   Diagnosis Date   • Aneurysm (HCC)     abdominal aortic aneurysm    • Aortic aneurysm (HCC)    • Arthritis    • Asthma    • Bilateral foot pain    • CAD, multiple vessel     • Carotid bruit    • Chest pain    • Claudication (HCC)    • COPD (chronic obstructive pulmonary disease) (HCC)    • COVID-19 vaccine administered 2021 - Pfzier    • COVID-19 vaccine administered 2021 - Pzfier    • Deviated septum    • Diminished pulses in lower extremity    • Edema    • Former smoker    • GERD (gastroesophageal reflux disease)    • History of transfusion    • Hyperlipidemia    • Hypertension    • Neuropathy    • Palpitations    • Pulmonary hypertension (HCC)    • Skin cancer     nose   • Stroke (HCC) 2006    TIAs many years ago, pt denies residual effects   • TIA (transient ischemic attack)    • Tinnitus    • UTI (urinary tract infection)    • Vision impairment      PSH:    Past Surgical History:   Procedure Laterality Date   • ABDOMINAL AORTIC ANEURYSM REPAIR     • CARDIAC CATHETERIZATION     • CARDIAC CATHETERIZATION N/A 2021    Procedure: Left Heart Cath;  Surgeon: Barry Bates MD;  [facility];  Service: Cardiovascular;  Laterality: N/A;   • CORONARY ANGIOPLASTY WITH STENT PLACEMENT     • CORONARY ARTERY BYPASS GRAFT      3 vessel    • HIP FRACTURE SURGERY Right    • KNEE SURGERY     • OTHER SURGICAL HISTORY      hip repair   • OTHER SURGICAL HISTORY      surgery for abdominal aortic aneurysm, double repair   • WRIST SURGERY Right     due to fx       Social History:   Tobacco:   Social History     Tobacco Use   Smoking Status Former Smoker   • Packs/day: 0.50   • Years: 30.00   • Pack years: 15.00   • Types: Cigarettes   • Quit date:    • Years since quittin.7   Smokeless Tobacco Never Used      Alcohol:     Social History     Substance and Sexual Activity   Alcohol Use Yes   • Alcohol/week: 7.0 standard drinks   • Types: 7 Glasses of wine per week       Vitals:           /76 (BP Location: Left arm, Patient Position: Lying) Comment: 116/78 RIGHT ARM  Pulse 68   Temp 98.2 °F (36.8 °C)  "(Temporal)   Resp 18   Ht 172.7 cm (68\")   Wt 92.5 kg (204 lb)   SpO2 96%   BMI 31.02 kg/m²     Physical Exam:  General Appearance:    Alert, cooperative, no distress, appears stated age   Head:    Normocephalic, without obvious abnormality, atraumatic   Lungs:     Clear to auscultation bilaterally, respirations unlabored    Heart:   Regular rate and rhythm, S1 and S2 normal, no murmur, rub    or gallop    Abdomen:    Soft, nontender.  +bowel sounds   Breast Exam:    deferred   Genitalia:    deferred   Extremities:   Extremities normal, atraumatic, no cyanosis or edema   Skin:   Skin color, texture, turgor normal, no rashes or lesions   Neurologic:   Grossly intact   Results Review  LABS:  Lab Results   Component Value Date    WBC 7.34 10/05/2021    HGB 12.3 10/05/2021    HCT 38.5 10/05/2021    MCV 93.4 10/05/2021     10/05/2021    GLUCOSE 103 (H) 10/05/2021    BUN 14 10/05/2021    CREATININE 1.03 (H) 10/05/2021    EGFRIFNONA 52 (L) 10/05/2021     10/05/2021    K 4.0 10/05/2021     10/05/2021    CO2 25.0 10/05/2021    MG 2.0 04/22/2021    CALCIUM 9.5 10/05/2021    PTT 26.4 10/05/2021    INR 1.11 10/05/2021       RADIOLOGY:  No radiology results for the last 3 days     I reviewed the patient's new clinical results.    Cancer Staging (if applicable)  Cancer Patient: __ yes _X_no __unknown; If yes, clinical stage T:__ N:__M:__, stage group or __N/A    Impression: Peripheral vascular disease    Plan: Aortogram with or without runoffs, possible stent    Agree with the above.  Plan for aortogram with runoff today.  Patient is aware of the risk of bleeding infection contrast reaction nephrotoxicity and agrees to proceed  DENIS Pool   10/06/21   7:16 AM EDT   "

## 2021-10-06 NOTE — ANESTHESIA POSTPROCEDURE EVALUATION
Patient: Marissa Carey    Procedure Summary     Date: 10/06/21 Room / Location:  [facility] /  [facility]    Anesthesia Start: 0800 Anesthesia Stop: 0841    Procedure: AORTAGRAM WITH RUNOFFS (N/A Abdomen) Diagnosis:       PAD (peripheral artery disease) ([facility])      (PAD (peripheral artery disease) (CMS/HCC) [I73.9])    Surgeons: Silvio Doe MD Provider: Ottoniel So MD    Anesthesia Type: general, MAC ASA Status: 4          Anesthesia Type: general, MAC    Vitals  Vitals Value Taken Time   BP     Temp     Pulse     Resp     SpO2 94 % 10/06/21 0840   Vitals shown include unvalidated device data.        Post Anesthesia Care and Evaluation    Patient location during evaluation: PACU  Patient participation: complete - patient participated  Level of consciousness: awake and alert  Pain score: 0  Pain management: adequate  Airway patency: patent  Anesthetic complications: No anesthetic complications  PONV Status: none  Cardiovascular status: hemodynamically stable and acceptable  Respiratory status: nonlabored ventilation, acceptable and nasal cannula  Hydration status: acceptable

## 2021-10-28 ENCOUNTER — OFFICE VISIT (OUTPATIENT)
Dept: CARDIOLOGY | Facility: CLINIC | Age: 81
End: 2021-10-28

## 2021-10-28 VITALS
WEIGHT: 208 LBS | DIASTOLIC BLOOD PRESSURE: 72 MMHG | HEIGHT: 67 IN | HEART RATE: 67 BPM | BODY MASS INDEX: 32.65 KG/M2 | OXYGEN SATURATION: 97 % | SYSTOLIC BLOOD PRESSURE: 129 MMHG | TEMPERATURE: 97.6 F

## 2021-10-28 DIAGNOSIS — E78.5 HYPERLIPIDEMIA, UNSPECIFIED HYPERLIPIDEMIA TYPE: ICD-10-CM

## 2021-10-28 DIAGNOSIS — I51.89 GRADE II DIASTOLIC DYSFUNCTION: ICD-10-CM

## 2021-10-28 DIAGNOSIS — I73.9 PAD (PERIPHERAL ARTERY DISEASE) (HCC): ICD-10-CM

## 2021-10-28 DIAGNOSIS — I70.1 RENAL ARTERY STENOSIS (HCC): ICD-10-CM

## 2021-10-28 DIAGNOSIS — Z98.890 HISTORY OF AAA (ABDOMINAL AORTIC ANEURYSM) REPAIR: ICD-10-CM

## 2021-10-28 DIAGNOSIS — R42 DIZZINESS: ICD-10-CM

## 2021-10-28 DIAGNOSIS — R60.9 PERIPHERAL EDEMA: ICD-10-CM

## 2021-10-28 DIAGNOSIS — I25.10 CAD, MULTIPLE VESSEL: Primary | ICD-10-CM

## 2021-10-28 DIAGNOSIS — I65.23 BILATERAL CAROTID ARTERY STENOSIS: ICD-10-CM

## 2021-10-28 DIAGNOSIS — R07.89 OTHER CHEST PAIN: ICD-10-CM

## 2021-10-28 DIAGNOSIS — R06.02 SHORTNESS OF BREATH: ICD-10-CM

## 2021-10-28 DIAGNOSIS — I27.20 PULMONARY HYPERTENSION (HCC): ICD-10-CM

## 2021-10-28 DIAGNOSIS — I10 PRIMARY HYPERTENSION: ICD-10-CM

## 2021-10-28 PROCEDURE — 99214 OFFICE O/P EST MOD 30 MIN: CPT | Performed by: NURSE PRACTITIONER

## 2021-10-28 NOTE — PROGRESS NOTES
Subjective   Marissa Carey is a 81 y.o. female     Chief Complaint   Patient presents with   • Follow-up   • Coronary Artery Disease       HPI    Problem List:    1) Coronary Artery Disease   a. Cath in 2005 by Dr. Galloway revealing multivessel coronary artery disease s/p CABG by Dr. Soto with HSEN   to first diagonal, SVG to OM, SVG to PDA   b. Cath in 2012 revealing triple vessel disease with LIMA to LAD occluded with collaterals from RCA to LAD, with other grafts patents and medical management recommended    c. Cath 7/23/15 - Stent RCA   d  Left heart cath 11/26/19-right coronary artery was diffusely diseased throughout its length and the posterior descending coronary artery was totally occluded, seen at the junction of proximal thirds of the vessels widely patent no hemodynamic significant stenosis were identified beyond that level except the occluded PDA, the circumflex was occluded at the junction of its proximal portions, LAD was occluded after 2 small proximal diagonal as it was diffusely diseased prior to that level of occlusion, vein graft to the PDA was patent, vein graft to the distal circumflex was patent, bifurcated obtuse marginal which was diffusely diseased beyond the graft touchdown site which was without discrete high-grade stenosis, there was retrograde filled to the level of the body of the circumflex there after intragraft nitroglycerin there was some improvement in caliber of the graft to the OM, subclavian artery graft was performed and inability to pass the catheter beyond the proximal left subclavian, adequate opacification did demonstrate a string sign and LIMA bypass graft, EF 50 to 55%, LVEDP 20-22  E.  Stress test 4/23/2021-small inferoapical and a large inferolateral wall ischemic defect, post-rest EF 59%, borderline transischemic dilation ratio of 1.18  F.  Left heart cath 4/30/2021-RCA 30% plaque mid vessel and distal 60% plaque the PDA is 100% occluded; SVG to obtuse patent,  SVG to PDA widely patent with good collateral flow to LAD, LIMA to LAD nonfunctional  2) Hypertension  2.1) Echo 10/4/16 - mild LVH; EF 60-65%; DD II; early MAC, mild MR, TR and IA; PA 20-25   2.2) echo 10/28/19-borderline LVH, diastolic dysfunction 2, trivial to mild MR, mild TR, PA in the 40s, EF 66 to 70%  3) Dyslipidemia  4) Obesity BMI 37  5) Cerebrovascular disease   a. Hx of TIA with workup in 2008 which was negative  6) Peripheral Vascular disease    a. SHABBIR - < 20% MARIAN; < 50% LICA   b. SHABBIR 10/4/16 - 16-49% MARIAN and LICA and bifurcation; antegrade flow   C.  Carotid artery ultrasound 5/4/2020 % stenosis of the distal right internal artery and mid left internal carotid artery, antegrade flow both vertebral arteries  7) Osteoarthritis  8) GERD  9) Raynaud's disease.  10) Anxiety/Depression  11) Event Monitor 7/7-7/20/15 - SB - NSR with PVCs; 4/29-5/12/2021-normal sinus rhythm      12)  AAA w/repair 1997        A. Abdominal US 7/24/14 - 2.4 CM        B. Abdominal ultrasound 10/20/17-previous aneurysm repair, graft is patent, mid aorta measures up to 2.4 cm        C.  Abdominal ultrasound 5/7/19-no evidence of persistent abdominal aortic aneurysm with maximal AP diameter of 2 cm, no evidence of significant aortic stenosis, iliac arteries are patent (Repeat every 2 yrs)        D.  Abdominal aorta ultrasound 5/4/2021-appears to be no residual aneurysmal dilation of the aorta with maximum dimension of 2.4 cm, no large perivascular clear space to suggest endoleak or rupture, repeat in 2 years      13) postprandial hypotension     14) Pfizer vaccination 2/13/2021; 3/13/2021      15)  Emphysema, Dr. Moreno        16) PVD       16.1) BLE arterial duplex at [facility]. 5-28-15 mild atherosclerotic change in BLE, no sign. plaque or stenoses       16.2) bilateral lower extremity arterial ultrasound 5/4/2021-50 to 75% stenosis proximal right common femoral artery, 50% greater stenosis by Doppler sampling, 50 to 99%  stenosis in the distal left superficial femoral artery extending into the left popliteal artery potentially hemodynamic significant stenosis in the right common femoral artery and distal left superficial femoral artery      16.3)  CTA of abd with run off 5/28/2021 -greater than 80% stenosis in the renal arteries bilaterally, less than 30% stenosis celiac artery, 50% stenosis of the inferior mesenteric artery, occluded inferior mesenteric artery, 50% stenosis of the internal iliac arteries bilaterally, 70% stenosis common femoral artery on the right, less than 30% stenosis deep femoral artery, 30% stenosis of the superficial femoral artery, greater than 90% stenosis of the popliteal, left has less than 30% stenosis the common femoral, deep femoral artery, 30% of the superficial femoral artery, 50% of the popliteal artery, infarct in the lower pole of the left kidney, moderate hiatal hernia        17.)  Renal artery stenosis by CT 5/28/2021        18.)  Moderate hiatal hernia by CT 5/28/2021    Patient is an 81-year-old female who presents today for follow-up with her  at her side.  She says on occasion she will have some midsternum tightness in her chest when she lays down.  Her  says it last for like a second and goes away.  It is nothing that bothers her and she never has to take nitroglycerin.  She does have some pounding whenever she gets short of breath.  She has dizziness on occasion when she changes positions.  She denies any presyncope, syncope, orthopnea or PND.  She does get swelling in her legs and she only has to use her water pill maybe 2-3 times a month.  Patient says she does have shortness of breath walking any distance and this has not changed.    Current Outpatient Medications on File Prior to Visit   Medication Sig Dispense Refill   • amLODIPine (NORVASC) 2.5 MG tablet Take 2.5 mg by mouth Every Night.     • aspirin 81 MG tablet Take 81 mg by mouth Every Night.     • carvedilol  (COREG) 6.25 MG tablet Take one table by mouth in AM if SBP > 130 and take one tablet by mouth in PM if SBP > 130 (Patient taking differently: Take 6.25 mg by mouth Every Night.) 60 tablet 5   • clopidogrel (PLAVIX) 75 MG tablet Take 75 mg by mouth Every Night.     • Docusate Calcium (STOOL SOFTENER PO) Take 1 tablet by mouth Daily As Needed.     • Ferrous Gluconate (IRON 27 PO) Take 65 mg by mouth Every Other Day. Every other day     • Flovent  MCG/ACT inhaler Inhale 2 puffs 2 (Two) Times a Day.     • furosemide (LASIX) 20 MG tablet Take 20 mg by mouth Daily As Needed.     • isosorbide mononitrate (IMDUR) 30 MG 24 hr tablet Take 1 tablet by mouth Daily. (Patient taking differently: Take 30 mg by mouth Every Night.) 90 tablet 3   • LINZESS 145 MCG capsule capsule Take 145 mcg by mouth As Needed.     • memantine (NAMENDA XR) 7 MG capsule sustained-release 24 hr extended release capsule Take 28 mg by mouth Every Morning.     • nitroglycerin (NITROSTAT) 0.4 MG SL tablet Place 0.4 mg under the tongue Every 5 (Five) Minutes As Needed. Place 1 tablet under the tongue every 5 minutes for up to doses as needed for chest pain . Call 911 if pain persists     • Omega-3 Fatty Acids (FISH OIL) 1200 MG capsule capsule Take 1,200 mg by mouth 2 (Two) Times a Day With Meals. With 360 mg omega 3 bid     • ranolazine (RANEXA) 500 MG 12 hr tablet Take 1 tablet by mouth every 12 (twelve) hours.     • rosuvastatin (CRESTOR) 20 MG tablet Take 20 mg by mouth Every Night.     • sertraline (ZOLOFT) 50 MG tablet Take 50 mg by mouth Every Night.     • Ventolin  (90 Base) MCG/ACT inhaler Inhale 2 puffs Every 4 (Four) Hours As Needed.     • TOVIAZ 4 MG tablet sustained-release 24 hour tablet Take 4 mg by mouth Daily.       No current facility-administered medications on file prior to visit.       ALLERGIES    Morphine and Gabapentin    Past Medical History:   Diagnosis Date   • Aneurysm (HCC)     abdominal aortic aneurysm    •  Aortic aneurysm (HCC)    • Arthritis    • Asthma    • Bilateral foot pain    • CAD, multiple vessel    • Carotid bruit    • Chest pain    • Claudication (HCC)    • COPD (chronic obstructive pulmonary disease) ([facility])    • COVID-19 vaccine administered 2021 - Pfzier    • COVID-19 vaccine administered 2021 - Pzfier  ( booster 10/2021 - pfizer    • Deviated septum    • Diminished pulses in lower extremity    • Edema    • Former smoker    • GERD (gastroesophageal reflux disease)    • History of transfusion    • Hyperlipidemia    • Hypertension    • Neuropathy    • Palpitations    • Pulmonary hypertension (HCC)    • Skin cancer     nose   • Stroke ([facility]) 2006    TIAs many years ago, pt denies residual effects   • TIA (transient ischemic attack)    • Tinnitus    • UTI (urinary tract infection)    • Vision impairment        Social History     Socioeconomic History   • Marital status:    • Number of children: 3   Tobacco Use   • Smoking status: Former Smoker     Packs/day: 0.50     Years: 30.00     Pack years: 15.00     Types: Cigarettes     Quit date:      Years since quittin.8   • Smokeless tobacco: Never Used   Vaping Use   • Vaping Use: Never used   Substance and Sexual Activity   • Alcohol use: Yes     Alcohol/week: 7.0 standard drinks     Types: 7 Glasses of wine per week   • Drug use: No   • Sexual activity: Defer       Family History   Problem Relation Age of Onset   • Other Mother         acute myocardial infarction   • Aneurysm Mother    • Goiter Mother    • Aneurysm Father    • Sudden death Father    • Heart failure Sister    • Other Other         leukemia   • Cancer Other         thyroid       Review of Systems   Constitutional: Negative for appetite change, chills, diaphoresis, fatigue and fever.   HENT: Negative for congestion, rhinorrhea and sore throat.    Eyes: Positive for visual disturbance (glasses).   Respiratory: Positive for cough (dry ) and  "shortness of breath (walking at any distance ). Negative for chest tightness and wheezing.    Cardiovascular: Positive for chest pain (midsternum when she lays down, very minimal, nothing that bothers her, no nitro), palpitations (will have pounding when she gets short of breath ) and leg swelling (legs, feet and ankles at times swelling goes down at night and if she keeps them proped up; 2-3 x a month ).   Gastrointestinal: Positive for abdominal pain (left lower side pain off and on ( sharp pain ) ) and constipation (pt has meds to help ). Negative for blood in stool, diarrhea, nausea and vomiting.   Endocrine: Negative for cold intolerance and heat intolerance.   Genitourinary: Positive for frequency (several times durning the day and night ). Negative for difficulty urinating, dysuria, hematuria and urgency.   Musculoskeletal: Positive for arthralgias (through out the body ) and back pain (entire back ). Negative for joint swelling, neck pain and neck stiffness.   Skin: Positive for wound (right leg ). Negative for color change, pallor and rash.   Allergic/Immunologic: Positive for environmental allergies (seasonal ). Negative for food allergies.   Neurological: Positive for dizziness (once in awhile when she gets up ), weakness (legs pt uses walker at home and cane when she is out ) and numbness (right knee up ). Negative for light-headedness and headaches.   Hematological: Bruises/bleeds easily (bruises and bleeds easy ).   Psychiatric/Behavioral: Positive for sleep disturbance (oxygen at night ).       Objective   /72 (BP Location: Left arm)   Pulse 67   Temp 97.6 °F (36.4 °C)   Ht 170.2 cm (67\")   Wt 94.3 kg (208 lb)   SpO2 97%   BMI 32.58 kg/m²   Vitals:    10/28/21 1054   BP: 129/72   BP Location: Left arm   Pulse: 67   Temp: 97.6 °F (36.4 °C)   SpO2: 97%   Weight: 94.3 kg (208 lb)   Height: 170.2 cm (67\")      Lab Results (most recent)     None        Physical Exam  Vitals reviewed. "   Constitutional:       General: She is awake.      Appearance: Normal appearance. She is well-developed and well-groomed. She is obese.   HENT:      Head: Normocephalic.   Eyes:      General: Lids are normal.      Comments: Wears glasses    Neck:      Vascular: No carotid bruit, hepatojugular reflux or JVD.   Cardiovascular:      Rate and Rhythm: Normal rate and regular rhythm.      Pulses:           Radial pulses are 2+ on the right side and 2+ on the left side.        Dorsalis pedis pulses are 2+ on the left side.        Posterior tibial pulses are 2+ on the left side.      Heart sounds: Normal heart sounds.      Comments: Decreased pedal pulses RLE.  Pulmonary:      Effort: Pulmonary effort is normal.      Breath sounds: Normal breath sounds and air entry.   Abdominal:      General: Bowel sounds are normal.      Palpations: Abdomen is soft.   Musculoskeletal:      Right lower leg: No edema.      Left lower leg: No edema.      Comments: Uses a cane    Skin:     General: Skin is warm and dry.      Comments: Scars BLE    Neurological:      Mental Status: She is alert and oriented to person, place, and time.   Psychiatric:         Attention and Perception: Attention and perception normal.         Mood and Affect: Mood and affect normal.         Speech: Speech normal.         Behavior: Behavior normal. Behavior is cooperative.         Thought Content: Thought content normal.         Cognition and Memory: Cognition and memory normal.         Judgment: Judgment normal.         Procedure   Procedures         Assessment/Plan      Diagnosis Plan   1. CAD, multiple vessel     2. History of AAA (abdominal aortic aneurysm) repair     3. Primary hypertension     4. Hyperlipidemia, unspecified hyperlipidemia type     5. PAD (peripheral artery disease) ([facility])     6. Renal artery stenosis ([facility])     7. Bilateral carotid artery stenosis     8. Shortness of breath     9. Pulmonary hypertension (HCC)     10. Peripheral edema     11.  Other chest pain     12. Dizziness     13. Grade II diastolic dysfunction         Return in about 6 months (around 4/28/2022).    CAD-patient's on aspirin, beta, Plavix and statin.  Hypertension-patient's on amlodipine and carvedilol and doing well.  AAA-stable.  Hyperlipidemia-patient is on Crestor and omega-3 and doing well.  PAD-patient's on aspirin and statin.  Renal artery disease-patient's on aspirin and statin.  Bilateral carotid artery disease-patient's on aspirin and statin.  Shortness of breath-stable.  Pulmonary hypertension-stable.  Chest pain-they are not concern did not feel like this is problematic.  They do not feel like any additional work-up is warranted at this time.  She was encouraged use nitro as needed for chest pain no resolution to go to the ER.  She is on Ranexa and isosorbide.  Dizziness-stable.  Diastolic dysfunction/peripheral edema-patient uses Lasix as needed.  She will continue her medication regimen.  Follow-up in 6 months or sooner if any changes.       Marissa Carey  reports that she quit smoking about 24 years ago. Her smoking use included cigarettes. She has a 15.00 pack-year smoking history. She has never used smokeless tobacco..Advance Care Planning   Advance Care Planning   ACP discussion was declined by the patient. Patient has an advance directive (not in EMR), copy requested. Patient did not bring med list or medicine bottles to appointment, med list has been reviewed and updated based on patient's knowledge of their meds.   Electronically signed by:

## 2021-10-28 NOTE — PATIENT INSTRUCTIONS
Peripheral Vascular Disease    Peripheral vascular disease (PVD) is a disease of the blood vessels that carry blood from the heart to the rest of the body. PVD is also called peripheral artery disease (PAD) or poor circulation. PVD affects most of the body. But it affects the legs and feet the most.  PVD can lead to acute limb ischemia. This happens when there is a sudden stop of blood flow to an arm or leg. This is a medical emergency.  What are the causes?  The most common cause of PVD is a buildup of a fatty substance (plaque) inside your arteries. This decreases blood flow. Plaque can break off and block blood in a smaller artery. This can lead to acute limb ischemia.  Other common causes of PVD include:  · Blood clots inside the blood vessels.  · Injuries to blood vessels.  · Irritation and swelling of blood vessels.  · Sudden tightening of the blood vessel (spasms).  What increases the risk?  · A family history of PVD.  · Medical conditions, including:  ? High cholesterol.  ? Diabetes.  ? High blood pressure.  ? Heart disease.  ? Past problems with blood clots.  ? Past injury, such as burns or a broken bone.  · Other conditions, such as:  ? Buerger's disease. This is caused by swollen or irritated blood vessels in your hands and feet.  ? Arthritis.  ? Birth defects that affect the arteries in your legs.  ? Kidney disease.  · Using tobacco or nicotine products.  · Not getting enough exercise.  · Being very overweight (obese).  · Being 50 years old or older.  What are the signs or symptoms?  · Cramps in your butt, legs, and feet.  · Pain and weakness in your legs when you are active that goes away when you rest.  · Leg pain when at rest.  · Leg numbness, tingling, or weakness.  · Coldness in a leg or foot, especially when compared with the other leg or foot.  · Skin or hair changes. These can include:  ? Hair loss.  ? Shiny skin.  ? Pale or bluish skin.  ? Thick toenails.  · Being unable to get or keep an  erection.  · Tiredness (fatigue).  · Weak pulse or no pulse in the feet.  · Wounds and sores on the toes, feet, or legs. These take longer to heal.  How is this treated?  Underlying causes are treated first. Other conditions, like diabetes, high cholesterol, and blood pressure, are also treated. Treatment may include:  · Lifestyle changes, such as:  ? Quitting smoking.  ? Getting regular exercise.  ? Having a diet low in fat and cholesterol.  ? Not drinking alcohol.  · Taking medicines, such as:  ? Blood thinners.  ? Medicines to improve blood flow.  ? Medicines to improve your blood cholesterol.  · Procedures to:  ? Open the arteries and restore blood flow.  ? Insert a small mesh tube (stent) to keep a blocked vessel open.  ? Create a new path for blood to flow to the body (peripheral bypass).  ? Remove dead tissue from a wound.  ? Remove an affected leg or arm.  Follow these instructions at home:  Medicines  · Take over-the-counter and prescription medicines only as told by your doctor.  · If you are taking blood thinners:  ? Talk with your doctor before you take any medicines that have aspirin, or NSAIDs, such as ibuprofen.  ? Take medicines exactly as told. Take them at the same time each day.  ? Avoid doing things that could hurt or bruise you. Take action to prevent falls.  ? Wear an alert bracelet or carry a card that shows you are taking blood thinners.  Lifestyle         · Get regular exercise. Ask your doctor about how to stay active.  · Talk with your doctor about keeping a healthy weight. If needed, ask about losing weight.  · Eat a diet that is low in fat and cholesterol. If you need help, talk with your doctor.  · Do not drink alcohol.  · Do not smoke or use any products that contain nicotine or tobacco. If you need help quitting, ask your doctor.  General instructions  · Take good care of your feet. To do this:  ? Wear shoes that fit well and feel good.  ? Check your feet often for any cuts or  "sores.  · Get a flu shot (influenza vaccine) each year.  · Keep all follow-up visits.  Where to find more information  · Society for Vascular Surgery: vascular.org  · American Heart Association: heart.org  · National Heart, Lung, and Blood [place]: nhlbi.nih.gov  Contact a doctor if:  · You have cramps in your legs when you walk.  · You have leg pain when you rest.  · Your leg or foot feels cold.  · Your skin changes.  · You cannot get or keep an erection.  · You have cuts or sores on your legs or feet that do not heal.  Get help right away if:  · You have sudden changes in the color and feeling of your arms or legs, such as:  ? Your arm or leg turns cold, numb, and blue.  ? Your arm or leg becomes red, warm, swollen, painful, or numb.  · You have any signs of a stroke. \"BE FAST\" is an easy way to remember the main warning signs:  ? B - Balance. Dizziness, sudden trouble walking, or loss of balance.  ? E - Eyes. Trouble seeing or a change in how you see.  ? F - Face. Sudden weakness or loss of feeling of the face. The face or eyelid may droop on one side.  ? A - Arms. Weakness or loss of feeling in an arm. This happens all of a sudden and most often on one side of the body.  ? S - Speech. Sudden trouble speaking, slurred speech, or trouble understanding what people say.  ? T - Time. Time to call emergency services. Write down what time symptoms started.  · You have other signs of a stroke, such as:  ? A sudden, very bad headache with no known cause.  ? Feeling like you may vomit (nausea).  ? Vomiting.  ? A seizure.  · You have chest pain or trouble breathing.  These symptoms may be an emergency. Get help right away. Call your local emergency services (911 in the [place]  · Do not wait to see if the symptoms will go away.  · Do not drive yourself to the hospital.  Summary  · Peripheral vascular disease (PVD) is a disease of the blood vessels.  · PVD affects the legs and feet the most.  · Symptoms may include leg " pain or leg numbness, tingling, and weakness.  · Treatment may include lifestyle changes, medicines, and procedures.  This information is not intended to replace advice given to you by your health care provider. Make sure you discuss any questions you have with your health care provider.  Document Revised: 06/21/2021 Document Reviewed: 06/21/2021  [employer] Patient Education © 2021 [employer]    Advance Care Planning and Advance Directives     You make decisions on a daily basis - decisions about where you want to live, your career, your home, your life. Perhaps one of the most important decisions you face is your choice for future medical care. Take time to talk with your family and your healthcare team and start planning today.  Advance Care Planning is a process that can help you:  · Understand possible future healthcare decisions in light of your own experiences  · Reflect on those decision in light of your goals and values  · Discuss your decisions with those closest to you and the healthcare professionals that care for you  · Make a plan by creating a document that reflects your wishes    Surrogate Decision Maker  In the event of a medical emergency, which has left you unable to communicate or to make your own decisions, you would need someone to make decisions for you.  It is important to discuss your preferences for medical treatment with this person while you are in good health.     Qualities of a surrogate decision maker:  • Willing to take on this role and responsibility  • Knows what you want for future medical care  • Willing to follow your wishes even if they don't agree with them  • Able to make difficult medical decisions under stressful circumstances    Advance Directives  These are legal documents you can create that will guide your healthcare team and decision maker(s) when needed. These documents can be stored in the electronic medical record.    · Living Will - a legal document to guide your  care if you have a terminal condition or a serious illness and are unable to communicate. States vary by statute in document names/types, but most forms may include one or more of the following:        -  Directions regarding life-prolonging treatments        -  Directions regarding artificially provided nutrition/hydration        -  Choosing a healthcare decision maker        -  Direction regarding organ/tissue donation    · Durable Power of  for Healthcare - this document names an -in-fact to make medical decisions for you, but it may also allow this person to make personal and financial decisions for you. Please seek the advice of an  if you need this type of document.    **Advance Directives are not required and no one may discriminate against you if you do not sign one.    Medical Orders  Many states allow specific forms/orders signed by your physician to record your wishes for medical treatment in your current state of health. This form, signed in personal communication with your physician, addresses resuscitation and other medical interventions that you may or may not want.      For more information or to schedule a time with a [facility] Advance Care Planning Facilitator contact: [email or web address] or call [phone] and someone will contact you directly.

## 2022-04-25 ENCOUNTER — TELEPHONE (OUTPATIENT)
Dept: CARDIOLOGY | Facility: CLINIC | Age: 82
End: 2022-04-25

## 2022-04-25 ENCOUNTER — LAB (OUTPATIENT)
Dept: CARDIOLOGY | Facility: CLINIC | Age: 82
End: 2022-04-25

## 2022-04-25 ENCOUNTER — OFFICE VISIT (OUTPATIENT)
Dept: CARDIOLOGY | Facility: CLINIC | Age: 82
End: 2022-04-25

## 2022-04-25 VITALS
WEIGHT: 213 LBS | SYSTOLIC BLOOD PRESSURE: 115 MMHG | OXYGEN SATURATION: 96 % | HEART RATE: 69 BPM | TEMPERATURE: 97.6 F | BODY MASS INDEX: 34.23 KG/M2 | DIASTOLIC BLOOD PRESSURE: 63 MMHG | HEIGHT: 66 IN

## 2022-04-25 DIAGNOSIS — I25.10 CAD, MULTIPLE VESSEL: Primary | ICD-10-CM

## 2022-04-25 DIAGNOSIS — I10 PRIMARY HYPERTENSION: Primary | ICD-10-CM

## 2022-04-25 DIAGNOSIS — I27.20 PULMONARY HYPERTENSION: ICD-10-CM

## 2022-04-25 DIAGNOSIS — R06.02 SHORTNESS OF BREATH: ICD-10-CM

## 2022-04-25 DIAGNOSIS — I73.9 PAD (PERIPHERAL ARTERY DISEASE): ICD-10-CM

## 2022-04-25 DIAGNOSIS — Z98.890 HISTORY OF AAA (ABDOMINAL AORTIC ANEURYSM) REPAIR: ICD-10-CM

## 2022-04-25 DIAGNOSIS — R60.9 PERIPHERAL EDEMA: ICD-10-CM

## 2022-04-25 DIAGNOSIS — R07.89 CHEST TIGHTNESS: ICD-10-CM

## 2022-04-25 DIAGNOSIS — R00.2 PALPITATIONS: ICD-10-CM

## 2022-04-25 DIAGNOSIS — I51.89 GRADE II DIASTOLIC DYSFUNCTION: ICD-10-CM

## 2022-04-25 DIAGNOSIS — I65.23 BILATERAL CAROTID ARTERY STENOSIS: ICD-10-CM

## 2022-04-25 DIAGNOSIS — I10 PRIMARY HYPERTENSION: ICD-10-CM

## 2022-04-25 DIAGNOSIS — E78.5 HYPERLIPIDEMIA, UNSPECIFIED HYPERLIPIDEMIA TYPE: ICD-10-CM

## 2022-04-25 DIAGNOSIS — R07.89 OTHER CHEST PAIN: ICD-10-CM

## 2022-04-25 DIAGNOSIS — I70.1 RENAL ARTERY STENOSIS: ICD-10-CM

## 2022-04-25 LAB
ANION GAP SERPL CALCULATED.3IONS-SCNC: 11.5 MMOL/L (ref 5–15)
BUN SERPL-MCNC: 31 MG/DL (ref 8–23)
BUN/CREAT SERPL: 23.5 (ref 7–25)
CALCIUM SPEC-SCNC: 9.6 MG/DL (ref 8.6–10.5)
CHLORIDE SERPL-SCNC: 105 MMOL/L (ref 98–107)
CO2 SERPL-SCNC: 25.5 MMOL/L (ref 22–29)
CREAT SERPL-MCNC: 1.32 MG/DL (ref 0.57–1)
EGFRCR SERPLBLD CKD-EPI 2021: 40.9 ML/MIN/1.73
GLUCOSE SERPL-MCNC: 93 MG/DL (ref 65–99)
NT-PROBNP SERPL-MCNC: 390.2 PG/ML (ref 0–1800)
POTASSIUM SERPL-SCNC: 4.1 MMOL/L (ref 3.5–5.2)
SODIUM SERPL-SCNC: 142 MMOL/L (ref 136–145)

## 2022-04-25 PROCEDURE — 83880 ASSAY OF NATRIURETIC PEPTIDE: CPT | Performed by: NURSE PRACTITIONER

## 2022-04-25 PROCEDURE — 36415 COLL VENOUS BLD VENIPUNCTURE: CPT

## 2022-04-25 PROCEDURE — 99214 OFFICE O/P EST MOD 30 MIN: CPT | Performed by: NURSE PRACTITIONER

## 2022-04-25 PROCEDURE — 80048 BASIC METABOLIC PNL TOTAL CA: CPT | Performed by: NURSE PRACTITIONER

## 2022-04-25 NOTE — PROGRESS NOTES
Subjective   Marissa Carey is a 81 y.o. female     Chief Complaint   Patient presents with   • Follow-up       HPI    Problem List:    1) Coronary Artery Disease   a. Cath in 2005 by Dr. Galloway revealing multivessel coronary artery disease s/p CABG by Dr. Soto with SHEN   to first diagonal, SVG to OM, SVG to PDA   b. Cath in 2012 revealing triple vessel disease with LIMA to LAD occluded with collaterals from RCA to LAD, with other grafts patents and medical management recommended    c. Cath 7/23/15 - Stent RCA   d  Left heart cath 11/26/19-right coronary artery was diffusely diseased throughout its length and the posterior descending coronary artery was totally occluded, seen at the junction of proximal thirds of the vessels widely patent no hemodynamic significant stenosis were identified beyond that level except the occluded PDA, the circumflex was occluded at the junction of its proximal portions, LAD was occluded after 2 small proximal diagonal as it was diffusely diseased prior to that level of occlusion, vein graft to the PDA was patent, vein graft to the distal circumflex was patent, bifurcated obtuse marginal which was diffusely diseased beyond the graft touchdown site which was without discrete high-grade stenosis, there was retrograde filled to the level of the body of the circumflex there after intragraft nitroglycerin there was some improvement in caliber of the graft to the OM, subclavian artery graft was performed and inability to pass the catheter beyond the proximal left subclavian, adequate opacification did demonstrate a string sign and LIMA bypass graft, EF 50 to 55%, LVEDP 20-22  E.  Stress test 4/23/2021-small inferoapical and a large inferolateral wall ischemic defect, post-rest EF 59%, borderline transischemic dilation ratio of 1.18  F.  Left heart cath 4/30/2021-RCA 30% plaque mid vessel and distal 60% plaque the PDA is 100% occluded; SVG to obtuse patent, SVG to PDA widely patent with  good collateral flow to LAD, LIMA to LAD nonfunctional  2) Hypertension  2.1) Echo 10/4/16 - mild LVH; EF 60-65%; DD II; early MAC, mild MR, TR and ID; PA 20-25   2.2) echo 10/28/19-borderline LVH, diastolic dysfunction 2, trivial to mild MR, mild TR, PA in the 40s, EF 66 to 70%  3) Dyslipidemia  4) Obesity BMI 37  5) Cerebrovascular disease   a. Hx of TIA with workup in 2008 which was negative  6) Peripheral Vascular disease    a. SHABBIR - < 20% MARIAN; < 50% LICA   b. SHABBIR 10/4/16 - 16-49% MARIAN and LICA and bifurcation; antegrade flow   C.  Carotid artery ultrasound 5/4/2020 % stenosis of the distal right internal artery and mid left internal carotid artery, antegrade flow both vertebral arteries  7) Osteoarthritis  8) GERD  9) Raynaud's disease.  10) Anxiety/Depression  11) Event Monitor 7/7-7/20/15 - SB - NSR with PVCs; 4/29-5/12/2021-normal sinus rhythm      12)  AAA w/repair 1997        A. Abdominal US 7/24/14 - 2.4 CM        B. Abdominal ultrasound 10/20/17-previous aneurysm repair, graft is patent, mid aorta measures up to 2.4 cm        C.  Abdominal ultrasound 5/7/19-no evidence of persistent abdominal aortic aneurysm with maximal AP diameter of 2 cm, no evidence of significant aortic stenosis, iliac arteries are patent (Repeat every 2 yrs)        D.  Abdominal aorta ultrasound 5/4/2021-appears to be no residual aneurysmal dilation of the aorta with maximum dimension of 2.4 cm, no large perivascular clear space to suggest endoleak or rupture, repeat in 2 years      13) postprandial hypotension     14) Pfizer vaccination 2/13/2021; 3/13/2021      15)  Emphysema, Dr. Moreno        16) PVD       16.1) BLE arterial duplex at [facility]. 5-28-15 mild atherosclerotic change in BLE, no sign. plaque or stenoses       16.2) bilateral lower extremity arterial ultrasound 5/4/2021-50 to 75% stenosis proximal right common femoral artery, 50% greater stenosis by Doppler sampling, 50 to 99% stenosis in the distal left  superficial femoral artery extending into the left popliteal artery potentially hemodynamic significant stenosis in the right common femoral artery and distal left superficial femoral artery      16.3)  CTA of abd with run off 5/28/2021 -greater than 80% stenosis in the renal arteries bilaterally, less than 30% stenosis celiac artery, 50% stenosis of the inferior mesenteric artery, occluded inferior mesenteric artery, 50% stenosis of the internal iliac arteries bilaterally, 70% stenosis common femoral artery on the right, less than 30% stenosis deep femoral artery, 30% stenosis of the superficial femoral artery, greater than 90% stenosis of the popliteal, left has less than 30% stenosis the common femoral, deep femoral artery, 30% of the superficial femoral artery, 50% of the popliteal artery, infarct in the lower pole of the left kidney, moderate hiatal hernia        17.)  Renal artery stenosis by CT 5/28/2021        18.)  Moderate hiatal hernia by CT 5/28/2021    Patient is an 81-year-old female who presents today for follow-up with her  at her side.  She does get pressure in her chest across it typically when she lays down at night.  She says she has had this since she had bypass surgery.  She does not feel like it is changed.  She may have a little more shortness of breath with it when it happens but no other symptoms.  She does not take nitro.  Patient does feel like her heart races when she exerts more than normal.  She does have dizziness that she says is more balance.  She denies any presyncope, syncope, orthopnea or PND.  She has swelling in her legs it typically goes down when she elevates them.  She takes her water pill if she needs to.  She does have shortness of breath walking any distance or doing any activity.  She does have quite a bit of fatigue.      Current Outpatient Medications on File Prior to Visit   Medication Sig Dispense Refill   • amLODIPine (NORVASC) 2.5 MG tablet Take 2.5 mg by  mouth Every Night.     • aspirin 81 MG tablet Take 81 mg by mouth Every Night.     • carvedilol (COREG) 6.25 MG tablet Take one table by mouth in AM if SBP > 130 and take one tablet by mouth in PM if SBP > 130 (Patient taking differently: Take 6.25 mg by mouth Every Night.) 60 tablet 5   • clopidogrel (PLAVIX) 75 MG tablet Take 75 mg by mouth Every Night.     • Docusate Calcium (STOOL SOFTENER PO) Take 1 tablet by mouth Daily As Needed.     • Ferrous Gluconate (IRON 27 PO) Take 65 mg by mouth Every Other Day. Every other day     • Flovent  MCG/ACT inhaler Inhale 2 puffs 2 (Two) Times a Day.     • furosemide (LASIX) 20 MG tablet Take 20 mg by mouth Daily As Needed.     • isosorbide mononitrate (IMDUR) 30 MG 24 hr tablet Take 1 tablet by mouth Daily. (Patient taking differently: Take 30 mg by mouth Every Night.) 90 tablet 3   • LINZESS 145 MCG capsule capsule Take 145 mcg by mouth As Needed.     • memantine (NAMENDA XR) 7 MG capsule sustained-release 24 hr extended release capsule Take 28 mg by mouth Every Morning.     • nitroglycerin (NITROSTAT) 0.4 MG SL tablet Place 0.4 mg under the tongue Every 5 (Five) Minutes As Needed. Place 1 tablet under the tongue every 5 minutes for up to doses as needed for chest pain . Call 911 if pain persists     • Omega-3 Fatty Acids (FISH OIL) 1200 MG capsule capsule Take 1,200 mg by mouth 2 (Two) Times a Day With Meals. With 360 mg omega 3 bid     • ranolazine (RANEXA) 500 MG 12 hr tablet Take 1 tablet by mouth every 12 (twelve) hours.     • rosuvastatin (CRESTOR) 20 MG tablet Take 20 mg by mouth Every Night.     • sertraline (ZOLOFT) 50 MG tablet Take 50 mg by mouth Every Night.     • [DISCONTINUED] TOVIAZ 4 MG tablet sustained-release 24 hour tablet Take 4 mg by mouth Daily.     • [DISCONTINUED] Ventolin  (90 Base) MCG/ACT inhaler Inhale 2 puffs Every 4 (Four) Hours As Needed.       No current facility-administered medications on file prior to visit.        ALLERGIES    Morphine and Gabapentin    Past Medical History:   Diagnosis Date   • Aneurysm (HCC)     abdominal aortic aneurysm    • Aortic aneurysm (HCC)    • Arthritis    • Asthma    • Bilateral foot pain    • CAD, multiple vessel    • Carotid bruit    • Chest pain    • Claudication (HCC)    • COPD (chronic obstructive pulmonary disease) (HCC)    • COVID-19 vaccine administered 2021 - Pfzier    • COVID-19 vaccine administered 2021 - Pzfier  ( booster 10/2021 - pfizer    • Deviated septum    • Diminished pulses in lower extremity    • Edema    • Former smoker    • GERD (gastroesophageal reflux disease)    • History of transfusion    • Hyperlipidemia    • Hypertension    • Neuropathy    • Palpitations    • Pulmonary hypertension (HCC)    • Skin cancer     nose   • Stroke ([facility]) 2006    TIAs many years ago, pt denies residual effects   • TIA (transient ischemic attack)    • Tinnitus    • UTI (urinary tract infection)    • Vision impairment        Social History     Socioeconomic History   • Marital status:    • Number of children: 3   Tobacco Use   • Smoking status: Former Smoker     Packs/day: 0.50     Years: 30.00     Pack years: 15.00     Types: Cigarettes     Quit date:      Years since quittin.3   • Smokeless tobacco: Never Used   Vaping Use   • Vaping Use: Never used   Substance and Sexual Activity   • Alcohol use: Yes     Alcohol/week: 7.0 standard drinks     Types: 7 Glasses of wine per week   • Drug use: No   • Sexual activity: Defer       Family History   Problem Relation Age of Onset   • Other Mother         acute myocardial infarction   • Aneurysm Mother    • Goiter Mother    • Aneurysm Father    • Sudden death Father    • Heart failure Sister    • Other Other         leukemia   • Cancer Other         thyroid       Review of Systems   Constitutional: Positive for fatigue (tired alot of days ). Negative for appetite change, chills, diaphoresis  and fever.   HENT: Positive for congestion (nasal , chest and head bill due to allergies ). Negative for rhinorrhea and sore throat.    Eyes: Positive for visual disturbance (glasses ).   Respiratory: Positive for chest tightness (pressure come and goes since she has had heart sx worse in the evening when she lays down; when laying down; very tender to touch ) and shortness of breath (walking at any distance or with any activity ). Negative for cough and wheezing.    Cardiovascular: Positive for palpitations (races when she has alot of activity ) and leg swelling (legs, feet and ankles swelling goes down at night if she puts them up and takes her water pills ). Negative for chest pain.   Gastrointestinal: Positive for abdominal pain (lower abd pain on both sides ) and constipation (medication to help ). Negative for blood in stool, diarrhea, nausea and vomiting.   Endocrine: Negative for cold intolerance and heat intolerance.   Genitourinary: Negative for difficulty urinating, dysuria, frequency, hematuria and urgency.   Musculoskeletal: Positive for arthralgias (hands , fingers , shoulders ), back pain (lower back ( chronic) ) and neck pain (both sides of her neck ). Negative for joint swelling and neck stiffness.   Skin: Negative for color change, pallor, rash and wound.   Allergic/Immunologic: Positive for environmental allergies (seasonal ). Negative for food allergies.   Neurological: Positive for dizziness (dizziness due to her balance ), weakness (legs, she uses a cane to walk and help her balace ) and numbness (feet (burns ) right leg numb below her right knee ). Negative for light-headedness and headaches.   Hematological: Bruises/bleeds easily (bruises and bleeds easy ).   Psychiatric/Behavioral: Positive for sleep disturbance (night begum at times where she can't sleep ).       Objective   /63 (BP Location: Left arm, Patient Position: Sitting)   Pulse 69   Temp 97.6 °F (36.4 °C)   Ht 167.6 cm  "(66\")   Wt 96.6 kg (213 lb)   SpO2 96%   BMI 34.38 kg/m²   Vitals:    04/25/22 1049   BP: 115/63   BP Location: Left arm   Patient Position: Sitting   Pulse: 69   Temp: 97.6 °F (36.4 °C)   SpO2: 96%   Weight: 96.6 kg (213 lb)   Height: 167.6 cm (66\")      Lab Results (most recent)     None        Physical Exam  Vitals reviewed.   Constitutional:       General: She is awake.      Appearance: Normal appearance. She is well-developed and well-groomed. She is obese.   HENT:      Head: Normocephalic.   Eyes:      General: Lids are normal.      Comments: Wears glasses    Neck:      Vascular: No carotid bruit, hepatojugular reflux or JVD.   Cardiovascular:      Rate and Rhythm: Normal rate and regular rhythm.      Pulses:           Radial pulses are 2+ on the right side and 2+ on the left side.        Dorsalis pedis pulses are 2+ on the right side and 2+ on the left side.        Posterior tibial pulses are 2+ on the right side.      Heart sounds: Normal heart sounds.   Pulmonary:      Effort: Pulmonary effort is normal.      Breath sounds: Normal air entry. Examination of the right-lower field reveals decreased breath sounds. Examination of the left-lower field reveals decreased breath sounds. Decreased breath sounds present.   Abdominal:      General: Bowel sounds are normal.      Palpations: Abdomen is soft.   Musculoskeletal:      Right lower leg: Edema (trace) present.      Left lower leg: Edema (trace) present.      Comments: Uses a cane    Skin:     General: Skin is warm and dry.   Neurological:      Mental Status: She is alert and oriented to person, place, and time.   Psychiatric:         Attention and Perception: Attention and perception normal.         Mood and Affect: Mood and affect normal.         Speech: Speech normal.         Behavior: Behavior normal. Behavior is cooperative.         Thought Content: Thought content normal.         Cognition and Memory: Cognition normal. Memory is impaired.         " Judgment: Judgment normal.         Procedure   Procedures         Assessment/Plan      Diagnosis Plan   1. CAD, multiple vessel     2. Primary hypertension     3. Hyperlipidemia, unspecified hyperlipidemia type     4. History of AAA (abdominal aortic aneurysm) repair     5. PAD (peripheral artery disease) ([facility])     6. Renal artery stenosis ([facility])     7. Bilateral carotid artery stenosis     8. Shortness of breath  BNP    Basic Metabolic Panel   9. Pulmonary hypertension (HCC)     10. Peripheral edema  BNP    Basic Metabolic Panel   11. Grade II diastolic dysfunction  BNP    Basic Metabolic Panel   12. Chest tightness         Return in about 6 months (around 10/25/2022).    CAD-patient's on aspirin, beta, Plavix and statin.  We are awaiting labs from PCP.  Hypertension-patient's on amlodipine and carvedilol and doing well.  Hyperlipidemia-patient is on Crestor and again awaiting labs from PCP.  AAA-patient needs repeat scan May 2023.  PAD-patient's on aspirin and statin.  Renal artery stenosis-patient's on aspirin and statin.  Carotid artery disease-patient's on aspirin and statin.  Shortness of breath/peripheral edema/diastolic dysfunction-patient will get a BNP and BMP.  Pulmonary hypertension-patient follows Dr. Moreno.  Peripheral edema/diastolic dysfunction-patient is on Lasix.  Chest tightness-patient's on Imdur, amlodipine, beta-blocker and isosorbide.  She feels like this is stable.  She will continue her medication regimen.  She was encouraged use nitro as needed for chest pain no resolution to go to the ER.  She will follow-up in 6 months or sooner if any changes.         Marissa JOHN Carey  reports that she quit smoking about 25 years ago. Her smoking use included cigarettes. She has a 15.00 pack-year smoking history. She has never used smokeless tobacco..Advance Care Planning   ACP discussion was declined by the patient. Patient has an advance directive (not in EMR), copy requested.    Patient brought in  medicine list to appointment, it's been reviewed with patient and med list was updated in the chart.     Electronically signed by:

## 2022-04-25 NOTE — TELEPHONE ENCOUNTER
----- Message from DENIS Kingsley sent at 4/25/2022  3:36 PM EDT -----  Please advise patient.  Fluid marker is within normal range.  Her creatinine however is elevated.  Forwarding to PCP.  Have her repeat a BMP in 2 weeks.  Make sure she is staying hydrated.      Pt was advised of lab results and she will repeat labs here in 2 weeks , repeat labs ordered .      Creatinine   0.57 - 1.00 mg/dL 1.32 High       proBNP   0.0 - 1,800.0 pg/mL 390.2      BLESSING Fischer

## 2022-05-09 ENCOUNTER — LAB (OUTPATIENT)
Dept: LAB | Facility: HOSPITAL | Age: 82
End: 2022-05-09

## 2022-05-09 DIAGNOSIS — R07.89 OTHER CHEST PAIN: ICD-10-CM

## 2022-05-09 DIAGNOSIS — I65.23 BILATERAL CAROTID ARTERY STENOSIS: ICD-10-CM

## 2022-05-09 DIAGNOSIS — I10 PRIMARY HYPERTENSION: ICD-10-CM

## 2022-05-09 DIAGNOSIS — R00.2 PALPITATIONS: ICD-10-CM

## 2022-05-09 LAB
ANION GAP SERPL CALCULATED.3IONS-SCNC: 12.9 MMOL/L (ref 5–15)
BUN SERPL-MCNC: 22 MG/DL (ref 8–23)
BUN/CREAT SERPL: 15.6 (ref 7–25)
CALCIUM SPEC-SCNC: 9.9 MG/DL (ref 8.6–10.5)
CHLORIDE SERPL-SCNC: 104 MMOL/L (ref 98–107)
CO2 SERPL-SCNC: 25.1 MMOL/L (ref 22–29)
CREAT SERPL-MCNC: 1.41 MG/DL (ref 0.57–1)
EGFRCR SERPLBLD CKD-EPI 2021: 37.8 ML/MIN/1.73
GLUCOSE SERPL-MCNC: 101 MG/DL (ref 65–99)
POTASSIUM SERPL-SCNC: 4.4 MMOL/L (ref 3.5–5.2)
SODIUM SERPL-SCNC: 142 MMOL/L (ref 136–145)

## 2022-05-09 PROCEDURE — 80048 BASIC METABOLIC PNL TOTAL CA: CPT

## 2022-05-09 PROCEDURE — 36415 COLL VENOUS BLD VENIPUNCTURE: CPT

## 2022-05-10 ENCOUNTER — TELEPHONE (OUTPATIENT)
Dept: CARDIOLOGY | Facility: CLINIC | Age: 82
End: 2022-05-10

## 2022-05-10 DIAGNOSIS — R79.89 CREATININE ELEVATION: Primary | ICD-10-CM

## 2022-05-10 NOTE — TELEPHONE ENCOUNTER
----- Message from DENIS Kingsley sent at 5/10/2022  8:24 AM EDT -----  Patient cr is up.  Has she been staying hydrated?  UTI symptoms? Currently or recently been on ABX?  When was last time she used lasix?  Forwarded to PCP repeat a BMP in 2 weeks.

## 2022-05-10 NOTE — TELEPHONE ENCOUNTER
Creatinine   0.57 - 1.00 mg/dL 1.41 High   1.32 High   1.03 High   1.05 High   0.96        First attempt to reach pt. Left a voicemail for pt to return my call at [phone].

## 2022-05-11 NOTE — TELEPHONE ENCOUNTER
Informed pt and her spouse of results and the message from Rekha.  · Staying hydrated  · Gets recurrent UTIs, denies UTI sx currently  · Last used Lasix 2 weeks or so ago   Pt's  verbalized understanding of results being sent to PCP and labs being needed in two weeks. He agreed that if CREAT continues to be elevated that she needs to see someone for eval.

## 2022-06-13 ENCOUNTER — LAB (OUTPATIENT)
Dept: LAB | Facility: HOSPITAL | Age: 82
End: 2022-06-13

## 2022-06-13 DIAGNOSIS — R79.89 CREATININE ELEVATION: ICD-10-CM

## 2022-06-13 LAB
ANION GAP SERPL CALCULATED.3IONS-SCNC: 12 MMOL/L (ref 5–15)
BUN SERPL-MCNC: 14 MG/DL (ref 8–23)
BUN/CREAT SERPL: 10.6 (ref 7–25)
CALCIUM SPEC-SCNC: 9.2 MG/DL (ref 8.6–10.5)
CHLORIDE SERPL-SCNC: 102 MMOL/L (ref 98–107)
CO2 SERPL-SCNC: 24 MMOL/L (ref 22–29)
CREAT SERPL-MCNC: 1.32 MG/DL (ref 0.57–1)
EGFRCR SERPLBLD CKD-EPI 2021: 40.9 ML/MIN/1.73
GLUCOSE SERPL-MCNC: 112 MG/DL (ref 65–99)
POTASSIUM SERPL-SCNC: 4.3 MMOL/L (ref 3.5–5.2)
SODIUM SERPL-SCNC: 138 MMOL/L (ref 136–145)

## 2022-06-13 PROCEDURE — 36415 COLL VENOUS BLD VENIPUNCTURE: CPT

## 2022-06-13 PROCEDURE — 80048 BASIC METABOLIC PNL TOTAL CA: CPT

## 2022-06-13 RX ORDER — FUROSEMIDE 20 MG/1
20 TABLET ORAL DAILY PRN
Qty: 90 TABLET | Refills: 3 | Status: SHIPPED | OUTPATIENT
Start: 2022-06-13

## 2022-06-13 NOTE — TELEPHONE ENCOUNTER
Pt's  walked in and requested RF of Lasix for pt. Requested 90-day supply be sent to [facility].       I sent it in, as requested, and advised him to let us know if she needs any sent to local px. He verbalized understanding.

## 2022-06-14 ENCOUNTER — TELEPHONE (OUTPATIENT)
Dept: CARDIOLOGY | Facility: CLINIC | Age: 82
End: 2022-06-14

## 2022-06-14 NOTE — TELEPHONE ENCOUNTER
----- Message from DENIS Kingsley sent at 6/14/2022  6:10 AM EDT -----  Please advise patient that her creatinine has come back down a little bit.  I forwarded results to PCP.      Left detailed mess for pt regarding the above mess/labs if pt has any questions she was advised to return call and I would go over results again with her all other labs ( Normal)except her glucose but unsure if pt was fasting .  if pt returns call she will need to be advised that her CR has come down some . Labs all sent to PCP    Labs -   Creatinine   0.57 - 1.00 mg/dL 1.32 High   1.41 High      Glucose   65 - 99 mg/dL 112 High        BLESSING Fischer

## 2022-09-06 NOTE — PATIENT INSTRUCTIONS
Obesity, Adult  Obesity is the condition of having too much total body fat. Being overweight or obese means that your weight is greater than what is considered healthy for your body size. Obesity is determined by a measurement called BMI. BMI is an estimate of body fat and is calculated from height and weight. For adults, a BMI of 30 or higher is considered obese.  Obesity can eventually lead to other health concerns and major illnesses, including:  · Stroke.  · Coronary artery disease (CAD).  · Type 2 diabetes.  · Some types of cancer, including cancers of the colon, breast, uterus, and gallbladder.  · Osteoarthritis.  · High blood pressure (hypertension).  · High cholesterol.  · Sleep apnea.  · Gallbladder stones.  · Infertility problems.  What are the causes?  The main cause of obesity is taking in (consuming) more calories than your body uses for energy. Other factors that contribute to this condition may include:  · Being born with genes that make you more likely to become obese.  · Having a medical condition that causes obesity. These conditions include:  ¨ Hypothyroidism.  ¨ Polycystic ovarian syndrome (PCOS).  ¨ Binge-eating disorder.  ¨ Cushing syndrome.  · Taking certain medicines, such as steroids, antidepressants, and seizure medicines.  · Not being physically active (sedentary lifestyle).  · Living where there are limited places to exercise safely or buy healthy foods.  · Not getting enough sleep.  What increases the risk?  The following factors may increase your risk of this condition:  · Having a family history of obesity.  · Being a woman of -American descent.  · Being a man of  descent.  What are the signs or symptoms?  Having excessive body fat is the main symptom of this condition.  How is this diagnosed?  This condition may be diagnosed based on:  · Your symptoms.  · Your medical history.  · A physical exam. Your health care provider may measure:  ¨ Your BMI. If you are an adult  with a BMI between 25 and less than 30, you are considered overweight. If you are an adult with a BMI of 30 or higher, you are considered obese.  ¨ The distances around your hips and your waist (circumferences). These may be compared to each other to help diagnose your condition.  ¨ Your skinfold thickness. Your health care provider may gently pinch a fold of your skin and measure it.  How is this treated?  Treatment for this condition often includes changing your lifestyle. Treatment may include some or all of the following:  · Dietary changes. Work with your health care provider and a dietitian to set a weight-loss goal that is healthy and reasonable for you. Dietary changes may include eating:  ¨ Smaller portions. A portion size is the amount of a particular food that is healthy for you to eat at one time. This varies from person to person.  ¨ Low-calorie or low-fat options.  ¨ More whole grains, fruits, and vegetables.  · Regular physical activity. This may include aerobic activity (cardio) and strength training.  · Medicine to help you lose weight. Your health care provider may prescribe medicine if you are unable to lose 1 pound a week after 6 weeks of eating more healthily and doing more physical activity.  · Surgery. Surgical options may include gastric banding and gastric bypass. Surgery may be done if:  ¨ Other treatments have not helped to improve your condition.  ¨ You have a BMI of 40 or higher.  ¨ You have life-threatening health problems related to obesity.  Follow these instructions at home:     Eating and drinking     · Follow recommendations from your health care provider about what you eat and drink. Your health care provider may advise you to:  ¨ Limit fast foods, sweets, and processed snack foods.  ¨ Choose low-fat options, such as low-fat milk instead of whole milk.  ¨ Eat 5 or more servings of fruits or vegetables every day.  ¨ Eat at home more often. This gives you more control over what you  eat.  ¨ Choose healthy foods when you eat out.  ¨ Learn what a healthy portion size is.  ¨ Keep low-fat snacks on hand.  ¨ Avoid sugary drinks, such as soda, fruit juice, iced tea sweetened with sugar, and flavored milk.  ¨ Eat a healthy breakfast.  · Drink enough water to keep your urine clear or pale yellow.  · Do not go without eating for long periods of time (do not fast) or follow a fad diet. Fasting and fad diets can be unhealthy and even dangerous.  Physical Activity   · Exercise regularly, as told by your health care provider. Ask your health care provider what types of exercise are safe for you and how often you should exercise.  · Warm up and stretch before being active.  · Cool down and stretch after being active.  · Rest between periods of activity.  Lifestyle   · Limit the time that you spend in front of your TV, computer, or video game system.  · Find ways to reward yourself that do not involve food.  · Limit alcohol intake to no more than 1 drink a day for nonpregnant women and 2 drinks a day for men. One drink equals 12 oz of beer, 5 oz of wine, or 1½ oz of hard liquor.  General instructions   · Keep a weight loss journal to keep track of the food you eat and how much you exercise you get.  · Take over-the-counter and prescription medicines only as told by your health care provider.  · Take vitamins and supplements only as told by your health care provider.  · Consider joining a support group. Your health care provider may be able to recommend a support group.  · Keep all follow-up visits as told by your health care provider. This is important.  Contact a health care provider if:  · You are unable to meet your weight loss goal after 6 weeks of dietary and lifestyle changes.  This information is not intended to replace advice given to you by your health care provider. Make sure you discuss any questions you have with your health care provider.  Document Released: 01/25/2006 Document Revised:  05/22/2017 Document Reviewed: 10/05/2016  [employer] Interactive Patient Education © 2017 [employer]  MyPlate from [employer]  The general, healthful diet is based on the 2010 Dietary Guidelines for Americans. The amount of food you need to eat from each food group depends on your age, sex, and level of physical activity and can be individualized by a dietitian. Go to ChooseMyPlate.gov for more information.  What do I need to know about the MyPlate plan?  · Enjoy your food, but eat less.  · Avoid oversized portions.  ¨ ½ of your plate should include fruits and vegetables.  ¨ ¼ of your plate should be grains.  ¨ ¼ of your plate should be protein.  Grains   · Make at least half of your grains whole grains.  · For a 2,000 calorie daily food plan, eat 6 oz every day.  · 1 oz is about 1 slice bread, 1 cup cereal, or ½ cup cooked rice, cereal, or pasta.  Vegetables   · Make half your plate fruits and vegetables.  · For a 2,000 calorie daily food plan, eat 2½ cups every day.  · 1 cup is about 1 cup raw or cooked vegetables or vegetable juice or 2 cups raw leafy greens.  Fruits   · Make half your plate fruits and vegetables.  · For a 2,000 calorie daily food plan, eat 2 cups every day.  · 1 cup is about 1 cup fruit or 100% fruit juice or ½ cup dried fruit.  Protein   · For a 2,000 calorie daily food plan, eat 5½ oz every day.  · 1 oz is about 1 oz meat, poultry, or fish, ¼ cup cooked beans, 1 egg, 1 Tbsp peanut butter, or ½ oz nuts or seeds.  Dairy   · Switch to fat-free or low-fat (1%) milk.  · For a 2,000 calorie daily food plan, eat 3 cups every day.  · 1 cup is about 1 cup milk or yogurt or soy milk (soy beverage), 1½ oz natural cheese, or 2 oz processed cheese.  Fats, Oils, and Empty Calories   · Only small amounts of oils are recommended.  · Empty calories are calories from solid fats or added sugars.  · Compare sodium in foods like soup, bread, and frozen meals. Choose the foods with lower numbers.  · Drink water instead  of sugary drinks.  What foods can I eat?  Grains   Whole grains such as whole wheat, quinoa, millet, and bulgur. Bread, rolls, and pasta made from whole grains. Brown or wild rice. Hot or cold cereals made from whole grains and without added sugar.  Vegetables   All fresh vegetables, especially fresh red, dark green, or orange vegetables. Peas and beans. Low-sodium frozen or canned vegetables prepared without added salt. Low-sodium vegetable juices.  Fruits   All fresh, frozen, and dried fruits. Canned fruit packed in water or fruit juice without added sugar. Fruit juices without added sugar.  Meats and Other Protein Sources   Boiled, baked, or grilled lean meat trimmed of fat. Skinless poultry. Fresh seafood and shellfish. Canned seafood packed in water. Unsalted nuts and unsalted nut butters. Tofu. Dried beans and pea. Eggs.  Dairy   Low-fat or fat-free milk, yogurt, and cheeses.  Sweets and Desserts   Frozen desserts made from low-fat milk.  Fats and Oils   Olive, peanut, and canola oils and margarine. Salad dressing and mayonnaise made from these oils.  Other   Soups and casseroles made from allowed ingredients and without added fat or salt.  The items listed above may not be a complete list of recommended foods or beverages. Contact your dietitian for more options.   What foods are not recommended?  Grains   Sweetened, low-fiber cereals. Packaged baked goods. Snack crackers and chips. Cheese crackers, butter crackers, and biscuits. Frozen waffles, sweet breads, doughnuts, pastries, packaged baking mixes, pancakes, cakes, and cookies.  Vegetables   Regular canned or frozen vegetables or vegetables prepared with salt. Canned tomatoes. Canned tomato sauce. Fried vegetables. Vegetables in cream sauce or cheese sauce.  Fruits   Fruits packed in syrup or made with added sugar.  Meats and Other Protein Sources   Marbled or fatty meats such as ribs. Poultry with skin. Fried meats, poultry, eggs, or fish. Sausages, hot  "dogs, and deli meats such as pastrami, bologna, or salami.  Dairy   Whole milk, cream, cheeses made from whole milk, sour cream. Ice cream or yogurt made from whole milk or with added sugar.  Beverages   For adults, no more than one alcoholic drink per day. Regular soft drinks or other sugary beverages. Juice drinks.  Sweets and Desserts   Sugary or fatty desserts, candy, and other sweets.  Fats and Oils   Solid shortening or partially hydrogenated oils. Solid margarine. Margarine that contains trans fats. Butter.  The items listed above may not be a complete list of foods and beverages to avoid. Contact your dietitian for more information.   This information is not intended to replace advice given to you by your health care provider. Make sure you discuss any questions you have with your health care provider.  Document Released: 01/06/2009 Document Revised: 05/25/2017 Document Reviewed: 11/26/2014  [employer] Interactive Patient Education © 2017 [employer]    Hypertension  Hypertension, commonly called high blood pressure, is when the force of blood pumping through the arteries is too strong. The arteries are the blood vessels that carry blood from the heart throughout the body. Hypertension forces the heart to work harder to pump blood and may cause arteries to become narrow or stiff. Having untreated or uncontrolled hypertension can cause heart attacks, strokes, kidney disease, and other problems.  A blood pressure reading consists of a higher number over a lower number. Ideally, your blood pressure should be below 120/80. The first (\"top\") number is called the systolic pressure. It is a measure of the pressure in your arteries as your heart beats. The second (\"bottom\") number is called the diastolic pressure. It is a measure of the pressure in your arteries as the heart relaxes.  What are the causes?  The cause of this condition is not known.  What increases the risk?  Some risk factors for high blood pressure " are under your control. Others are not.  Factors you can change   · Smoking.  · Having type 2 diabetes mellitus, high cholesterol, or both.  · Not getting enough exercise or physical activity.  · Being overweight.  · Having too much fat, sugar, calories, or salt (sodium) in your diet.  · Drinking too much alcohol.  Factors that are difficult or impossible to change   · Having chronic kidney disease.  · Having a family history of high blood pressure.  · Age. Risk increases with age.  · Race. You may be at higher risk if you are -American.  · Gender. Men are at higher risk than women before age 45. After age 65, women are at higher risk than men.  · Having obstructive sleep apnea.  · Stress.  What are the signs or symptoms?  Extremely high blood pressure (hypertensive crisis) may cause:  · Headache.  · Anxiety.  · Shortness of breath.  · Nosebleed.  · Nausea and vomiting.  · Severe chest pain.  · Jerky movements you cannot control (seizures).  How is this diagnosed?  This condition is diagnosed by measuring your blood pressure while you are seated, with your arm resting on a surface. The cuff of the blood pressure monitor will be placed directly against the skin of your upper arm at the level of your heart. It should be measured at least twice using the same arm. Certain conditions can cause a difference in blood pressure between your right and left arms.  Certain factors can cause blood pressure readings to be lower or higher than normal (elevated) for a short period of time:  · When your blood pressure is higher when you are in a health care provider's office than when you are at home, this is called white coat hypertension. Most people with this condition do not need medicines.  · When your blood pressure is higher at home than when you are in a health care provider's office, this is called masked hypertension. Most people with this condition may need medicines to control blood pressure.  If you have a high  blood pressure reading during one visit or you have normal blood pressure with other risk factors:  · You may be asked to return on a different day to have your blood pressure checked again.  · You may be asked to monitor your blood pressure at home for 1 week or longer.  If you are diagnosed with hypertension, you may have other blood or imaging tests to help your health care provider understand your overall risk for other conditions.  How is this treated?  This condition is treated by making healthy lifestyle changes, such as eating healthy foods, exercising more, and reducing your alcohol intake. Your health care provider may prescribe medicine if lifestyle changes are not enough to get your blood pressure under control, and if:  · Your systolic blood pressure is above 130.  · Your diastolic blood pressure is above 80.  Your personal target blood pressure may vary depending on your medical conditions, your age, and other factors.  Follow these instructions at home:  Eating and drinking   · Eat a diet that is high in fiber and potassium, and low in sodium, added sugar, and fat. An example eating plan is called the DASH (Dietary Approaches to Stop Hypertension) diet. To eat this way:  ¨ Eat plenty of fresh fruits and vegetables. Try to fill half of your plate at each meal with fruits and vegetables.  ¨ Eat whole grains, such as whole wheat pasta, brown rice, or whole grain bread. Fill about one quarter of your plate with whole grains.  ¨ Eat or drink low-fat dairy products, such as skim milk or low-fat yogurt.  ¨ Avoid fatty cuts of meat, processed or cured meats, and poultry with skin. Fill about one quarter of your plate with lean proteins, such as fish, chicken without skin, beans, eggs, and tofu.  ¨ Avoid premade and processed foods. These tend to be higher in sodium, added sugar, and fat.  · Reduce your daily sodium intake. Most people with hypertension should eat less than 1,500 mg of sodium a day.  · Limit  alcohol intake to no more than 1 drink a day for nonpregnant women and 2 drinks a day for men. One drink equals 12 oz of beer, 5 oz of wine, or 1½ oz of hard liquor.  Lifestyle   · Work with your health care provider to maintain a healthy body weight or to lose weight. Ask what an ideal weight is for you.  · Get at least 30 minutes of exercise that causes your heart to beat faster (aerobic exercise) most days of the week. Activities may include walking, swimming, or biking.  · Include exercise to strengthen your muscles (resistance exercise), such as pilates or lifting weights, as part of your weekly exercise routine. Try to do these types of exercises for 30 minutes at least 3 days a week.  · Do not use any products that contain nicotine or tobacco, such as cigarettes and e-cigarettes. If you need help quitting, ask your health care provider.  · Monitor your blood pressure at home as told by your health care provider.  · Keep all follow-up visits as told by your health care provider. This is important.  Medicines   · Take over-the-counter and prescription medicines only as told by your health care provider. Follow directions carefully. Blood pressure medicines must be taken as prescribed.  · Do not skip doses of blood pressure medicine. Doing this puts you at risk for problems and can make the medicine less effective.  · Ask your health care provider about side effects or reactions to medicines that you should watch for.  Contact a health care provider if:  · You think you are having a reaction to a medicine you are taking.  · You have headaches that keep coming back (recurring).  · You feel dizzy.  · You have swelling in your ankles.  · You have trouble with your vision.  Get help right away if:  · You develop a severe headache or confusion.  · You have unusual weakness or numbness.  · You feel faint.  · You have severe pain in your chest or abdomen.  · You vomit repeatedly.  · You have trouble  breathing.  Summary  · Hypertension is when the force of blood pumping through your arteries is too strong. If this condition is not controlled, it may put you at risk for serious complications.  · Your personal target blood pressure may vary depending on your medical conditions, your age, and other factors. For most people, a normal blood pressure is less than 120/80.  · Hypertension is treated with lifestyle changes, medicines, or a combination of both. Lifestyle changes include weight loss, eating a healthy, low-sodium diet, exercising more, and limiting alcohol.  This information is not intended to replace advice given to you by your health care provider. Make sure you discuss any questions you have with your health care provider.  Document Released: 12/18/2006 Document Revised: 11/15/2017 Document Reviewed: 11/15/2017  [employer] Interactive Patient Education © 2017 [employer]     ambulatory

## 2022-10-26 ENCOUNTER — OFFICE VISIT (OUTPATIENT)
Dept: CARDIOLOGY | Facility: CLINIC | Age: 82
End: 2022-10-26

## 2022-10-26 ENCOUNTER — TELEPHONE (OUTPATIENT)
Dept: CARDIOLOGY | Facility: CLINIC | Age: 82
End: 2022-10-26

## 2022-10-26 ENCOUNTER — LAB (OUTPATIENT)
Dept: CARDIOLOGY | Facility: CLINIC | Age: 82
End: 2022-10-26

## 2022-10-26 VITALS
OXYGEN SATURATION: 97 % | TEMPERATURE: 96.6 F | DIASTOLIC BLOOD PRESSURE: 77 MMHG | HEART RATE: 71 BPM | WEIGHT: 203 LBS | SYSTOLIC BLOOD PRESSURE: 150 MMHG | BODY MASS INDEX: 32.62 KG/M2 | HEIGHT: 66 IN

## 2022-10-26 DIAGNOSIS — I73.9 PAD (PERIPHERAL ARTERY DISEASE): ICD-10-CM

## 2022-10-26 DIAGNOSIS — I10 PRIMARY HYPERTENSION: ICD-10-CM

## 2022-10-26 DIAGNOSIS — I65.23 BILATERAL CAROTID ARTERY STENOSIS: ICD-10-CM

## 2022-10-26 DIAGNOSIS — I25.10 CAD, MULTIPLE VESSEL: ICD-10-CM

## 2022-10-26 DIAGNOSIS — E78.5 HYPERLIPIDEMIA, UNSPECIFIED HYPERLIPIDEMIA TYPE: ICD-10-CM

## 2022-10-26 DIAGNOSIS — I70.1 RENAL ARTERY STENOSIS: ICD-10-CM

## 2022-10-26 DIAGNOSIS — R00.2 PALPITATIONS: ICD-10-CM

## 2022-10-26 DIAGNOSIS — R07.89 OTHER CHEST PAIN: Primary | ICD-10-CM

## 2022-10-26 DIAGNOSIS — I51.89 GRADE II DIASTOLIC DYSFUNCTION: ICD-10-CM

## 2022-10-26 DIAGNOSIS — R60.9 PERIPHERAL EDEMA: ICD-10-CM

## 2022-10-26 DIAGNOSIS — R07.89 OTHER CHEST PAIN: ICD-10-CM

## 2022-10-26 DIAGNOSIS — R06.02 SHORTNESS OF BREATH: ICD-10-CM

## 2022-10-26 LAB
ANION GAP SERPL CALCULATED.3IONS-SCNC: 10.9 MMOL/L (ref 5–15)
BUN SERPL-MCNC: 24 MG/DL (ref 8–23)
BUN/CREAT SERPL: 14.2 (ref 7–25)
CALCIUM SPEC-SCNC: 9.4 MG/DL (ref 8.6–10.5)
CHLORIDE SERPL-SCNC: 100 MMOL/L (ref 98–107)
CO2 SERPL-SCNC: 28.1 MMOL/L (ref 22–29)
CREAT SERPL-MCNC: 1.69 MG/DL (ref 0.57–1)
EGFRCR SERPLBLD CKD-EPI 2021: 30.2 ML/MIN/1.73
GLUCOSE SERPL-MCNC: 112 MG/DL (ref 65–99)
NT-PROBNP SERPL-MCNC: 619.8 PG/ML (ref 0–1800)
POTASSIUM SERPL-SCNC: 3.7 MMOL/L (ref 3.5–5.2)
SODIUM SERPL-SCNC: 139 MMOL/L (ref 136–145)

## 2022-10-26 PROCEDURE — 93000 ELECTROCARDIOGRAM COMPLETE: CPT | Performed by: NURSE PRACTITIONER

## 2022-10-26 PROCEDURE — 99214 OFFICE O/P EST MOD 30 MIN: CPT | Performed by: NURSE PRACTITIONER

## 2022-10-26 PROCEDURE — 80048 BASIC METABOLIC PNL TOTAL CA: CPT | Performed by: NURSE PRACTITIONER

## 2022-10-26 PROCEDURE — 83880 ASSAY OF NATRIURETIC PEPTIDE: CPT | Performed by: NURSE PRACTITIONER

## 2022-10-26 RX ORDER — CARVEDILOL 6.25 MG/1
6.25 TABLET ORAL NIGHTLY
Start: 2022-10-26 | End: 2022-11-01 | Stop reason: SDUPTHER

## 2022-10-26 NOTE — PROGRESS NOTES
Subjective   Marissa Carey is a 82 y.o. female     Chief Complaint   Patient presents with   • Follow-up       HPI    Problem List:    1) Coronary Artery Disease   a. Cath in 2005 by Dr. Galloway revealing multivessel coronary artery disease s/p CABG by Dr. Soto with SHEN   to first diagonal, SVG to OM, SVG to PDA   b. Cath in 2012 revealing triple vessel disease with LIMA to LAD occluded with collaterals from RCA to LAD, with other grafts patents and medical management recommended    c. Cath 7/23/15 - Stent RCA   d  Left heart cath 11/26/19-right coronary artery was diffusely diseased throughout its length and the posterior descending coronary artery was totally occluded, seen at the junction of proximal thirds of the vessels widely patent no hemodynamic significant stenosis were identified beyond that level except the occluded PDA, the circumflex was occluded at the junction of its proximal portions, LAD was occluded after 2 small proximal diagonal as it was diffusely diseased prior to that level of occlusion, vein graft to the PDA was patent, vein graft to the distal circumflex was patent, bifurcated obtuse marginal which was diffusely diseased beyond the graft touchdown site which was without discrete high-grade stenosis, there was retrograde filled to the level of the body of the circumflex there after intragraft nitroglycerin there was some improvement in caliber of the graft to the OM, subclavian artery graft was performed and inability to pass the catheter beyond the proximal left subclavian, adequate opacification did demonstrate a string sign and LIMA bypass graft, EF 50 to 55%, LVEDP 20-22  E.  Stress test 4/23/2021-small inferoapical and a large inferolateral wall ischemic defect, post-rest EF 59%, borderline transischemic dilation ratio of 1.18  F.  Left heart cath 4/30/2021-RCA 30% plaque mid vessel and distal 60% plaque the PDA is 100% occluded; SVG to obtuse patent, SVG to PDA widely patent with  good collateral flow to LAD, LIMA to LAD nonfunctional  2) Hypertension  2.1) Echo 10/4/16 - mild LVH; EF 60-65%; DD II; early MAC, mild MR, TR and VT; PA 20-25   2.2) echo 10/28/19-borderline LVH, diastolic dysfunction 2, trivial to mild MR, mild TR, PA in the 40s, EF 66 to 70%  3) Dyslipidemia  4) Obesity BMI 37  5) Cerebrovascular disease   a. Hx of TIA with workup in 2008 which was negative  6) Peripheral Vascular disease    a. SHABBIR - < 20% MARIAN; < 50% LICA   b. SHABBIR 10/4/16 - 16-49% MARIAN and LICA and bifurcation; antegrade flow   C.  Carotid artery ultrasound 5/4/2020 % stenosis of the distal right internal artery and mid left internal carotid artery, antegrade flow both vertebral arteries  7) Osteoarthritis  8) GERD  9) Raynaud's disease.  10) Anxiety/Depression  11) Event Monitor 7/7-7/20/15 - SB - NSR with PVCs; 4/29-5/12/2021-normal sinus rhythm      12)  AAA w/repair 1997        A. Abdominal US 7/24/14 - 2.4 CM        B. Abdominal ultrasound 10/20/17-previous aneurysm repair, graft is patent, mid aorta measures up to 2.4 cm        C.  Abdominal ultrasound 5/7/19-no evidence of persistent abdominal aortic aneurysm with maximal AP diameter of 2 cm, no evidence of significant aortic stenosis, iliac arteries are patent (Repeat every 2 yrs)        D.  Abdominal aorta ultrasound 5/4/2021-appears to be no residual aneurysmal dilation of the aorta with maximum dimension of 2.4 cm, no large perivascular clear space to suggest endoleak or rupture, repeat in 2 years      13) postprandial hypotension     14) Pfizer vaccination 2/13/2021; 3/13/2021      15)  Emphysema, Dr. Moreno        16) PVD       16.1) BLE arterial duplex at [facility] hosp. 5-28-15 mild atherosclerotic change in BLE, no sign. plaque or stenoses       16.2) bilateral lower extremity arterial ultrasound 5/4/2021-50 to 75% stenosis proximal right common femoral artery, 50% greater stenosis by Doppler sampling, 50 to 99% stenosis in the distal left  superficial femoral artery extending into the left popliteal artery potentially hemodynamic significant stenosis in the right common femoral artery and distal left superficial femoral artery      16.3)  CTA of abd with run off 5/28/2021 -greater than 80% stenosis in the renal arteries bilaterally, less than 30% stenosis celiac artery, 50% stenosis of the inferior mesenteric artery, occluded inferior mesenteric artery, 50% stenosis of the internal iliac arteries bilaterally, 70% stenosis common femoral artery on the right, less than 30% stenosis deep femoral artery, 30% stenosis of the superficial femoral artery, greater than 90% stenosis of the popliteal, left has less than 30% stenosis the common femoral, deep femoral artery, 30% of the superficial femoral artery, 50% of the popliteal artery, infarct in the lower pole of the left kidney, moderate hiatal hernia        17.)  Renal artery stenosis by CT 5/28/2021        18.)  Moderate hiatal hernia by CT 5/28/2021    Patient is an 82-year-old female who presents today for follow-up with her  at her side.  Patient says she has been having midsternal chest tightness whenever she is active.  She says she only has to do very little distance and she will get very short of breath with tightness in her chest.  She says it does not radiate and she does not take any nitroglycerin.  She says typically if she stops and rest it resolved.  She says it has gradually been getting worse over the past few months.  She gets dizzy, lightheaded and will have palpitations as well.  She is on oxygen 24/7 now.  She denies any presyncope, syncope, orthopnea or PND.  She does get some swelling in her legs and her  says she has been taking the Lasix daily.  She does have shortness of breath again with very very minimal activity.  She is on oxygen 24/7.  She stays fatigued as well.  They are getting ready to leave for 3 months therefore we will get a stress test scheduled for her  at the hospital soon as possible.   says her blood pressure does run better at home.    We went over her labs.  Her last LDL was 56, triglycerides 117, A1c 5.7, K4.3 and creatinine was 1.5.    Current Outpatient Medications on File Prior to Visit   Medication Sig Dispense Refill   • amLODIPine (NORVASC) 2.5 MG tablet Take 2.5 mg by mouth Every Night.     • aspirin 81 MG tablet Take 81 mg by mouth Every Night.     • clopidogrel (PLAVIX) 75 MG tablet Take 75 mg by mouth Every Night.     • Docusate Calcium (STOOL SOFTENER PO) Take 1 tablet by mouth Daily As Needed.     • Ferrous Gluconate (IRON 27 PO) Take 65 mg by mouth 4 (Four) Times a Week.     • furosemide (LASIX) 20 MG tablet Take 1 tablet by mouth Daily As Needed (edema). 90 tablet 3   • isosorbide mononitrate (IMDUR) 30 MG 24 hr tablet Take 1 tablet by mouth Daily. (Patient taking differently: Take 1 tablet by mouth Every Night.) 90 tablet 3   • LINZESS 145 MCG capsule capsule Take 145 mcg by mouth As Needed.     • memantine (NAMENDA XR) 7 MG capsule sustained-release 24 hr extended release capsule Take 28 mg by mouth Every Morning.     • nitroglycerin (NITROSTAT) 0.4 MG SL tablet Place 0.4 mg under the tongue Every 5 (Five) Minutes As Needed. Place 1 tablet under the tongue every 5 minutes for up to doses as needed for chest pain . Call 911 if pain persists     • Omega-3 Fatty Acids (FISH OIL) 1200 MG capsule capsule Take 1,200 mg by mouth 2 (Two) Times a Day With Meals. With 360 mg omega 3 bid     • ranolazine (RANEXA) 500 MG 12 hr tablet Take 1 tablet by mouth every 12 (twelve) hours.     • rosuvastatin (CRESTOR) 20 MG tablet Take 20 mg by mouth Every Night.     • sertraline (ZOLOFT) 50 MG tablet Take 50 mg by mouth Every Night.     • [DISCONTINUED] carvedilol (COREG) 6.25 MG tablet Take one table by mouth in AM if SBP > 130 and take one tablet by mouth in PM if SBP > 130 (Patient taking differently: Take 1 tablet by mouth Every Night.) 60 tablet 5   •  [DISCONTINUED] Flovent  MCG/ACT inhaler Inhale 2 puffs 2 (Two) Times a Day.       No current facility-administered medications on file prior to visit.       ALLERGIES    Morphine and Gabapentin    Past Medical History:   Diagnosis Date   • Aneurysm (HCC)     abdominal aortic aneurysm    • Aortic aneurysm (HCC)    • Arthritis    • Asthma    • Bilateral foot pain    • CAD, multiple vessel    • Carotid bruit    • Chest pain    • Claudication ([facility])    • COPD (chronic obstructive pulmonary disease) ([facility])    • COVID-19 vaccine administered 2021 - Pfzier    • COVID-19 vaccine administered 2021 - Pzfier  ( booster 10/2021 - pfizer    • Deviated septum    • Diminished pulses in lower extremity    • Edema    • Former smoker    • GERD (gastroesophageal reflux disease)    • History of transfusion    • Hyperlipidemia    • Hypertension    • Neuropathy    • Palpitations    • Pulmonary hypertension ([facility])    • Skin cancer     nose   • Stroke ([facility]) 2006    TIAs many years ago, pt denies residual effects   • TIA (transient ischemic attack)    • Tinnitus    • UTI (urinary tract infection)    • Vision impairment        Social History     Socioeconomic History   • Marital status:    • Number of children: 3   Tobacco Use   • Smoking status: Former     Packs/day: 0.50     Years: 30.00     Pack years: 15.00     Types: Cigarettes     Quit date:      Years since quittin.8   • Smokeless tobacco: Never   Vaping Use   • Vaping Use: Never used   Substance and Sexual Activity   • Alcohol use: Yes     Alcohol/week: 7.0 standard drinks     Types: 7 Glasses of wine per week   • Drug use: No   • Sexual activity: Defer       Family History   Problem Relation Age of Onset   • Other Mother         acute myocardial infarction   • Aneurysm Mother    • Goiter Mother    • Aneurysm Father    • Sudden death Father    • Heart failure Sister    • Other Other         leukemia   • Cancer Other          thyroid       Review of Systems   Constitutional: Positive for appetite change (slight decreased appetite ) and fatigue (sever fatigued ). Negative for chills and fever.   HENT: Positive for congestion (head , chest congestion ). Negative for rhinorrhea and sore throat.    Eyes: Positive for visual disturbance (glasses).   Respiratory: Positive for chest tightness (center of the chest ( pressure ) after she walks or moves around ) and shortness of breath (walking or with any type of movement;  uses O2 24/7; with CP ). Negative for cough and wheezing.    Cardiovascular: Positive for chest pain (tightness mid when she is active, gets very short of breath with, only short distance; no rad; no nitro; been this way but gradually getting worse ), palpitations (flips and flops ( pounds ) when she walks any distance uses O2 now, very short of breath ) and leg swelling (legs , feet and ankles swelling will go down has long has she keep her feet up and takes her lasix ).   Gastrointestinal: Positive for abdominal pain (right lower side in the pm and left side in the am ) and constipation (constipation at times ). Negative for blood in stool, diarrhea, nausea and vomiting.   Endocrine: Negative for cold intolerance and heat intolerance.   Genitourinary: Positive for frequency (severl times a day and night due to taking water pills ). Negative for difficulty urinating, dysuria, hematuria and urgency.   Musculoskeletal: Positive for arthralgias (throuhgout the body ), back pain (lower back ), gait problem (uses a cane ) and joint swelling (ankles ). Negative for neck pain and neck stiffness.   Skin: Negative for color change, pallor, rash and wound.   Allergic/Immunologic: Positive for environmental allergies (seasonal allergies ). Negative for food allergies.   Neurological: Positive for dizziness (walking ), weakness (weakness all over the body , she will use a walker at home to help her get around with balance ),  "light-headedness (walking ) and numbness (legs ( feel like there on fire in the am ) feet ). Negative for syncope and headaches.   Hematological: Bruises/bleeds easily (Brusies and bleeds easy ).   Psychiatric/Behavioral: Positive for sleep disturbance (oxygen day and night ( 2lts ) ).       Objective   /77 (BP Location: Right arm, Patient Position: Sitting)   Pulse 71   Temp 96.6 °F (35.9 °C)   Ht 167.6 cm (66\")   Wt 92.1 kg (203 lb)   SpO2 97%   BMI 32.77 kg/m²   Vitals:    10/26/22 1312 10/26/22 1335   BP: 177/90 150/77   BP Location: Left arm Right arm   Patient Position: Sitting Sitting   Pulse: 71    Temp: 96.6 °F (35.9 °C)    SpO2: 97%    Weight: 92.1 kg (203 lb)    Height: 167.6 cm (66\")       Lab Results (most recent)     None        Physical Exam  Vitals reviewed.   Constitutional:       General: She is awake.      Appearance: Normal appearance. She is well-developed and well-groomed. She is obese.   HENT:      Head: Normocephalic.      Right Ear: Decreased hearing noted.      Left Ear: Decreased hearing noted.   Eyes:      General: Lids are normal.      Comments: Wears glasses    Neck:      Vascular: No carotid bruit, hepatojugular reflux or JVD.   Cardiovascular:      Rate and Rhythm: Normal rate and regular rhythm.      Pulses:           Radial pulses are 2+ on the right side and 2+ on the left side.        Dorsalis pedis pulses are 2+ on the right side and 2+ on the left side.        Posterior tibial pulses are 2+ on the right side and 2+ on the left side.      Heart sounds: Normal heart sounds.   Pulmonary:      Effort: Pulmonary effort is normal.      Breath sounds: Normal air entry. Examination of the right-lower field reveals decreased breath sounds. Examination of the left-lower field reveals decreased breath sounds. Decreased breath sounds present.   Abdominal:      General: Bowel sounds are normal.      Palpations: Abdomen is soft.   Musculoskeletal:      Right lower le+ Edema " present.      Left lower leg: Edema present.      Comments: Uses a cane    Skin:     General: Skin is warm and dry.   Neurological:      Mental Status: She is alert and oriented to person, place, and time.   Psychiatric:         Attention and Perception: Attention and perception normal.         Mood and Affect: Mood and affect normal.         Speech: Speech normal.         Behavior: Behavior normal. Behavior is cooperative.         Thought Content: Thought content normal.         Cognition and Memory: Cognition normal. Memory is impaired.         Judgment: Judgment normal.         Procedure     ECG 12 Lead    Date/Time: 10/26/2022 1:31 PM  Performed by: Rekha Russo APRN  Authorized by: Rekha Russo APRN   Comparison: compared with previous ECG from 10/5/2021  Comparison to previous ECG: Poor r wave progression, previously had LAD  Rhythm: sinus rhythm  Rate: normal  BPM: 67  QRS axis: normal  Other findings: non-specific ST-T wave changes, low voltage and poor R wave progression    Clinical impression: abnormal EKG                 Assessment & Plan      Diagnosis Plan   1. Other chest pain  ECG 12 Lead    Stress Test With Myocardial Perfusion One Day      2. CAD, multiple vessel  ECG 12 Lead    Stress Test With Myocardial Perfusion One Day      3. Primary hypertension  ECG 12 Lead    carvedilol (COREG) 6.25 MG tablet    Basic Metabolic Panel    Stress Test With Myocardial Perfusion One Day      4. Hyperlipidemia, unspecified hyperlipidemia type  Stress Test With Myocardial Perfusion One Day      5. Renal artery stenosis ([facility])        6. PAD (peripheral artery disease) ([facility])        7. Palpitations  ECG 12 Lead    Stress Test With Myocardial Perfusion One Day      8. Grade II diastolic dysfunction  BNP      9. Bilateral carotid artery stenosis        10. Shortness of breath  BNP    Stress Test With Myocardial Perfusion One Day      11. Peripheral edema            Return in about 3 months (around  1/26/2023).    Chest pain/CAD/hypertension/hyperlipidemia/palpitation/shortness of breath-patient will have a Lexiscan stress test at [facility] as she needs it done as soon as possible because they are leaving out of town for 3 months.  She was encouraged use nitro as needed for chest pain no resolution to go to the ER.  CAD-patient's on aspirin, beta, Plavix and statin.  Hypertension-patient's on amlodipine and carvedilol.  Per 's report blood pressure does run better at home.  Hyperlipidemia-patient's on Crestor.  Her last LDL was 56.  Renal artery stenosis-patient's on aspirin and statin.  PAD-patient's on aspirin and statin.  Palpitations-stable.  Diastolic dysfunction/peripheral edema-patient is on Lasix.  Carotid artery disease-patient's on aspirin and statin.  She will get a BMP and BNP today.  She will continue her medication regimen.  She will follow-up in 3 months or sooner if any changes or abnormalities with testing.       Marissa Carey  reports that she quit smoking about 25 years ago. Her smoking use included cigarettes. She has a 15.00 pack-year smoking history. She has never used smokeless tobacco.. Advance Care Planning   ACP discussion was declined by the patient. Patient has an advance directive (not in EMR), copy requested. Patient did not bring med list or medicine bottles to appointment, med list has been reviewed and updated based on patient's knowledge of their meds.        Electronically signed by:

## 2022-11-01 DIAGNOSIS — R07.2 PRECORDIAL PAIN: ICD-10-CM

## 2022-11-01 DIAGNOSIS — R94.39 ABNORMAL STRESS TEST: ICD-10-CM

## 2022-11-01 DIAGNOSIS — I25.119 CORONARY ARTERY DISEASE INVOLVING NATIVE CORONARY ARTERY OF NATIVE HEART WITH ANGINA PECTORIS: ICD-10-CM

## 2022-11-01 DIAGNOSIS — I10 PRIMARY HYPERTENSION: ICD-10-CM

## 2022-11-01 RX ORDER — CLOPIDOGREL BISULFATE 75 MG/1
75 TABLET ORAL NIGHTLY
Qty: 90 TABLET | Refills: 3 | Status: SHIPPED | OUTPATIENT
Start: 2022-11-01

## 2022-11-01 RX ORDER — RANOLAZINE 500 MG/1
500 TABLET, EXTENDED RELEASE ORAL EVERY 12 HOURS
Qty: 180 TABLET | Refills: 3 | Status: SHIPPED | OUTPATIENT
Start: 2022-11-01

## 2022-11-01 RX ORDER — AMLODIPINE BESYLATE 2.5 MG/1
2.5 TABLET ORAL NIGHTLY
Qty: 90 TABLET | Refills: 3 | Status: SHIPPED | OUTPATIENT
Start: 2022-11-01

## 2022-11-01 RX ORDER — CARVEDILOL 6.25 MG/1
6.25 TABLET ORAL NIGHTLY
Qty: 90 TABLET | Refills: 3 | Status: SHIPPED | OUTPATIENT
Start: 2022-11-01

## 2022-11-01 RX ORDER — ISOSORBIDE MONONITRATE 30 MG/1
30 TABLET, EXTENDED RELEASE ORAL NIGHTLY
Qty: 90 TABLET | Refills: 3 | Status: SHIPPED | OUTPATIENT
Start: 2022-11-01

## 2022-11-03 ENCOUNTER — TELEPHONE (OUTPATIENT)
Dept: CARDIOLOGY | Facility: CLINIC | Age: 82
End: 2022-11-03

## 2022-11-03 NOTE — TELEPHONE ENCOUNTER
Patient's  came by to find out the results of her stress test she had on Friday.  It was scanned into the chart however I do not believe it was indexed to me.  I did give him the results that it showed no signs of ischemia and the EF was 72%.  He appreciated same.

## 2023-01-05 ENCOUNTER — TELEPHONE (OUTPATIENT)
Dept: CARDIOLOGY | Facility: CLINIC | Age: 83
End: 2023-01-05
Payer: MEDICARE

## 2023-01-05 NOTE — TELEPHONE ENCOUNTER
Aida called she is with patient in [facility].  They are there during the winter months.  Patient recently in the hospital for peritonitis, UTI and COPD exacerbation.  Some of her cardiac medicines were discontinued and they were trying to figure out if she needs to resume these.  It was her Coreg and her Plavix.  They agreed to go and resume the Plavix because she does have an occluded artery and she has been taking that with her aspirin.  I am requesting records to make sure there was not a specific reason why her Coreg was discontinued.  They said her blood pressure and heart rate have been running good.  I advised we will contact them as soon as we have the records to let them know if she needs to go back on her Coreg or not.    Has been said to call his cell phone number which is in the chart.

## 2023-01-10 ENCOUNTER — TELEPHONE (OUTPATIENT)
Dept: CARDIOLOGY | Facility: CLINIC | Age: 83
End: 2023-01-10
Payer: MEDICARE

## 2023-01-10 NOTE — TELEPHONE ENCOUNTER
----- Message from DENIS Orellana sent at 1/10/2023 10:14 AM EST -----  Please call 's cell and let him know we have not received records yet.  Since BP/HR doing ok he can start her back on coreg 3.125 mg BID (so 1/2 of the dose she was doing before for now).     Called  and advised of the above mess he states that pt is back in the Hospital now . I advised him that I would speak with Provider and let her know that she was back in the Hospital . And if provider wants to decrease the medication or wait and see what the Hospital says .    BLESSING Fischer    Per DENIS Sanchez     Once pt's  returns call he will let us know the medication that the Hospital prescribed's to pt , we will let Rekha know what medications that she is taking and she will then decide then on the coreg dose     BLESSING Fischer

## 2023-01-23 ENCOUNTER — TELEPHONE (OUTPATIENT)
Dept: CARDIOLOGY | Facility: CLINIC | Age: 83
End: 2023-01-23

## 2023-01-23 NOTE — TELEPHONE ENCOUNTER
“Please be informed that patient has passed. Patient has been marked  in the system.     Caller: Venu Carey    Relationship: Emergency Contact    Best call back number: [phone]

## (undated) DEVICE — ANGIOGRAPHIC CATHETER: Brand: EXPO™

## (undated) DEVICE — PK CATH CARD 10

## (undated) DEVICE — GLIDEX™ COATED HYDROPHILIC GUIDEWIRE: Brand: MAGIC TORQUE™

## (undated) DEVICE — MODEL BT2000 P/N 700287-012KIT CONTENTS: MANIFOLD WITH SALINE AND CONTRAST PORTS, SALINE TUBING WITH SPIKE AND HAND SYRINGE, TRANSDUCER: Brand: BT2000 AUTOMATED MANIFOLD KIT

## (undated) DEVICE — GW INQWIRE FC PTFE STD J/1.5 .035 260

## (undated) DEVICE — NDL PERC 1PRT THNWALL W/BASEPLT 18G 7CM

## (undated) DEVICE — CATH DIAG EXPO M/ PK 5F FL4/FR4 PIG

## (undated) DEVICE — RADIFOCUS GLIDEWIRE: Brand: GLIDEWIRE

## (undated) DEVICE — TBG INJ CONTRL EXCITE RA 1200PSI 48IN

## (undated) DEVICE — CATH GUIDE SOFTVU FLUSH HT PIG .035 4F 65CM

## (undated) DEVICE — AVANTI + 4F STD W/GW: Brand: AVANTI

## (undated) DEVICE — CATH DIAG EXPO .045 FL3  5F 100CM

## (undated) DEVICE — GLV SURG BIOGEL LTX PF 8

## (undated) DEVICE — DEV COMP RAD PRELUDESYNC 24CM

## (undated) DEVICE — MODEL AT P65, P/N 701554-001KIT CONTENTS: HAND CONTROLLER, 3-WAY HIGH-PRESSURE STOPCOCK WITH ROTATING END AND PREMIUM HIGH-PRESSURE TUBING: Brand: ANGIOTOUCH® KIT

## (undated) DEVICE — PK ANGIO OR 10

## (undated) DEVICE — GLV SURG BIOGEL LTX PF 7 1/2

## (undated) DEVICE — INTRO SHEATH PRELUDE IDEAL SPRNG COIL 021 6F 23X80CM